# Patient Record
Sex: MALE | Race: WHITE | NOT HISPANIC OR LATINO | Employment: OTHER | URBAN - NONMETROPOLITAN AREA
[De-identification: names, ages, dates, MRNs, and addresses within clinical notes are randomized per-mention and may not be internally consistent; named-entity substitution may affect disease eponyms.]

---

## 2018-10-06 ENCOUNTER — HOSPITAL ENCOUNTER (INPATIENT)
Facility: HOSPITAL | Age: 58
LOS: 1 days | Discharge: HOME | DRG: 637 | End: 2018-10-07
Attending: PHYSICIAN ASSISTANT | Admitting: INTERNAL MEDICINE
Payer: MEDICARE

## 2018-10-06 ENCOUNTER — APPOINTMENT (OUTPATIENT)
Dept: GENERAL RADIOLOGY | Facility: HOSPITAL | Age: 58
DRG: 637 | End: 2018-10-06
Attending: PHYSICIAN ASSISTANT
Payer: MEDICARE

## 2018-10-06 DIAGNOSIS — E10.65 TYPE 1 DIABETES MELLITUS WITH HYPERGLYCEMIA (H): ICD-10-CM

## 2018-10-06 DIAGNOSIS — E86.0 DEHYDRATION: ICD-10-CM

## 2018-10-06 DIAGNOSIS — N17.9 ACUTE RENAL FAILURE, UNSPECIFIED ACUTE RENAL FAILURE TYPE (H): ICD-10-CM

## 2018-10-06 PROBLEM — R55 SYNCOPE, UNSPECIFIED SYNCOPE TYPE: Status: ACTIVE | Noted: 2018-10-06

## 2018-10-06 PROBLEM — M54.9 BACK PAIN: Status: ACTIVE | Noted: 2018-10-06

## 2018-10-06 PROBLEM — E11.00 UNCONTROLLED TYPE 2 DIABETES MELLITUS WITH HYPEROSMOLAR NONKETOTIC HYPERGLYCEMIA (H): Status: ACTIVE | Noted: 2018-10-06

## 2018-10-06 LAB
ALBUMIN SERPL-MCNC: 3.6 G/DL (ref 3.4–5)
ALBUMIN SERPL-MCNC: 3.6 G/DL (ref 3.4–5)
ALBUMIN SERPL-MCNC: 4 G/DL (ref 3.4–5)
ALBUMIN UR-MCNC: NEGATIVE MG/DL
ALP SERPL-CCNC: 102 U/L (ref 40–150)
ALP SERPL-CCNC: 103 U/L (ref 40–150)
ALP SERPL-CCNC: 118 U/L (ref 40–150)
ALT SERPL W P-5'-P-CCNC: 43 U/L (ref 0–70)
ALT SERPL W P-5'-P-CCNC: 45 U/L (ref 0–70)
ALT SERPL W P-5'-P-CCNC: 50 U/L (ref 0–70)
ANION GAP SERPL CALCULATED.3IONS-SCNC: 13 MMOL/L (ref 3–14)
ANION GAP SERPL CALCULATED.3IONS-SCNC: 6 MMOL/L (ref 3–14)
ANION GAP SERPL CALCULATED.3IONS-SCNC: 7 MMOL/L (ref 3–14)
APPEARANCE UR: CLEAR
AST SERPL W P-5'-P-CCNC: 17 U/L (ref 0–45)
AST SERPL W P-5'-P-CCNC: 17 U/L (ref 0–45)
AST SERPL W P-5'-P-CCNC: 23 U/L (ref 0–45)
BASE DEFICIT BLDV-SCNC: 1 MMOL/L
BASOPHILS # BLD AUTO: 0 10E9/L (ref 0–0.2)
BASOPHILS NFR BLD AUTO: 0.5 %
BILIRUB SERPL-MCNC: 0.5 MG/DL (ref 0.2–1.3)
BILIRUB SERPL-MCNC: 0.7 MG/DL (ref 0.2–1.3)
BILIRUB SERPL-MCNC: 0.9 MG/DL (ref 0.2–1.3)
BILIRUB UR QL STRIP: NEGATIVE
BUN SERPL-MCNC: 40 MG/DL (ref 7–30)
BUN SERPL-MCNC: 42 MG/DL (ref 7–30)
BUN SERPL-MCNC: 43 MG/DL (ref 7–30)
CALCIUM SERPL-MCNC: 8 MG/DL (ref 8.5–10.1)
CALCIUM SERPL-MCNC: 8.2 MG/DL (ref 8.5–10.1)
CALCIUM SERPL-MCNC: 8.9 MG/DL (ref 8.5–10.1)
CHLORIDE SERPL-SCNC: 105 MMOL/L (ref 94–109)
CHLORIDE SERPL-SCNC: 91 MMOL/L (ref 94–109)
CHLORIDE SERPL-SCNC: 98 MMOL/L (ref 94–109)
CK SERPL-CCNC: 172 U/L (ref 30–300)
CO2 SERPL-SCNC: 23 MMOL/L (ref 20–32)
CO2 SERPL-SCNC: 25 MMOL/L (ref 20–32)
CO2 SERPL-SCNC: 26 MMOL/L (ref 20–32)
COLOR UR AUTO: ABNORMAL
CREAT SERPL-MCNC: 1.65 MG/DL (ref 0.66–1.25)
CREAT SERPL-MCNC: 1.84 MG/DL (ref 0.66–1.25)
CREAT SERPL-MCNC: 1.97 MG/DL (ref 0.66–1.25)
CRP SERPL-MCNC: <2.9 MG/L (ref 0–8)
D DIMER PPP DDU-MCNC: 206 NG/ML D-DU (ref 0–300)
DIFFERENTIAL METHOD BLD: NORMAL
EOSINOPHIL # BLD AUTO: 0.1 10E9/L (ref 0–0.7)
EOSINOPHIL NFR BLD AUTO: 1.5 %
ERYTHROCYTE [DISTWIDTH] IN BLOOD BY AUTOMATED COUNT: 11.4 % (ref 10–15)
ERYTHROCYTE [SEDIMENTATION RATE] IN BLOOD BY WESTERGREN METHOD: 7 MM/H (ref 0–20)
EST. AVERAGE GLUCOSE BLD GHB EST-MCNC: 280 MG/DL
GFR SERPL CREATININE-BSD FRML MDRD: 35 ML/MIN/1.7M2
GFR SERPL CREATININE-BSD FRML MDRD: 38 ML/MIN/1.7M2
GFR SERPL CREATININE-BSD FRML MDRD: 43 ML/MIN/1.7M2
GLUCOSE SERPL-MCNC: 294 MG/DL (ref 70–99)
GLUCOSE SERPL-MCNC: 607 MG/DL (ref 70–99)
GLUCOSE SERPL-MCNC: 774 MG/DL (ref 70–99)
GLUCOSE UR STRIP-MCNC: >1000 MG/DL
HBA1C MFR BLD: 11.4 % (ref 0–5.6)
HCO3 BLDV-SCNC: 26 MMOL/L (ref 21–28)
HCT VFR BLD AUTO: 43.2 % (ref 40–53)
HGB BLD-MCNC: 15.1 G/DL (ref 13.3–17.7)
HGB UR QL STRIP: NEGATIVE
IMM GRANULOCYTES # BLD: 0 10E9/L (ref 0–0.4)
IMM GRANULOCYTES NFR BLD: 0.1 %
KETONES BLD-SCNC: 2.9 MMOL/L (ref 0–0.6)
KETONES UR STRIP-MCNC: 10 MG/DL
LACTATE SERPL-SCNC: 2.3 MMOL/L (ref 0.4–2)
LEUKOCYTE ESTERASE UR QL STRIP: NEGATIVE
LYMPHOCYTES # BLD AUTO: 0.8 10E9/L (ref 0.8–5.3)
LYMPHOCYTES NFR BLD AUTO: 11 %
MCH RBC QN AUTO: 32.3 PG (ref 26.5–33)
MCHC RBC AUTO-ENTMCNC: 35 G/DL (ref 31.5–36.5)
MCV RBC AUTO: 93 FL (ref 78–100)
MONOCYTES # BLD AUTO: 0.6 10E9/L (ref 0–1.3)
MONOCYTES NFR BLD AUTO: 7.9 %
NEUTROPHILS # BLD AUTO: 5.8 10E9/L (ref 1.6–8.3)
NEUTROPHILS NFR BLD AUTO: 79 %
NITRATE UR QL: NEGATIVE
NRBC # BLD AUTO: 0 10*3/UL
NRBC BLD AUTO-RTO: 0 /100
O2/TOTAL GAS SETTING VFR VENT: ABNORMAL %
OSMOLALITY SERPL: 319 MMOL/KG (ref 280–295)
OXYHGB MFR BLDV: 45 %
PCO2 BLDV: 53 MM HG (ref 40–50)
PH BLDV: 7.31 PH (ref 7.32–7.43)
PH UR STRIP: 5 PH (ref 4.7–8)
PLATELET # BLD AUTO: 199 10E9/L (ref 150–450)
PO2 BLDV: 29 MM HG (ref 25–47)
POTASSIUM SERPL-SCNC: 4.7 MMOL/L (ref 3.4–5.3)
POTASSIUM SERPL-SCNC: 5.7 MMOL/L (ref 3.4–5.3)
POTASSIUM SERPL-SCNC: 5.9 MMOL/L (ref 3.4–5.3)
PROCALCITONIN SERPL-MCNC: <0.05 NG/ML
PROT SERPL-MCNC: 6.4 G/DL (ref 6.8–8.8)
PROT SERPL-MCNC: 6.5 G/DL (ref 6.8–8.8)
PROT SERPL-MCNC: 7.2 G/DL (ref 6.8–8.8)
RBC # BLD AUTO: 4.67 10E12/L (ref 4.4–5.9)
SODIUM SERPL-SCNC: 127 MMOL/L (ref 133–144)
SODIUM SERPL-SCNC: 131 MMOL/L (ref 133–144)
SODIUM SERPL-SCNC: 136 MMOL/L (ref 133–144)
SOURCE: ABNORMAL
SP GR UR STRIP: 1.01 (ref 1–1.03)
TROPONIN I SERPL-MCNC: <0.015 UG/L (ref 0–0.04)
UROBILINOGEN UR STRIP-MCNC: NORMAL MG/DL (ref 0–2)
WBC # BLD AUTO: 7.4 10E9/L (ref 4–11)

## 2018-10-06 PROCEDURE — 86140 C-REACTIVE PROTEIN: CPT | Performed by: PHYSICIAN ASSISTANT

## 2018-10-06 PROCEDURE — 36415 COLL VENOUS BLD VENIPUNCTURE: CPT | Performed by: PHYSICIAN ASSISTANT

## 2018-10-06 PROCEDURE — 84484 ASSAY OF TROPONIN QUANT: CPT | Performed by: PHYSICIAN ASSISTANT

## 2018-10-06 PROCEDURE — 99285 EMERGENCY DEPT VISIT HI MDM: CPT | Mod: 25

## 2018-10-06 PROCEDURE — 84145 PROCALCITONIN (PCT): CPT | Performed by: PHYSICIAN ASSISTANT

## 2018-10-06 PROCEDURE — 82010 KETONE BODYS QUAN: CPT | Performed by: PHYSICIAN ASSISTANT

## 2018-10-06 PROCEDURE — 81003 URINALYSIS AUTO W/O SCOPE: CPT | Performed by: PHYSICIAN ASSISTANT

## 2018-10-06 PROCEDURE — 85652 RBC SED RATE AUTOMATED: CPT | Performed by: INTERNAL MEDICINE

## 2018-10-06 PROCEDURE — 25000128 H RX IP 250 OP 636: Performed by: INTERNAL MEDICINE

## 2018-10-06 PROCEDURE — 83930 ASSAY OF BLOOD OSMOLALITY: CPT | Performed by: PHYSICIAN ASSISTANT

## 2018-10-06 PROCEDURE — 25000131 ZZH RX MED GY IP 250 OP 636 PS 637: Mod: GY | Performed by: PHYSICIAN ASSISTANT

## 2018-10-06 PROCEDURE — 85025 COMPLETE CBC W/AUTO DIFF WBC: CPT | Performed by: PHYSICIAN ASSISTANT

## 2018-10-06 PROCEDURE — 83036 HEMOGLOBIN GLYCOSYLATED A1C: CPT | Performed by: PHYSICIAN ASSISTANT

## 2018-10-06 PROCEDURE — 12000000 ZZH R&B MED SURG/OB

## 2018-10-06 PROCEDURE — 40000788 ZZHCL STATISTIC ESTIMATED AVERAGE GLUCOSE: Performed by: PHYSICIAN ASSISTANT

## 2018-10-06 PROCEDURE — 71045 X-RAY EXAM CHEST 1 VIEW: CPT | Mod: TC

## 2018-10-06 PROCEDURE — 85379 FIBRIN DEGRADATION QUANT: CPT | Performed by: PHYSICIAN ASSISTANT

## 2018-10-06 PROCEDURE — 96376 TX/PRO/DX INJ SAME DRUG ADON: CPT

## 2018-10-06 PROCEDURE — 96374 THER/PROPH/DIAG INJ IV PUSH: CPT

## 2018-10-06 PROCEDURE — 82805 BLOOD GASES W/O2 SATURATION: CPT | Performed by: PHYSICIAN ASSISTANT

## 2018-10-06 PROCEDURE — 25000128 H RX IP 250 OP 636: Performed by: PHYSICIAN ASSISTANT

## 2018-10-06 PROCEDURE — 99223 1ST HOSP IP/OBS HIGH 75: CPT | Mod: AI | Performed by: INTERNAL MEDICINE

## 2018-10-06 PROCEDURE — 00000146 ZZHCL STATISTIC GLUCOSE BY METER IP

## 2018-10-06 PROCEDURE — 87040 BLOOD CULTURE FOR BACTERIA: CPT | Performed by: PHYSICIAN ASSISTANT

## 2018-10-06 PROCEDURE — 80053 COMPREHEN METABOLIC PANEL: CPT | Performed by: PHYSICIAN ASSISTANT

## 2018-10-06 PROCEDURE — 83605 ASSAY OF LACTIC ACID: CPT | Performed by: PHYSICIAN ASSISTANT

## 2018-10-06 PROCEDURE — 96361 HYDRATE IV INFUSION ADD-ON: CPT

## 2018-10-06 PROCEDURE — 25000132 ZZH RX MED GY IP 250 OP 250 PS 637: Mod: GY | Performed by: PHYSICIAN ASSISTANT

## 2018-10-06 PROCEDURE — 93005 ELECTROCARDIOGRAM TRACING: CPT

## 2018-10-06 PROCEDURE — 99285 EMERGENCY DEPT VISIT HI MDM: CPT | Mod: Z6 | Performed by: PHYSICIAN ASSISTANT

## 2018-10-06 PROCEDURE — 93010 ELECTROCARDIOGRAM REPORT: CPT | Performed by: INTERNAL MEDICINE

## 2018-10-06 PROCEDURE — 82550 ASSAY OF CK (CPK): CPT | Performed by: INTERNAL MEDICINE

## 2018-10-06 RX ORDER — ONDANSETRON 4 MG/1
4 TABLET, ORALLY DISINTEGRATING ORAL EVERY 6 HOURS PRN
Status: DISCONTINUED | OUTPATIENT
Start: 2018-10-06 | End: 2018-10-07 | Stop reason: HOSPADM

## 2018-10-06 RX ORDER — MAGNESIUM SULFATE HEPTAHYDRATE 40 MG/ML
4 INJECTION, SOLUTION INTRAVENOUS EVERY 4 HOURS PRN
Status: DISCONTINUED | OUTPATIENT
Start: 2018-10-06 | End: 2018-10-07 | Stop reason: HOSPADM

## 2018-10-06 RX ORDER — NALOXONE HYDROCHLORIDE 0.4 MG/ML
.1-.4 INJECTION, SOLUTION INTRAMUSCULAR; INTRAVENOUS; SUBCUTANEOUS
Status: DISCONTINUED | OUTPATIENT
Start: 2018-10-06 | End: 2018-10-07 | Stop reason: HOSPADM

## 2018-10-06 RX ORDER — CHOLECALCIFEROL (VITAMIN D3) 25 MCG
2 CAPSULE ORAL DAILY
Status: ON HOLD | COMMUNITY
End: 2018-11-23

## 2018-10-06 RX ORDER — AMPICILLIN TRIHYDRATE 250 MG
1 CAPSULE ORAL DAILY
Status: ON HOLD | COMMUNITY
End: 2018-11-23

## 2018-10-06 RX ORDER — POTASSIUM CHLORIDE 7.45 MG/ML
10 INJECTION INTRAVENOUS
Status: DISCONTINUED | OUTPATIENT
Start: 2018-10-06 | End: 2018-10-07 | Stop reason: HOSPADM

## 2018-10-06 RX ORDER — SODIUM CHLORIDE 9 MG/ML
INJECTION, SOLUTION INTRAVENOUS CONTINUOUS
Status: DISCONTINUED | OUTPATIENT
Start: 2018-10-06 | End: 2018-10-07

## 2018-10-06 RX ORDER — NICOTINE POLACRILEX 4 MG
15-30 LOZENGE BUCCAL
Status: DISCONTINUED | OUTPATIENT
Start: 2018-10-06 | End: 2018-10-07 | Stop reason: HOSPADM

## 2018-10-06 RX ORDER — POTASSIUM CL/LIDO/0.9 % NACL 10MEQ/0.1L
10 INTRAVENOUS SOLUTION, PIGGYBACK (ML) INTRAVENOUS
Status: DISCONTINUED | OUTPATIENT
Start: 2018-10-06 | End: 2018-10-07 | Stop reason: HOSPADM

## 2018-10-06 RX ORDER — MULTIPLE VITAMINS W/ MINERALS TAB 9MG-400MCG
1 TAB ORAL DAILY
Status: ON HOLD | COMMUNITY
End: 2021-09-16

## 2018-10-06 RX ORDER — METOPROLOL TARTRATE 50 MG
50 TABLET ORAL 2 TIMES DAILY
Status: DISCONTINUED | OUTPATIENT
Start: 2018-10-06 | End: 2018-10-07 | Stop reason: HOSPADM

## 2018-10-06 RX ORDER — POTASSIUM CHLORIDE 1.5 G/1.58G
20-40 POWDER, FOR SOLUTION ORAL
Status: DISCONTINUED | OUTPATIENT
Start: 2018-10-06 | End: 2018-10-07 | Stop reason: HOSPADM

## 2018-10-06 RX ORDER — METOPROLOL TARTRATE 50 MG
50 TABLET ORAL ONCE
Status: COMPLETED | OUTPATIENT
Start: 2018-10-06 | End: 2018-10-06

## 2018-10-06 RX ORDER — POTASSIUM CHLORIDE 1500 MG/1
20-40 TABLET, EXTENDED RELEASE ORAL
Status: DISCONTINUED | OUTPATIENT
Start: 2018-10-06 | End: 2018-10-07 | Stop reason: HOSPADM

## 2018-10-06 RX ORDER — ASPIRIN 81 MG/1
81 TABLET, CHEWABLE ORAL DAILY
Status: DISCONTINUED | OUTPATIENT
Start: 2018-10-07 | End: 2018-10-07 | Stop reason: HOSPADM

## 2018-10-06 RX ORDER — ACETAMINOPHEN 325 MG/1
650 TABLET ORAL EVERY 4 HOURS PRN
Status: DISCONTINUED | OUTPATIENT
Start: 2018-10-06 | End: 2018-10-07 | Stop reason: HOSPADM

## 2018-10-06 RX ORDER — DEXTROSE MONOHYDRATE 25 G/50ML
25-50 INJECTION, SOLUTION INTRAVENOUS
Status: DISCONTINUED | OUTPATIENT
Start: 2018-10-06 | End: 2018-10-07 | Stop reason: HOSPADM

## 2018-10-06 RX ORDER — ONDANSETRON 2 MG/ML
4 INJECTION INTRAMUSCULAR; INTRAVENOUS EVERY 6 HOURS PRN
Status: DISCONTINUED | OUTPATIENT
Start: 2018-10-06 | End: 2018-10-07 | Stop reason: HOSPADM

## 2018-10-06 RX ORDER — LABETALOL HYDROCHLORIDE 5 MG/ML
20 INJECTION, SOLUTION INTRAVENOUS EVERY 6 HOURS PRN
Status: DISCONTINUED | OUTPATIENT
Start: 2018-10-06 | End: 2018-10-07 | Stop reason: HOSPADM

## 2018-10-06 RX ORDER — LEVOTHYROXINE SODIUM 50 UG/1
50 TABLET ORAL DAILY
Status: DISCONTINUED | OUTPATIENT
Start: 2018-10-07 | End: 2018-10-07 | Stop reason: HOSPADM

## 2018-10-06 RX ADMIN — SODIUM CHLORIDE 1000 ML: 9 INJECTION, SOLUTION INTRAVENOUS at 17:13

## 2018-10-06 RX ADMIN — INSULIN HUMAN 5 UNITS: 100 INJECTION, SOLUTION PARENTERAL at 20:28

## 2018-10-06 RX ADMIN — SODIUM CHLORIDE 1000 ML: 9 INJECTION, SOLUTION INTRAVENOUS at 17:57

## 2018-10-06 RX ADMIN — SODIUM CHLORIDE 1000 ML: 9 INJECTION, SOLUTION INTRAVENOUS at 20:07

## 2018-10-06 RX ADMIN — SODIUM CHLORIDE: 9 INJECTION, SOLUTION INTRAVENOUS at 23:18

## 2018-10-06 RX ADMIN — SODIUM CHLORIDE 1000 ML: 9 INJECTION, SOLUTION INTRAVENOUS at 23:18

## 2018-10-06 RX ADMIN — INSULIN HUMAN 5 UNITS: 100 INJECTION, SOLUTION PARENTERAL at 18:02

## 2018-10-06 RX ADMIN — METOPROLOL TARTRATE 50 MG: 50 TABLET, FILM COATED ORAL at 21:00

## 2018-10-06 NOTE — ED PROVIDER NOTES
History     Chief Complaint   Patient presents with     Dizziness     HPI  Homer Triana is a 58 year old male with type I diabetes and insulin pump who presents with fatigue, generalized weakness, and dizziness x 1 month. He has not been checking his blood sugars for quite some time as he had been pretty well controlled. His pump does not read his blood sugar levels. He had a syncopal episode a few weeks ago as well which occurred when he stood up from sitting. He has also been having pain between his shoulder blades and posterior neck with this as well. No aggravating or alleviating factors. Has a chronic cough. Denies dyspnea or chest pain. Denies dysuria or abdominal pain. No fevers/chills. Has a daily diarrhea stool since having his gallbladder removed 3 years ago, no changes. Denies black/bloody stools.     Problem List:    Patient Active Problem List    Diagnosis Date Noted     Uncontrolled type 2 DM with hyperosmolar nonketotic hyperglycemia (H) 10/06/2018     Priority: Medium     Syncope, unspecified syncope type 10/06/2018     Priority: Medium     Diabetic neuropathy (H) 10/06/2018     Priority: Medium     Back pain 10/06/2018     Priority: Medium     Generalized muscle weakness 10/06/2018     Priority: Medium     Essential hypertension 10/06/2018     Priority: Medium     Acute kidney injury (H) 10/06/2018     Priority: Medium        Past Medical History:    Past Medical History:   Diagnosis Date     Diabetes (H)      High cholesterol      Hypertension      Thyroid disease        Past Surgical History:    History reviewed. No pertinent surgical history.    Family History:    No family history on file.    Social History:  Marital Status:    Social History   Substance Use Topics     Smoking status: Former Smoker     Smokeless tobacco: Never Used     Alcohol use Yes      Comment: moderately        Medications:      Ascorbic Acid (VITAMIN C PO)   ASPIRIN PO   Cholecalciferol (VITAMIN D-3) 1000 units CAPS    cinnamon 500 MG CAPS   Flax Oil-Fish Oil-Borage Oil (FISH OIL-FLAX OIL-BORAGE OIL PO)   HYDROCHLOROTHIAZIDE PO   Insulin Aspart (NOVOLOG SC)   LEVOTHYROXINE SODIUM PO   LISINOPRIL PO   METOPROLOL TARTRATE PO   multivitamin, therapeutic with minerals (MULTI-VITAMIN) TABS tablet   ROSUVASTATIN CALCIUM PO         Review of Systems   All other systems reviewed and are negative.      Physical Exam   BP: (!) 187/95  Pulse: 105  Heart Rate: 98  Temp: 96  F (35.6  C)  Resp: 16  SpO2: 100 %      Physical Exam   Constitutional: He is oriented to person, place, and time. He appears well-developed and well-nourished. He appears ill. No distress.   Shaky when standing.    HENT:   Head: Normocephalic and atraumatic.   Right Ear: External ear normal.   Left Ear: External ear normal.   Nose: Nose normal.   Mouth/Throat: Oropharynx is clear and moist. No oropharyngeal exudate.   Eyes: Conjunctivae and EOM are normal. Pupils are equal, round, and reactive to light. Right eye exhibits no discharge. Left eye exhibits no discharge. No scleral icterus.   Neck: Normal range of motion. Neck supple. No JVD present.   Cardiovascular: Normal rate, regular rhythm, normal heart sounds and intact distal pulses.  Exam reveals no gallop and no friction rub.    No murmur heard.  Pulmonary/Chest: Effort normal and breath sounds normal. No respiratory distress. He has no wheezes. He has no rales. He exhibits no tenderness.   Abdominal: There is no tenderness.   Musculoskeletal: Normal range of motion. He exhibits no edema.   Lymphadenopathy:     He has no cervical adenopathy.   Neurological: He is alert and oriented to person, place, and time. No cranial nerve deficit. Coordination normal.   Skin: Skin is warm and dry. No rash noted. He is not diaphoretic. No erythema. No pallor.   Psychiatric: He has a normal mood and affect. His behavior is normal. Judgment and thought content normal.   Nursing note and vitals reviewed.      ED Course     ED  Course     Procedures          EKG: NSR without acute ST-T changes.     Results for orders placed or performed during the hospital encounter of 10/06/18 (from the past 24 hour(s))   CBC with platelets differential   Result Value Ref Range    WBC 7.4 4.0 - 11.0 10e9/L    RBC Count 4.67 4.4 - 5.9 10e12/L    Hemoglobin 15.1 13.3 - 17.7 g/dL    Hematocrit 43.2 40.0 - 53.0 %    MCV 93 78 - 100 fl    MCH 32.3 26.5 - 33.0 pg    MCHC 35.0 31.5 - 36.5 g/dL    RDW 11.4 10.0 - 15.0 %    Platelet Count 199 150 - 450 10e9/L    Diff Method Automated Method     % Neutrophils 79.0 %    % Lymphocytes 11.0 %    % Monocytes 7.9 %    % Eosinophils 1.5 %    % Basophils 0.5 %    % Immature Granulocytes 0.1 %    Nucleated RBCs 0 0 /100    Absolute Neutrophil 5.8 1.6 - 8.3 10e9/L    Absolute Lymphocytes 0.8 0.8 - 5.3 10e9/L    Absolute Monocytes 0.6 0.0 - 1.3 10e9/L    Absolute Eosinophils 0.1 0.0 - 0.7 10e9/L    Absolute Basophils 0.0 0.0 - 0.2 10e9/L    Abs Immature Granulocytes 0.0 0 - 0.4 10e9/L    Absolute Nucleated RBC 0.0    Comprehensive metabolic panel   Result Value Ref Range    Sodium 127 (L) 133 - 144 mmol/L    Potassium 5.9 (H) 3.4 - 5.3 mmol/L    Chloride 91 (L) 94 - 109 mmol/L    Carbon Dioxide 23 20 - 32 mmol/L    Anion Gap 13 3 - 14 mmol/L    Glucose 774 (HH) 70 - 99 mg/dL    Urea Nitrogen 43 (H) 7 - 30 mg/dL    Creatinine 1.97 (H) 0.66 - 1.25 mg/dL    GFR Estimate 35 (L) >60 mL/min/1.7m2    GFR Estimate If Black 43 (L) >60 mL/min/1.7m2    Calcium 8.9 8.5 - 10.1 mg/dL    Bilirubin Total 0.9 0.2 - 1.3 mg/dL    Albumin 4.0 3.4 - 5.0 g/dL    Protein Total 7.2 6.8 - 8.8 g/dL    Alkaline Phosphatase 118 40 - 150 U/L    ALT 50 0 - 70 U/L    AST 23 0 - 45 U/L   D-Dimer ([facility])   Result Value Ref Range    D-Dimer ng/mL 206 0 - 300 ng/ml D-DU   Lactic acid   Result Value Ref Range    Lactic Acid 2.3 (H) 0.4 - 2.0 mmol/L   Procalcitonin   Result Value Ref Range    Procalcitonin <0.05 ng/ml   Troponin I   Result Value Ref Range     Troponin I ES <0.015 0.000 - 0.045 ug/L   Hemoglobin A1c   Result Value Ref Range    Hemoglobin A1C 11.4 (H) 0 - 5.6 %   Estimated Average Glucose   Result Value Ref Range    Estimated Average Glucose 280 mg/dL   CRP inflammation   Result Value Ref Range    CRP Inflammation <2.9 0.0 - 8.0 mg/L   Ketone Beta-Hydroxybutyrate Quantitative   Result Value Ref Range    Ketone Quantitative 2.9 (H) 0.0 - 0.6 mmol/L   UA reflex to Microscopic and Culture   Result Value Ref Range    Color Urine Light Yellow     Appearance Urine Clear     Glucose Urine >1000 (A) NEG^Negative mg/dL    Bilirubin Urine Negative NEG^Negative    Ketones Urine 10 (A) NEG^Negative mg/dL    Specific Gravity Urine 1.014 1.003 - 1.035    Blood Urine Negative NEG^Negative    pH Urine 5.0 4.7 - 8.0 pH    Protein Albumin Urine Negative NEG^Negative mg/dL    Urobilinogen mg/dL Normal 0.0 - 2.0 mg/dL    Nitrite Urine Negative NEG^Negative    Leukocyte Esterase Urine Negative NEG^Negative    Source Midstream Urine    Comprehensive metabolic panel   Result Value Ref Range    Sodium 131 (L) 133 - 144 mmol/L    Potassium 5.7 (H) 3.4 - 5.3 mmol/L    Chloride 98 94 - 109 mmol/L    Carbon Dioxide 26 20 - 32 mmol/L    Anion Gap 7 3 - 14 mmol/L    Glucose 607 (HH) 70 - 99 mg/dL    Urea Nitrogen 42 (H) 7 - 30 mg/dL    Creatinine 1.84 (H) 0.66 - 1.25 mg/dL    GFR Estimate 38 (L) >60 mL/min/1.7m2    GFR Estimate If Black 46 (L) >60 mL/min/1.7m2    Calcium 8.0 (L) 8.5 - 10.1 mg/dL    Bilirubin Total 0.7 0.2 - 1.3 mg/dL    Albumin 3.6 3.4 - 5.0 g/dL    Protein Total 6.5 (L) 6.8 - 8.8 g/dL    Alkaline Phosphatase 102 40 - 150 U/L    ALT 45 0 - 70 U/L    AST 17 0 - 45 U/L   Osmolality   Result Value Ref Range    Osmolality 319 (H) 280 - 295 mmol/kg   Blood gas venous and oxyhgb   Result Value Ref Range    Ph Venous 7.31 (L) 7.32 - 7.43 pH    PCO2 Venous 53 (H) 40 - 50 mm Hg    PO2 Venous 29 25 - 47 mm Hg    Bicarbonate Venous 26 21 - 28 mmol/L    FIO2 21%     Oxyhemoglobin  Venous 45 %    Base Deficit Venous 1.0 mmol/L   Comprehensive metabolic panel   Result Value Ref Range    Sodium 136 133 - 144 mmol/L    Potassium 4.7 3.4 - 5.3 mmol/L    Chloride 105 94 - 109 mmol/L    Carbon Dioxide 25 20 - 32 mmol/L    Anion Gap 6 3 - 14 mmol/L    Glucose 294 (H) 70 - 99 mg/dL    Urea Nitrogen 40 (H) 7 - 30 mg/dL    Creatinine 1.65 (H) 0.66 - 1.25 mg/dL    GFR Estimate 43 (L) >60 mL/min/1.7m2    GFR Estimate If Black 52 (L) >60 mL/min/1.7m2    Calcium 8.2 (L) 8.5 - 10.1 mg/dL    Bilirubin Total 0.5 0.2 - 1.3 mg/dL    Albumin 3.6 3.4 - 5.0 g/dL    Protein Total 6.4 (L) 6.8 - 8.8 g/dL    Alkaline Phosphatase 103 40 - 150 U/L    ALT 43 0 - 70 U/L    AST 17 0 - 45 U/L       Medications   0.9% sodium chloride BOLUS (0 mLs Intravenous Stopped 10/6/18 1819)   0.9% sodium chloride BOLUS (0 mLs Intravenous Stopped 10/6/18 2011)   insulin (regular) (HumuLIN R/NovoLIN R) injection 5 Units (5 Units Intravenous Given 10/6/18 1802)   0.9% sodium chloride BOLUS (0 mLs Intravenous Stopped 10/6/18 2126)   insulin (regular) (HumuLIN R/NovoLIN R) injection 5 Units (5 Units Intravenous Given 10/6/18 2028)   metoprolol tartrate (LOPRESSOR) tablet 50 mg (50 mg Oral Given 10/6/18 2100)       Assessments & Plan (with Medical Decision Making)   Homer is very shaky and generally weak upon arrival c/o upper back/neck pain. He was found to be hyperglycemic at 774. No signs of DKA. Sodium is low at 127, potassium high at 5.9, creatinine elevated at 1.97 and BUN elevated at 43. He was given a 2 liter bolus of NS, 10 units subcutaneous of regular insulin from his pump and 5 units IV regular insulin. 1 hour following, his blood sugar is down to 607. Creatine slightly improved to 1.84.   Discussed admission with Dr. Ken to our med/surg floor and he wants me to repeat another liter bolus and another 5 units if IV regular insulin and re-check metabolic panel following. He wants to be sure his labs are improving enough to not  require an insulin drip.   This was done and repeat metabolic panel shows blood sugar now 297. Creatinine improved further to 1.65. Potassium normalized to 4.7. Dr. Ken has now kindly accepted him to the med/surg floor for further care.     I have reviewed the nursing notes.    I have reviewed the findings, diagnosis, plan and need for follow up with the patient.    New Prescriptions    No medications on file       Final diagnoses:   Acute renal failure, unspecified acute renal failure type (H)   Type 1 diabetes mellitus with hyperglycemia (H)   Dehydration       10/6/2018   [facility]     Lety Ruvalcaba PA-C  10/06/18 8659

## 2018-10-06 NOTE — ED NOTES
Ambulated to room 2, gown placed, call light in reach.  Dizzy, SOB, and weak for the past 2 weeks.  Worse today. Denies pain, nausea, vomiting.  Neuropathy from knees down and in both hands.

## 2018-10-06 NOTE — ED NOTES
Critical result of  Glucose value of 774  Significant result Lactic Acid value of 2.3  Reported to  Anuradha Wiseman RN and Jovita BUCKNER  Time given to provider  1742  Aisha Jain RN 5:41 PM 10/6/2018

## 2018-10-06 NOTE — IP AVS SNAPSHOT
[facility]    [address] [place] [id number]    Phone:  [phone]    Fax:  [phone]                                       After Visit Summary   10/6/2018    Homer Triana    MRN: [id number]           After Visit Summary Signature Page     I have received my discharge instructions, and my questions have been answered. I have discussed any challenges I see with this plan with the nurse or doctor.    ..........................................................................................................................................  Patient/Patient Representative Signature      ..........................................................................................................................................  Patient Representative Print Name and Relationship to Patient    ..................................................               ................................................  Date                                   Time    ..........................................................................................................................................  Reviewed by Signature/Title    ...................................................              ..............................................  Date                                               Time          22EPIC Rev 08/18

## 2018-10-06 NOTE — IP AVS SNAPSHOT
MRN:[id number]                      After Visit Summary   10/6/2018    Homer Triana    MRN: [id number]           Thank you!     Thank you for choosing [facility] for your care. Our goal is always to provide you with excellent care. Hearing back from our patients is one way we can continue to improve our services. Please take a few minutes to complete the written survey that you may receive in the mail after you visit with us. Thank you!        Patient Information     Date Of Birth          1960        Designated Caregiver       Most Recent Value    Caregiver    Will someone help with your care after discharge? no      About your hospital stay     You were admitted on:  October 6, 2018 You last received care in the:  [facility]    You were discharged on:  October 7, 2018       Who to Call     For medical emergencies, please call 911.  For non-urgent questions about your medical care, please call your primary care provider or clinic, [phone]          Attending Provider     Provider Specialty    Lety Ruvalcaba PA-C Emergency Medicine    Kamila Ken MD Internal Medicine    Maycol Dior MD Internal Medicine       Primary Care Provider Office Phone # Fax #    Russ Stockton [phone]      After Care Instructions     Activity       Your activity upon discharge: activity as tolerated            Diet       Follow this diet upon discharge: Orders Placed This Encounter      Combination Diet Regular Diet Adult; 9617-0481 Calories: Moderate Consistent CHO (4-6 CHO units/meal)            Discharge Instructions                 Follow-up Appointments     Follow-up and recommended labs and tests        Follow up with primary care provider, Russ Stockton, within 7 days for hospital follow- up.  No follow up labs or test are needed.                  Pending Results     Date and Time Order Name Status Description    10/6/2018 1710 Estimated Average  "Glucose In process     10/6/2018 1657 Blood culture Preliminary     10/6/2018 1657 Blood culture Preliminary             Statement of Approval     Ordered          10/07/18 1136  I have reviewed and agree with all the recommendations and orders detailed in this document.  EFFECTIVE NOW     Approved and electronically signed by:  Maycol Dior MD           10/07/18 1114  I have reviewed and agree with all the recommendations and orders detailed in this document.  EFFECTIVE NOW     Approved and electronically signed by:  Maycol Dior MD             Admission Information     Date & Time Provider Department Dept. Phone    10/6/2018 Maycol Dior MD [facility] [phone]      Your Vitals Were     Blood Pressure Pulse Temperature Respirations Height Weight    150/84 80 98.4  F (36.9  C) (Tympanic) 18 1.778 m (5' 10\") 98.7 kg (217 lb 9.5 oz)    Pulse Oximetry BMI (Body Mass Index)                96% 31.22 kg/m2          MyChart Information     [employer] lets you send messages to your doctor, view your test results, renew your prescriptions, schedule appointments and more. To sign up, go to [email or web address] . Click on \"Log in\" on the left side of the screen, which will take you to the Welcome page. Then click on \"Sign up Now\" on the right side of the page.     You will be asked to enter the access code listed below, as well as some personal information. Please follow the directions to create your username and password.     Your access code is: [email or web address]  Expires: 2019 11:24 AM     Your access code will  in 90 days. If you need help or a new code, please call your [facility] clinic or [phone].        Care EveryWhere ID     This is your Care EveryWhere ID. This could be used by other organizations to access your [facility] medical records  [id number]        Equal Access to Services     EZEQUIEL CHAIDEZ AH: Michele Landaverde, blanca zelaya, qajosseta katelin renteria " mela tellovianeymarkell desmond burnham'aan ah. So [facility] [phone].    ATENCIÓN: Si juan daniella sommer, tiene a berry disposición servicios gratuitos de asistencia lingüística. Rian al [phone].    We comply with applicable federal civil rights laws and [place] laws. We do not discriminate on the basis of race, color, national origin, age, disability, sex, sexual orientation, or gender identity.               Review of your medicines      CONTINUE these medicines which have NOT CHANGED        Dose / Directions    ASPIRIN PO        Dose:  81 mg   Take 81 mg by mouth daily   Refills:  0       cinnamon 500 MG Caps        Dose:  1 capsule   Take 1 capsule by mouth daily   Refills:  0       FISH OIL-FLAX OIL-BORAGE OIL PO        Dose:  1 capsule   Take 1 capsule by mouth daily   Refills:  0       HYDROCHLOROTHIAZIDE PO        Dose:  25 mg   Take 25 mg by mouth daily   Refills:  0       LEVOTHYROXINE SODIUM PO        Dose:  50 mcg   Take 50 mcg by mouth daily   Refills:  0       LISINOPRIL PO        Dose:  40 mg   Take 40 mg by mouth daily   Refills:  0       METOPROLOL TARTRATE PO        Dose:  50 mg   Take 50 mg by mouth 2 times daily   Refills:  0       Multi-vitamin Tabs tablet        Dose:  1 tablet   Take 1 tablet by mouth daily   Refills:  0       NOVOLOG SC        Insulin pump   Refills:  0       ROSUVASTATIN CALCIUM PO        Dose:  10 mg   Take 10 mg by mouth daily   Refills:  0       VITAMIN C PO        Dose:  500 mg   Take 500 mg by mouth daily   Refills:  0       Vitamin D-3 1000 units Caps        Dose:  2 capsule   Take 2 capsules by mouth daily   Refills:  0                Protect others around you: Learn how to safely use, store and throw away your medicines at www.disposemymeds.org.             Medication List: This is a list of all your medications and when to take them. Check marks below indicate your daily home schedule. Keep this list as a reference.      Medications           Morning Afternoon Evening Bedtime  As Needed    ASPIRIN PO   Take 81 mg by mouth daily   Last time this was given:  81 mg on 10/7/2018  9:34 AM                                cinnamon 500 MG Caps   Take 1 capsule by mouth daily                                FISH OIL-FLAX OIL-BORAGE OIL PO   Take 1 capsule by mouth daily                                HYDROCHLOROTHIAZIDE PO   Take 25 mg by mouth daily                                LEVOTHYROXINE SODIUM PO   Take 50 mcg by mouth daily   Last time this was given:  50 mcg on 10/7/2018  6:00 AM                                LISINOPRIL PO   Take 40 mg by mouth daily                                METOPROLOL TARTRATE PO   Take 50 mg by mouth 2 times daily   Last time this was given:  50 mg on 10/7/2018  9:34 AM                                Multi-vitamin Tabs tablet   Take 1 tablet by mouth daily                                NOVOLOG SC   Insulin pump                                ROSUVASTATIN CALCIUM PO   Take 10 mg by mouth daily                                VITAMIN C PO   Take 500 mg by mouth daily                                Vitamin D-3 1000 units Caps   Take 2 capsules by mouth daily                                          More Information        High Blood Sugar (Hyperglycemia)     When you have hyperglycemia, drink plenty of water or other sugar-free, caffeine-free liquids.   Too much glucose (sugar) in your blood is called hyperglycemia or high blood sugar. High blood sugar can lead to a dangerous condition called ketoacidosis. In severe cases, it can lead to dehydration and coma.  Possible causes of hyperglycemia    Inadequate treatment plan for diabetes     Being sick    Being under stress    Taking certain medicines, such as steroids    Eating too much food, especially carbohydrates    Being less active than usual    Not taking enough diabetes medicine  Symptoms of hyperglycemia  Hyperglycemia may not cause symptoms. If you do have symptoms, they may include:    Thirst    Frequent need  to urinate    Feeling tired    Nausea and vomiting    Itchy, dry skin    Blurry vision    Fast breathing and breath that smells fruity     Weakness    Dizziness    Wounds or skin infections that don t heal    Unexplained weight loss if hyperglycemia lasts for more than a few days   What you should do  Make sure you do the following:    Check your blood sugar.    Drink plenty of sugar-free, caffeine-free liquids such as water. Don t drink fruit juice.    Check your blood sugar again every 4 hours. If you take insulin or diabetes medicines, follow your sick-day plan for taking medicine. Call your healthcare provider if you are not able to eat.    Check your blood or urine for ketones as directed.    Call your healthcare provider if your blood sugar and ketones do not return to your target range.  Preventing high blood sugar  To help keep your blood sugar from getting too high:    Control stress.    When you're ill, follow your sick-day plan.     Follow your meal plan. Eat only the amount of food on your meal plan    Follow your exercise plan.    Take your insulin or diabetes medicines as directed by your healthcare team. Also test your blood sugar as directed. If the plan is not working for you, discuss it with your healthcare provider.  Other things to do    Carry a medical ID card, a compact USB drive, or wear a medical alert bracelet or necklace. It should say that you have diabetes. It should also say what to do in case you pass out or go into a coma.    Make sure family, friends, and coworkers know the signs of high blood sugar. Tell them what to do if your blood sugar gets very high and you can t help yourself.    Talk to your healthcare team about other things you can do to prevent high blood sugar.   Special note: Drink plenty of sugar-free and caffeine-free liquids when you feel symptoms of hyperglycemia. Call your healthcare provider if you keep having episodes of hyperglycemia.   Date Last Reviewed:  5/1/2016 2000-2017 [employer]. [address] [place] [address], [place] [address]. All rights reserved. This information is not intended as a substitute for professional medical care. Always follow your healthcare professional's instructions.

## 2018-10-07 VITALS
DIASTOLIC BLOOD PRESSURE: 84 MMHG | BODY MASS INDEX: 31.15 KG/M2 | WEIGHT: 217.59 LBS | HEIGHT: 70 IN | SYSTOLIC BLOOD PRESSURE: 150 MMHG | OXYGEN SATURATION: 96 % | HEART RATE: 80 BPM | TEMPERATURE: 98.4 F | RESPIRATION RATE: 18 BRPM

## 2018-10-07 LAB
ALBUMIN SERPL-MCNC: 2.9 G/DL (ref 3.4–5)
ALP SERPL-CCNC: 79 U/L (ref 40–150)
ALT SERPL W P-5'-P-CCNC: 33 U/L (ref 0–70)
ANION GAP SERPL CALCULATED.3IONS-SCNC: 5 MMOL/L (ref 3–14)
ANION GAP SERPL CALCULATED.3IONS-SCNC: 7 MMOL/L (ref 3–14)
AST SERPL W P-5'-P-CCNC: 15 U/L (ref 0–45)
BILIRUB SERPL-MCNC: 0.4 MG/DL (ref 0.2–1.3)
BUN SERPL-MCNC: 36 MG/DL (ref 7–30)
BUN SERPL-MCNC: 37 MG/DL (ref 7–30)
CALCIUM SERPL-MCNC: 7.6 MG/DL (ref 8.5–10.1)
CALCIUM SERPL-MCNC: 7.8 MG/DL (ref 8.5–10.1)
CHLORIDE SERPL-SCNC: 107 MMOL/L (ref 94–109)
CHLORIDE SERPL-SCNC: 112 MMOL/L (ref 94–109)
CO2 SERPL-SCNC: 24 MMOL/L (ref 20–32)
CO2 SERPL-SCNC: 24 MMOL/L (ref 20–32)
CREAT SERPL-MCNC: 1.46 MG/DL (ref 0.66–1.25)
CREAT SERPL-MCNC: 1.57 MG/DL (ref 0.66–1.25)
GFR SERPL CREATININE-BSD FRML MDRD: 46 ML/MIN/1.7M2
GFR SERPL CREATININE-BSD FRML MDRD: 50 ML/MIN/1.7M2
GLUCOSE BLDC GLUCOMTR-MCNC: 103 MG/DL (ref 70–99)
GLUCOSE BLDC GLUCOMTR-MCNC: 117 MG/DL (ref 70–99)
GLUCOSE BLDC GLUCOMTR-MCNC: 134 MG/DL (ref 70–99)
GLUCOSE BLDC GLUCOMTR-MCNC: 194 MG/DL (ref 70–99)
GLUCOSE BLDC GLUCOMTR-MCNC: 202 MG/DL (ref 70–99)
GLUCOSE BLDC GLUCOMTR-MCNC: 220 MG/DL (ref 70–99)
GLUCOSE BLDC GLUCOMTR-MCNC: 264 MG/DL (ref 70–99)
GLUCOSE BLDC GLUCOMTR-MCNC: 71 MG/DL (ref 70–99)
GLUCOSE SERPL-MCNC: 114 MG/DL (ref 70–99)
GLUCOSE SERPL-MCNC: 246 MG/DL (ref 70–99)
KETONES BLD-SCNC: 0.2 MMOL/L (ref 0–0.6)
MAGNESIUM SERPL-MCNC: 2.4 MG/DL (ref 1.6–2.3)
OSMOLALITY SERPL: 300 MMOL/KG (ref 280–295)
OSMOLALITY SERPL: 307 MMOL/KG (ref 280–295)
PHOSPHATE SERPL-MCNC: 3.2 MG/DL (ref 2.5–4.5)
POTASSIUM SERPL-SCNC: 3.9 MMOL/L (ref 3.4–5.3)
POTASSIUM SERPL-SCNC: 4.5 MMOL/L (ref 3.4–5.3)
PROT SERPL-MCNC: 5.2 G/DL (ref 6.8–8.8)
SODIUM SERPL-SCNC: 138 MMOL/L (ref 133–144)
SODIUM SERPL-SCNC: 141 MMOL/L (ref 133–144)

## 2018-10-07 PROCEDURE — 25000132 ZZH RX MED GY IP 250 OP 250 PS 637: Mod: GY | Performed by: INTERNAL MEDICINE

## 2018-10-07 PROCEDURE — 00000146 ZZHCL STATISTIC GLUCOSE BY METER IP

## 2018-10-07 PROCEDURE — 82010 KETONE BODYS QUAN: CPT | Performed by: INTERNAL MEDICINE

## 2018-10-07 PROCEDURE — 80053 COMPREHEN METABOLIC PANEL: CPT | Performed by: INTERNAL MEDICINE

## 2018-10-07 PROCEDURE — 25000131 ZZH RX MED GY IP 250 OP 636 PS 637: Mod: GY | Performed by: INTERNAL MEDICINE

## 2018-10-07 PROCEDURE — 99238 HOSP IP/OBS DSCHRG MGMT 30/<: CPT | Performed by: INTERNAL MEDICINE

## 2018-10-07 PROCEDURE — 83735 ASSAY OF MAGNESIUM: CPT | Performed by: INTERNAL MEDICINE

## 2018-10-07 PROCEDURE — A9270 NON-COVERED ITEM OR SERVICE: HCPCS | Mod: GY | Performed by: INTERNAL MEDICINE

## 2018-10-07 PROCEDURE — 83930 ASSAY OF BLOOD OSMOLALITY: CPT | Performed by: INTERNAL MEDICINE

## 2018-10-07 PROCEDURE — 36415 COLL VENOUS BLD VENIPUNCTURE: CPT | Performed by: INTERNAL MEDICINE

## 2018-10-07 PROCEDURE — 80048 BASIC METABOLIC PNL TOTAL CA: CPT | Performed by: INTERNAL MEDICINE

## 2018-10-07 PROCEDURE — 84100 ASSAY OF PHOSPHORUS: CPT | Performed by: INTERNAL MEDICINE

## 2018-10-07 PROCEDURE — 25000128 H RX IP 250 OP 636: Performed by: INTERNAL MEDICINE

## 2018-10-07 RX ADMIN — INSULIN HUMAN 5 UNITS: 100 INJECTION, SOLUTION PARENTERAL at 01:58

## 2018-10-07 RX ADMIN — ASPIRIN 81 MG 81 MG: 81 TABLET ORAL at 09:34

## 2018-10-07 RX ADMIN — SODIUM CHLORIDE: 9 INJECTION, SOLUTION INTRAVENOUS at 05:58

## 2018-10-07 RX ADMIN — VITAMIN D, TAB 1000IU (100/BT) 2000 UNITS: 25 TAB at 09:34

## 2018-10-07 RX ADMIN — ENOXAPARIN SODIUM 40 MG: 40 INJECTION SUBCUTANEOUS at 00:20

## 2018-10-07 RX ADMIN — LEVOTHYROXINE SODIUM 50 MCG: 50 TABLET ORAL at 06:00

## 2018-10-07 RX ADMIN — METOPROLOL TARTRATE 50 MG: 50 TABLET, FILM COATED ORAL at 09:34

## 2018-10-07 ASSESSMENT — ACTIVITIES OF DAILY LIVING (ADL)
ADLS_ACUITY_SCORE: 9
ADLS_ACUITY_SCORE: 10

## 2018-10-07 NOTE — PLAN OF CARE
Problem: Patient Care Overview  Goal: Plan of Care/Patient Progress Review  Outcome: Completed Date Met: 10/07/18  VS-BG 71 this Am-large breakfast eaten -recheck 117. BG-134 before lunch-pt gives himself 5 units from implanted pump has lunch and discharges. Discharge instructions discussed in depth including: meds, f/u, when to seek medical attention.

## 2018-10-07 NOTE — PLAN OF CARE
Patient discharged at 1:30 PM via ambulation accompanied by other:family friend and staff. Prescriptions - None ordered for discharge. All belongings sent with patient.     Discharge instructions reviewed with pt. Listed belongings gathered and returned to patient. yes    Patient discharged to home.   Report called to n/a    Core Measures and Vaccines  Core Measures applicable during stay: No. If yes, state diagnosis: n/a  Pneumonia and Influenza given prior to discharge, if indicated: N/A and declines    Surgical Patient   Surgical Procedures during stay: no  Did patient receive discharge instruction on wound care and recognition of infection symptoms? N/A    MISC  Follow up appointment made:  No  Home and hospital aquired medications returned to patient: N/A  Patient reports pain was well managed at discharge: Yes

## 2018-10-07 NOTE — ED NOTES
Face to face report given with opportunity to observe patient.    Report given to EZEQUIEL Mcduffie   10/6/2018  7:00 PM

## 2018-10-07 NOTE — DISCHARGE SUMMARY
Admit Date:     10/06/2018   Discharge Date:           DISCHARGE DIAGNOSES:   1.  Uncontrolled diabetes mellitus type 2 with hyperosmolar nonketotic hyperglycemia.   2.  Acute kidney injury due to depletion.   3.  Diabetic neuropathy.   4.  Hypertension.      PROCEDURES:  None.      COMPLICATIONS:  None.      HOSPITAL COURSE:  Homer is a 58-year-old gentleman who states he has had diabetes mellitus for the last 25 years.  It started out requiring medications then eventually was insulin requiring.  He states that he is now a type 1 diabetic.  At any rate, he has been on an insulin pump for quite a few years.  Most recently, he has not been monitoring his sugars very well and has just sort of had a more laissez-faire attitude towards things.  Over the last month he has just been a little more dizzy.  Sugars have been kind of running on the higher side, not checking things as well and just getting generally weaker.  Finally, today on the day of his admission, he was extremely weak, had an episode where he stood up and basically passed out and fell to the ground, and came to the ER for evaluation.  He denied any fevers, chills, sweats.  No real infectious problems at all.  No nausea or vomiting.  Also has some loose stools after he had his gallbladder removed.  When he arrived here, his vital signs were a little hypertensive 160s-180s, pulse was in the 100 range.  He was afebrile.  SATS were normal on room air 100%.  His labs were markedly abnormal with sodium 127, potassium 5.9, chloride 91, CO2 is 23, BUN 43, creatinine is 1.97.  Anion gap was 13.  Albumin 4.  LFT is normal.  A1c was 11.4%.  CRP was negative.  Lactic acid was 2.3, negative procalcitonin, negative troponin-I, glucose was 774.  White count 7000, hemoglobin is 15.1, platelet count is 199,000.  He had blood cultures done.  Chest x-ray was negative.  Urine was unremarkable.  There was no real evidence of any infection.  He was given IV fluids, felt to be  on the dry side, was also given a total of 10 units of IV regular insulin.  He was admitted to our facility under the direction of Dr. Dr. Ken.  Dr. Ken then decided to have the patient use his own insulin pump to monitor things.  Gradually overnight his sugars came under control into the 600 range, 200s, and then the low 200s, to the 100 range.  Markedly improved.  His ketones were normal by morning at 0.2.  His labs this morning finally had a sodium of 141, potassium 3.9, chloride 112, CO2 of 24, BUN 37, creatinine 1.46.  Liver profile normal.  Lactic acid was normal.  The patient was eating without difficulty.  He did receive over 3 liters of fluid, was voiding without difficulty also.      In speaking with the patient, he thinks what happened is his pump ran out and he really has not been keeping track of things very well and kind of let things get out of control.  He readily admits that.  Currently, he feels actually the best he has felt in quite a while.  He has no shortness of breath, no chest pain, nausea, vomiting, abdominal pain.  He does have significant peripheral neuropathy.  Has had no real chest pains.  These episodes that he called fainting were more related to when his sugars were definitely out of whack.  Generally when he was up standing, he felt that he was probably a little on the dry side of things which caused him to fall.      So at this point, he looks very good.  He has not followed up with Dr. Stockton and missed his last appointment.  I did recommend the patient consider getting hooked up with an endocrinologist, which he has not done since he has moved up here to [place].  He does not have anybody really running his pump for him at all.  Just was doing it on his own.  Does not have a sensor.  He understands the severity of his current presentation and is going to take a little more active role in checking sugars and adjusting his boluses, appropriately get his A1c back down to a  reasonable level.  In the past it been down to 8 range.  His blood pressure is currently in the 150 range, heart rates in the 80s.  He is afebrile.  sats remained 96% on room air.  So at this point, he feels well enough that he will be able to be discharged home.  Tolerating a diet well without troubles.      DISCHARGE MEDICATIONS:  Will be basically the same.     1.  He is on aspirin daily.   2.  Cinnamon 500 mg daily.   3.  Hydrochlorothiazide 25 mg daily.   4.  Levothyroxine 50 mcg daily.   5.  Lisinopril 40 mg daily.   6.  Metoprolol tartrate 50 b.i.d.   7.  Vitamin daily.   8.  Crestor 10 mg daily.   9.  Vitamin C.    10.  Vitamin D.   11.  Insulin pump NovoLog.  His current rate, he believes is 1.5 units per hour for 6 hours, then 1 unit per hour for 6 hours, then 1.5 units per hour for 6 hours, then back to 1 unit per hour.      The patient should see Dr. Stockton in the next 7 days for followup.  Consideration of Endocrinology consultation.  Diet should be consistent carb diet.  Activities as tolerated.  Consider outpatient stress test if the patient does develop any other symptoms, but most of his presenting complaints and issues were related to his poorly controlled diabetes.      CODE STATUS:  He is a full code at this time.         MAURICIO THOMSON MD             D: 10/07/2018   T: 10/07/2018   MT: CC      Name:     DUNCAN GUILLEN   MRN:      [id number]        Account:        [id number]   :      1960           Admit Date:     10/06/2018                                  Discharge Date:       Document: [id number]       cc: Russ Stockton DO

## 2018-10-07 NOTE — H&P
[facility]    History and Physical  Hospitalist       Date of Admission:  10/6/2018    Assessment & Plan   Homer Triana is a 58 year old man with past medical history significant for diabetes type 2, on insulin pump, diabetic neuropathy, diabetic retinopathy, hyperthyroidism, who presents to emergency room with fatigue and generalized weakness, dizziness, and 2 syncopal episodes during the last 2 weeks.  He was admitted for hyperglycemic hyperosmolar state, dehydration, acute kidney injury.  He meets inpatient criteria.    Principal Problem:    Uncontrolled type 2 DM with hyperosmolar nonketotic hyperglycemia (H)    Assessment: This is due to noncompliance.  Infection was ordered but not found.  Treated appropriately in the emergency room, repeated labs show some improvement.    Plan: We will admit to medical floor because there are no ICU beds, and the patient is stable, and will [facility].  We will not use insulin pump until his glycemia controlled.  She already received 3 L of normal saline in the emergency room, 15 units of insulin given total, and repeated labs shows glycemia improvement, creatinine improving his mental status is stable, and he is not on hypovolemic shock.  We will manage him on medical floor with telemetry.    Active Problems:    Syncope, unspecified syncope type    Assessment: At this moment I cannot definitely diagnose his 2 syncopal episodes.  He complains of back pain but acute coronary syndrome ruled out and EKG is normal, bedside ultrasound is normal.    Plan: We will keep on telemetry, will get echo Monday, monitor electrolytes.  Consider outpatient stress test.      Diabetic neuropathy (H)    Assessment: Chronic, present on admission.    Plan: Continue home medications      Back pain    Assessment: Impossible to make a definite diagnosis of his symptoms.  Acute coronary syndrome ruled out, aortic dissection  ruled out.  No tenderness on palpation.  Also ruled out pericardial effusion. Could be due to acid reflux (pain improves sitting and eating!).     Plan: Check sed rate, CRP. Will also try PPI and esophagogram.       Generalized muscle weakness    Assessment: Likely due to uncontrolled diabetes and dehydration    Plan: We will get physical therapy assessment in the morning, hold statins, check CPK      Essential hypertension    Assessment: Essential hypertension history    Plan: Continue beta-blockers but hold ACE inhibitors, Toradol as needed      Acute kidney injury (H)    Assessment: Creatinine started to improve in the emergency room and this is most likely due to prerenal, hypovolemia    Plan: We will hydrate and monitor    HyperNa-wil  Assessment: Hypovolemic hyponatremia and part of it is pseudohyponatremia due to hyperglycemia  We will monitor and with correction of glycemia it should get better.    HyperK-wil  Most likely due to under excretion due to acute kidney injury  Plan is to hydrate hold ACE inhibitors and monitor.      # Pain Assessment:   - Homer is experiencing pain due to back pain which is not diagnostically clear. Pain management was discussed and the plan was created in a collaborative fashion.  Homer's response to the current recommendations: engaged  - Tylenol for now    DVT Prophylaxis: Enoxaparin (Lovenox) SQ  Code Status: Full Code    Disposition: Expected discharge in 1-2  days once HHS treated, renal function back to normal, glycemia controlled and verified that insulin pump is appropriately working.     Kamila Ken MD,     Primary Care Physician   Russ Stockton    Chief Complaint   Weakness, fatigue, dizziness and 2x syncope, and back pain x 1 mo    History is obtained from the patient, electronic health record and emergency department physician    History of Present Illness   HPI reliable.   Homer Triana is a 58 year old man with PMH of DM, hypothyroidism,  hypertension, neuropathy, retinopathy on disability x 3 years, comes in to eD c/o fatigue, weakness, dizziness x 1 mo. He has not been checking his blood glucose for some time. He is using insulin pump x 6 years. His pump does not read glycemic levels.   He was complaining of low neck pain-upper back pain, which gets better with sitting position or eating!! Pain is not exertional. He has no h/o CAD, but many risk factors for this dz. He admits that his bm are loose since his GB was removed.   He also stated that he has had 2 syncopal episodes, which were not exertional and no chest discomfort was associated with this.   ED: he was found with HHS consistent sx constellation (glucose 774, pH 7.31 carbon CO2 23 ), dehydration, pseudohyponatremia, hyperkalemia and acute kidney injury.  He was appropriately treated and started showing improvement.  I did bedside ultrasound which showed good ejection fraction of the left ventricle no pericardial effusion.  Made a decision to admit medical surgical floor with telemetry instead of ICU for insulin drip.  He is inpatient criteria, but may state less than 2 midnights.    Past Medical History    I have reviewed this patient's medical history and updated it with pertinent information if needed.   Past Medical History:   Diagnosis Date     Diabetes (H)     type 1     High cholesterol      Hypertension      Thyroid disease        Past Surgical History   I have reviewed this patient's surgical history and updated it with pertinent information if needed.  History reviewed. No pertinent surgical history.    Prior to Admission Medications   Prior to Admission Medications   Prescriptions Last Dose Informant Patient Reported? Taking?   ASPIRIN PO 10/6/2018 at Unknown time  Yes Yes   Sig: Take 81 mg by mouth daily   Ascorbic Acid (VITAMIN C PO) 10/5/2018 at Unknown time  Yes Yes   Sig: Take 500 mg by mouth daily   Cholecalciferol (VITAMIN D-3) 1000 units CAPS 10/5/2018 at Unknown time   Yes Yes   Sig: Take 2 capsules by mouth daily   Flax Oil-Fish Oil-Borage Oil (FISH OIL-FLAX OIL-BORAGE OIL PO) 10/5/2018 at Unknown time  Yes Yes   Sig: Take 1 capsule by mouth daily   HYDROCHLOROTHIAZIDE PO 10/5/2018 at Unknown time  Yes Yes   Sig: Take 25 mg by mouth daily   Insulin Aspart (NOVOLOG SC)   Yes Yes   Sig: Insulin pump   LEVOTHYROXINE SODIUM PO 10/5/2018 at Unknown time  Yes Yes   Sig: Take 50 mcg by mouth daily   LISINOPRIL PO 10/5/2018 at Unknown time  Yes Yes   Sig: Take 40 mg by mouth daily   METOPROLOL TARTRATE PO 10/5/2018 at Unknown time  Yes Yes   Sig: Take 50 mg by mouth 2 times daily   ROSUVASTATIN CALCIUM PO 10/5/2018 at Unknown time  Yes Yes   Sig: Take 10 mg by mouth daily   cinnamon 500 MG CAPS 10/5/2018 at Unknown time  Yes Yes   Sig: Take 1 capsule by mouth daily   multivitamin, therapeutic with minerals (MULTI-VITAMIN) TABS tablet 10/5/2018 at Unknown time  Yes Yes   Sig: Take 1 tablet by mouth daily      Facility-Administered Medications: None     Allergies   No Known Allergies    Social History   I have reviewed this patient's social history and updated it with pertinent information if needed. Homer Triana  reports that he has quit smoking. He has never used smokeless tobacco. He reports that he drinks alcohol. He reports that he does not use illicit drugs.    Family History   I have reviewed this patient's family history and updated it with pertinent information if needed.   No family history on file.    Review of Systems   As per history of present illness.  14 points obtained.  The rest is negative pertinent positives and negatives included in history of present illness.    Physical Exam   Temp: 96  F (35.6  C) Temp src: Tympanic BP: 156/88 Pulse: 96 Heart Rate: 94 Resp: 16 SpO2: 97 % O2 Device: None (Room air)    Vital Signs with Ranges  Temp:  [96  F (35.6  C)] 96  F (35.6  C)  Pulse:  [] 96  Heart Rate:  [] 94  Resp:  [16] 16  BP: (148-205)/()  156/88  SpO2:  [95 %-100 %] 97 %  0 lbs 0 oz    Physical exam  General: He is awake alert in no distress.  Oriented x4.  HEENT: Mucous membranes moist.  Neck: No JVD: No thyromegaly.  Cardiac: No murmurs gallops or rubs regular rhythm and rate.  Lungs: Clear to auscultation bilaterally anterior and posterior  GI: Abdomen nondistended, soft, nontender, no hepatosplenomegaly.  : Urinary bladder not palpable, CVA nontender.  Musculoskeletal: Dupuytren's contractures palmar aponeurosis, no major joint effusion, full range of motion major joints.  Skin: Increase hirsutism, spotty rash  Neurologic/psychiatric exam: His muscle strength, reflexes normal.  Decreased sensation consistent with diabetic neuropathy.  Is awake alert oriented x4.  Hematologic lymphatic: No petechia no ecchymosis, no lymphadenopathy    Data   Data reviewed today:  I personally reviewed the EKG tracing showing Normal sinus rhythm ischemic changes and the chest x-ray image(s) showing No acute pulmonary process.    Recent Labs  Lab 10/06/18  1901 10/06/18  1710   WBC  --  7.4   HGB  --  15.1   MCV  --  93   PLT  --  199   * 127*   POTASSIUM 5.7* 5.9*   CHLORIDE 98 91*   CO2 26 23   BUN 42* 43*   CR 1.84* 1.97*   ANIONGAP 7 13   DESMOND 8.0* 8.9   * 774*   ALBUMIN 3.6 4.0   PROTTOTAL 6.5* 7.2   BILITOTAL 0.7 0.9   ALKPHOS 102 118   ALT 45 50   AST 17 23   TROPI  --  <0.015       No results found for this or any previous visit (from the past 24 hour(s)).

## 2018-10-07 NOTE — PLAN OF CARE
Problem: Patient Care Overview  Goal: Plan of Care/Patient Progress Review  Outcome: Improving   10/07/18 0459   OTHER   Plan Of Care Reviewed With patient   Plan of Care Review   Progress improving     Blood sugars down from arrival in emergency room, patient states he feels much better than when he arrived.  Used his own insulin pump once during night per md order and it did drop sugars, proving that it works.  Unsure why he had problems at home.  Currently waiting on new am lab draw.

## 2018-10-07 NOTE — PLAN OF CARE
[facility] Inpatient Admission Note:    Patient admitted to 3106/3106-1 at approximately 2235 via cart accompanied by transport tech from emergency room . Report received from RN in SBAR format at 2230 via telephone. Patient ambulated to bed via self.. Patient is alert and oriented X 3, denies pain; rates at 0 on 0-10 scale.  Patient oriented to room, unit, hourly rounding, and plan of care. Explained admission packet and patient handbook with patient bill of rights brochure. Will continue to monitor and document as needed.     Inpatient Nursing criteria listed below was met:    Health care directives status obtained and documented: Yes    Care Everywhere authorization obtained No    MRSA swab completed for patient 65 years and older: N/A    Patient identifies a surrogate decision maker: Yes If yes, who:sister Contact Information:    Core Measure diagnosis present:No. If yes, state diagnosis: DM     If initial lactic acid >2.0, repeat lactic acid drawn within one hour of arrival to unit: NA. If no, state reason: md denied need    Vaccination assessment and education completed: Yes   Vaccinations received prior to admission: Pneumovax no  Influenza(seasonal)  NO   Vaccination(s) ordered: not given today because pt states he gets it free at [facility] visit ordered if patient requests: N/A    Skin issues/needs documented: Yes    Isolation Patient: no Education given, correct sign in place and documentation row added to PCS:  No    Fall Prevention No: Care plan updated, education given and documented, sticker and magnet in place: N/A    Care Plan initiated: Yes    Education Documented (including assessment): Yes    Patient has discharge needs : yes If yes, please explain:needs a local endocrinologist

## 2018-10-07 NOTE — ED NOTES
DATE:  10/6/2018   TIME OF RECEIPT FROM LAB:  1924  LAB TEST:  Glucose  LAB VALUE:  607  RESULTS GIVEN WITH READ-BACK TO (PROVIDER):  SITA Leyva  TIME LAB VALUE REPORTED TO PROVIDER:   1925

## 2018-10-07 NOTE — PROGRESS NOTES
Assessment completed see flowsheet.      LOC: alert    Others present: Patient    Dx: Hyperglycemia    Lives with: Lives With: alone    Living at: Living Arrangements: house    Equipment used:  Blood pressure machine and Insulin pump    Support System: Description of Support System: Supportive, Involved    Primary Care Provider: Russ Stockton    POA/Guardian: No     Health Care Directive: NO, provided information    Pharmacy: [facility]    :  no    Homecare/[facility] Services:   No, his sister cleans for him weekly    Adequate Resources for needs (housing, utilities, food/med): YES    Meds and appointments management: YES    Work: NO    Transportation: YES     ADLs: independent    Falls: No    Able to Return to Prior Living Arrangements: recently do to uncontrolled blood sugars    D[place]: NO    Goals: get out    Barriers: medication non compliance    Needs: Demonstrates Competency    FLORENTIN: low    Discharge Plan: home    Laurie discharged today.

## 2018-10-08 ENCOUNTER — TELEPHONE (OUTPATIENT)
Dept: CASE MANAGEMENT | Facility: HOSPITAL | Age: 58
End: 2018-10-08

## 2018-10-08 NOTE — TELEPHONE ENCOUNTER
Homer Triana, was discharged to home on Oct 7   from [facility]. I spoke today with him regarding his  discharge.   He indicates he has receive(d) sufficient information upon discharge. Medications were reviewed in full on discharge, including: medications to be continued from preadmission and any side effects. Prescriptions - None received at discharge.  Medications are being taken as prescribed.   He indicates he has contacted [facility] for his f/u appointment. The clinic will be calling him back with date/time.   He did not have any questions regarding discharge instructions or condition.  Per their request, the following employee (s) can be recognized for their outstanding services delivered:  His hospital stay was like staying at the [facility]. Everyone was wonderful.   Suggestions to improve service: Nothing indicated.   He was informed he  may receive a survey in the mail from the hospital. Asked if he  would kindly complete the survey in order for [facility] to know if services fully met patient needs.

## 2018-10-09 NOTE — ED NOTES
DATE:  10/9/2018   TIME OF RECEIPT FROM LAB:  0514  LAB TEST:  Blood Cultures (Anerobic bottle, first set)  LAB VALUE:  Gram + cocci in clusters  RESULTS GIVEN WITH READ-BACK TO (PROVIDER):  Dr. Ruiz  TIME LAB VALUE REPORTED TO PROVIDER:   0525    No new orders at this time. Per Dr. Ruiz will wait for full blood culture results.

## 2018-10-11 LAB
BACTERIA SPEC CULT: ABNORMAL
Lab: ABNORMAL
SPECIMEN SOURCE: ABNORMAL

## 2018-10-12 LAB
BACTERIA SPEC CULT: NORMAL
Lab: NORMAL
SPECIMEN SOURCE: NORMAL

## 2018-10-12 NOTE — UTILIZATION REVIEW
"  Admission Status; Secondary Review Determination       Admission Status; Secondary Review Determination       As part of the [facility] Utilization review plan, a self-audit is done on Medicare inpatient admission with less than 2 midnights stay. The 2014 IPPS Final Rule allows outpatient billing in the event that a hospital determines that an inpatient admission was not medically necessary under utilization review process.      (x) Outpatient status would be Appropriate- Short Stay- Post discharge review.     RATIONALE FOR DETERMINATION   58 year old man with past medical history significant for diabetes type 2, on insulin pump, diabetic neuropathy, diabetic retinopathy, hyperthyroidism, who presents to emergency room with fatigue and generalized weakness, dizziness, and 2 syncopal episodes during the last 2 weeks.  He was admitted for hyperglycemic hyperosmolar state, dehydration, acute kidney injury.  This is due to noncompliance.  Infection was ordered but not found.  Treated in the emergency room, repeated labs showed improvement.   By the time the patient was admitted, his blood sugar significantly improved: \"he already received 3 L of normal saline in the emergency room, 15 units of insulin given total, and repeated labs shows glycemia improvement, creatinine improving his mental status is stable, and he is not on hypovolemic shock.\"  The time of admission patient was expected to have a short stay recommended in the admission note.  Patient was admitted and discharge after one night stay. Record was sent by  for a PA review. Based on the  severity of illness, intensity of service provided, expected LOS and risk for adverse outcome make the care appropriate for further outpatient/observation; however, doesn't meet criteria for hospital inpatient admission.         The information on this document is developed by the utilization review team in order for the business office to ensure compliance.  This only " denotes the appropriateness of proper admission status and does not reflect the quality of care rendered.         The definitions of Inpatient Status and Observation Status used in making the determination above are those provided in the CMS Coverage Manual, Chapter 1 and Chapter 6, section 70.4.      Sincerely,       CELSA WEBER MD    System Medical Director  Utilization  [facility].

## 2018-10-25 ENCOUNTER — TRANSFERRED RECORDS (OUTPATIENT)
Dept: HEALTH INFORMATION MANAGEMENT | Facility: CLINIC | Age: 58
End: 2018-10-25

## 2018-10-25 LAB
CHOLEST SERPL-MCNC: 182 MG/DL
CHOLEST SERPL-MCNC: 182 MG/DL
HBA1C MFR BLD: 10.9 % (ref 4–6)
HDLC SERPL-MCNC: 64 MG/DL
HDLC SERPL-MCNC: 64 MG/DL
LDL CHOLESTEROL: 98 MG/DL
LDLC SERPL CALC-MCNC: 98 MG/DL
NONHDLC SERPL-MCNC: NORMAL MG/DL
TRIGL SERPL-MCNC: 99 MG/DL
TRIGLYCERIDES (EXTERNAL): 99

## 2018-11-22 ENCOUNTER — HOSPITAL ENCOUNTER (INPATIENT)
Facility: OTHER | Age: 58
LOS: 1 days | Discharge: HOME | DRG: 638 | End: 2018-11-23
Attending: FAMILY MEDICINE | Admitting: INTERNAL MEDICINE
Payer: MEDICARE

## 2018-11-22 DIAGNOSIS — E10.42 TYPE 1 DIABETES MELLITUS WITH POLYNEUROPATHY (H): ICD-10-CM

## 2018-11-22 DIAGNOSIS — I10 ESSENTIAL HYPERTENSION, MALIGNANT: ICD-10-CM

## 2018-11-22 DIAGNOSIS — Z96.41 INSULIN PUMP STATUS: ICD-10-CM

## 2018-11-22 DIAGNOSIS — E10.10 DKA, TYPE 1, NOT AT GOAL (H): ICD-10-CM

## 2018-11-22 DIAGNOSIS — Z87.891 PERSONAL HISTORY OF TOBACCO USE, PRESENTING HAZARDS TO HEALTH: ICD-10-CM

## 2018-11-22 PROBLEM — E11.10 DKA (DIABETIC KETOACIDOSIS) (H): Status: ACTIVE | Noted: 2018-11-22

## 2018-11-22 LAB
ALBUMIN SERPL-MCNC: 4.4 G/DL (ref 3.5–5.7)
ALBUMIN UR-MCNC: NEGATIVE MG/DL
ALP SERPL-CCNC: 100 U/L (ref 34–104)
ALT SERPL W P-5'-P-CCNC: 39 U/L (ref 7–52)
ANION GAP SERPL CALCULATED.3IONS-SCNC: 11 MMOL/L (ref 3–14)
ANION GAP SERPL CALCULATED.3IONS-SCNC: 8 MMOL/L (ref 3–14)
APPEARANCE UR: CLEAR
AST SERPL W P-5'-P-CCNC: 33 U/L (ref 13–39)
BASOPHILS # BLD AUTO: 0.1 10E9/L (ref 0–0.2)
BASOPHILS NFR BLD AUTO: 0.6 %
BILIRUB SERPL-MCNC: 1.1 MG/DL (ref 0.3–1)
BILIRUB UR QL STRIP: NEGATIVE
BUN SERPL-MCNC: 43 MG/DL (ref 7–25)
BUN SERPL-MCNC: 51 MG/DL (ref 7–25)
CALCIUM SERPL-MCNC: 9.1 MG/DL (ref 8.6–10.3)
CALCIUM SERPL-MCNC: 9.2 MG/DL (ref 8.6–10.3)
CHLORIDE SERPL-SCNC: 102 MMOL/L (ref 98–107)
CHLORIDE SERPL-SCNC: 90 MMOL/L (ref 98–107)
CO2 SERPL-SCNC: 22 MMOL/L (ref 21–31)
CO2 SERPL-SCNC: 24 MMOL/L (ref 21–31)
COLOR UR AUTO: YELLOW
CREAT SERPL-MCNC: 1.55 MG/DL (ref 0.7–1.3)
CREAT SERPL-MCNC: 1.87 MG/DL (ref 0.7–1.3)
DIFFERENTIAL METHOD BLD: NORMAL
EOSINOPHIL # BLD AUTO: 0.1 10E9/L (ref 0–0.7)
EOSINOPHIL NFR BLD AUTO: 0.9 %
ERYTHROCYTE [DISTWIDTH] IN BLOOD BY AUTOMATED COUNT: 11.2 % (ref 10–15)
GFR SERPL CREATININE-BSD FRML MDRD: 38 ML/MIN/1.7M2
GFR SERPL CREATININE-BSD FRML MDRD: 47 ML/MIN/1.7M2
GLUCOSE SERPL-MCNC: 192 MG/DL (ref 70–105)
GLUCOSE SERPL-MCNC: 565 MG/DL (ref 70–105)
GLUCOSE SERPL-MCNC: 79 MG/DL (ref 70–105)
GLUCOSE SERPL-MCNC: 796 MG/DL (ref 70–105)
GLUCOSE UR STRIP-MCNC: >1000 MG/DL
HCT VFR BLD AUTO: 41.5 % (ref 40–53)
HGB BLD-MCNC: 14.2 G/DL (ref 13.3–17.7)
HGB UR QL STRIP: ABNORMAL
IMM GRANULOCYTES # BLD: 0 10E9/L (ref 0–0.4)
IMM GRANULOCYTES NFR BLD: 0.4 %
INR PPP: 0.83 (ref 0–1.3)
KETONES BLD-SCNC: 1.8 MMOL/L (ref 0–0.6)
KETONES UR STRIP-MCNC: ABNORMAL MG/DL
LEUKOCYTE ESTERASE UR QL STRIP: NEGATIVE
LYMPHOCYTES # BLD AUTO: 0.8 10E9/L (ref 0.8–5.3)
LYMPHOCYTES NFR BLD AUTO: 9.6 %
MAGNESIUM SERPL-MCNC: 2.2 MG/DL (ref 1.9–2.7)
MCH RBC QN AUTO: 31.7 PG (ref 26.5–33)
MCHC RBC AUTO-ENTMCNC: 34.2 G/DL (ref 31.5–36.5)
MCV RBC AUTO: 93 FL (ref 78–100)
MONOCYTES # BLD AUTO: 0.7 10E9/L (ref 0–1.3)
MONOCYTES NFR BLD AUTO: 7.8 %
NEUTROPHILS # BLD AUTO: 6.8 10E9/L (ref 1.6–8.3)
NEUTROPHILS NFR BLD AUTO: 80.7 %
NITRATE UR QL: NEGATIVE
PH UR STRIP: 5 PH (ref 5–9)
PHOSPHATE SERPL-MCNC: 2.6 MG/DL (ref 2.5–5)
PLATELET # BLD AUTO: 208 10E9/L (ref 150–450)
POTASSIUM SERPL-SCNC: 4.9 MMOL/L (ref 3.5–5.1)
POTASSIUM SERPL-SCNC: 6.7 MMOL/L (ref 3.5–5.1)
PROT SERPL-MCNC: 6.9 G/DL (ref 6.4–8.9)
RBC # BLD AUTO: 4.48 10E12/L (ref 4.4–5.9)
RBC #/AREA URNS AUTO: ABNORMAL /HPF
SODIUM SERPL-SCNC: 123 MMOL/L (ref 134–144)
SODIUM SERPL-SCNC: 134 MMOL/L (ref 134–144)
SOURCE: ABNORMAL
SP GR UR STRIP: 1.01 (ref 1–1.03)
UROBILINOGEN UR STRIP-ACNC: 0.2 EU/DL (ref 0.2–1)
WBC # BLD AUTO: 8.5 10E9/L (ref 4–11)
WBC #/AREA URNS AUTO: ABNORMAL /HPF

## 2018-11-22 PROCEDURE — 99285 EMERGENCY DEPT VISIT HI MDM: CPT | Mod: 25 | Performed by: FAMILY MEDICINE

## 2018-11-22 PROCEDURE — 93005 ELECTROCARDIOGRAM TRACING: CPT | Performed by: FAMILY MEDICINE

## 2018-11-22 PROCEDURE — 81001 URINALYSIS AUTO W/SCOPE: CPT | Performed by: FAMILY MEDICINE

## 2018-11-22 PROCEDURE — 84100 ASSAY OF PHOSPHORUS: CPT | Performed by: INTERNAL MEDICINE

## 2018-11-22 PROCEDURE — 99223 1ST HOSP IP/OBS HIGH 75: CPT | Mod: AI | Performed by: INTERNAL MEDICINE

## 2018-11-22 PROCEDURE — 36415 COLL VENOUS BLD VENIPUNCTURE: CPT | Mod: 91 | Performed by: FAMILY MEDICINE

## 2018-11-22 PROCEDURE — 25000125 ZZHC RX 250: Performed by: FAMILY MEDICINE

## 2018-11-22 PROCEDURE — 83735 ASSAY OF MAGNESIUM: CPT | Performed by: INTERNAL MEDICINE

## 2018-11-22 PROCEDURE — 83930 ASSAY OF BLOOD OSMOLALITY: CPT | Performed by: INTERNAL MEDICINE

## 2018-11-22 PROCEDURE — 80053 COMPREHEN METABOLIC PANEL: CPT | Performed by: FAMILY MEDICINE

## 2018-11-22 PROCEDURE — 36415 COLL VENOUS BLD VENIPUNCTURE: CPT | Performed by: FAMILY MEDICINE

## 2018-11-22 PROCEDURE — 93010 ELECTROCARDIOGRAM REPORT: CPT | Performed by: INTERNAL MEDICINE

## 2018-11-22 PROCEDURE — 96361 HYDRATE IV INFUSION ADD-ON: CPT | Performed by: FAMILY MEDICINE

## 2018-11-22 PROCEDURE — 99285 EMERGENCY DEPT VISIT HI MDM: CPT | Mod: Z6 | Performed by: FAMILY MEDICINE

## 2018-11-22 PROCEDURE — 82947 ASSAY GLUCOSE BLOOD QUANT: CPT | Performed by: FAMILY MEDICINE

## 2018-11-22 PROCEDURE — 85610 PROTHROMBIN TIME: CPT | Performed by: FAMILY MEDICINE

## 2018-11-22 PROCEDURE — 96365 THER/PROPH/DIAG IV INF INIT: CPT | Performed by: FAMILY MEDICINE

## 2018-11-22 PROCEDURE — 85025 COMPLETE CBC W/AUTO DIFF WBC: CPT | Performed by: FAMILY MEDICINE

## 2018-11-22 PROCEDURE — 96368 THER/DIAG CONCURRENT INF: CPT | Performed by: FAMILY MEDICINE

## 2018-11-22 PROCEDURE — 80048 BASIC METABOLIC PNL TOTAL CA: CPT | Performed by: INTERNAL MEDICINE

## 2018-11-22 PROCEDURE — 82010 KETONE BODYS QUAN: CPT | Performed by: FAMILY MEDICINE

## 2018-11-22 PROCEDURE — 82947 ASSAY GLUCOSE BLOOD QUANT: CPT | Performed by: INTERNAL MEDICINE

## 2018-11-22 PROCEDURE — 25000128 H RX IP 250 OP 636: Performed by: FAMILY MEDICINE

## 2018-11-22 PROCEDURE — 20000006 ZZH R&B ICU - GICH

## 2018-11-22 PROCEDURE — 36415 COLL VENOUS BLD VENIPUNCTURE: CPT | Performed by: INTERNAL MEDICINE

## 2018-11-22 RX ORDER — POTASSIUM CHLORIDE 7.45 MG/ML
10 INJECTION INTRAVENOUS
Status: DISCONTINUED | OUTPATIENT
Start: 2018-11-22 | End: 2018-11-23 | Stop reason: HOSPADM

## 2018-11-22 RX ORDER — NICOTINE POLACRILEX 4 MG
15-30 LOZENGE BUCCAL
Status: DISCONTINUED | OUTPATIENT
Start: 2018-11-22 | End: 2018-11-23 | Stop reason: HOSPADM

## 2018-11-22 RX ORDER — ONDANSETRON 4 MG/1
4 TABLET, ORALLY DISINTEGRATING ORAL EVERY 6 HOURS PRN
Status: DISCONTINUED | OUTPATIENT
Start: 2018-11-22 | End: 2018-11-23 | Stop reason: HOSPADM

## 2018-11-22 RX ORDER — POTASSIUM CHLORIDE 1500 MG/1
20-40 TABLET, EXTENDED RELEASE ORAL
Status: DISCONTINUED | OUTPATIENT
Start: 2018-11-22 | End: 2018-11-23 | Stop reason: HOSPADM

## 2018-11-22 RX ORDER — SODIUM CHLORIDE 9 MG/ML
1000 INJECTION, SOLUTION INTRAVENOUS CONTINUOUS
Status: DISCONTINUED | OUTPATIENT
Start: 2018-11-22 | End: 2018-11-23 | Stop reason: HOSPADM

## 2018-11-22 RX ORDER — HYDRALAZINE HYDROCHLORIDE 20 MG/ML
10 INJECTION INTRAMUSCULAR; INTRAVENOUS EVERY 4 HOURS PRN
Status: DISCONTINUED | OUTPATIENT
Start: 2018-11-22 | End: 2018-11-23 | Stop reason: HOSPADM

## 2018-11-22 RX ORDER — NALOXONE HYDROCHLORIDE 0.4 MG/ML
.1-.4 INJECTION, SOLUTION INTRAMUSCULAR; INTRAVENOUS; SUBCUTANEOUS
Status: DISCONTINUED | OUTPATIENT
Start: 2018-11-22 | End: 2018-11-23 | Stop reason: HOSPADM

## 2018-11-22 RX ORDER — SODIUM CHLORIDE 9 MG/ML
INJECTION, SOLUTION INTRAVENOUS CONTINUOUS
Status: DISCONTINUED | OUTPATIENT
Start: 2018-11-22 | End: 2018-11-22

## 2018-11-22 RX ORDER — DEXTROSE MONOHYDRATE 25 G/50ML
25-50 INJECTION, SOLUTION INTRAVENOUS
Status: DISCONTINUED | OUTPATIENT
Start: 2018-11-22 | End: 2018-11-23 | Stop reason: HOSPADM

## 2018-11-22 RX ORDER — PROCHLORPERAZINE MALEATE 10 MG
10 TABLET ORAL EVERY 6 HOURS PRN
Status: DISCONTINUED | OUTPATIENT
Start: 2018-11-22 | End: 2018-11-23 | Stop reason: HOSPADM

## 2018-11-22 RX ORDER — MAGNESIUM SULFATE HEPTAHYDRATE 40 MG/ML
2 INJECTION, SOLUTION INTRAVENOUS DAILY PRN
Status: DISCONTINUED | OUTPATIENT
Start: 2018-11-22 | End: 2018-11-23 | Stop reason: HOSPADM

## 2018-11-22 RX ORDER — PROCHLORPERAZINE 25 MG
25 SUPPOSITORY, RECTAL RECTAL EVERY 12 HOURS PRN
Status: DISCONTINUED | OUTPATIENT
Start: 2018-11-22 | End: 2018-11-23 | Stop reason: HOSPADM

## 2018-11-22 RX ORDER — DEXTROSE MONOHYDRATE 25 G/50ML
25-50 INJECTION, SOLUTION INTRAVENOUS
Status: DISCONTINUED | OUTPATIENT
Start: 2018-11-22 | End: 2018-11-22

## 2018-11-22 RX ORDER — MAGNESIUM SULFATE HEPTAHYDRATE 40 MG/ML
4 INJECTION, SOLUTION INTRAVENOUS EVERY 4 HOURS PRN
Status: DISCONTINUED | OUTPATIENT
Start: 2018-11-22 | End: 2018-11-23 | Stop reason: HOSPADM

## 2018-11-22 RX ORDER — ONDANSETRON 2 MG/ML
4 INJECTION INTRAMUSCULAR; INTRAVENOUS EVERY 6 HOURS PRN
Status: DISCONTINUED | OUTPATIENT
Start: 2018-11-22 | End: 2018-11-23 | Stop reason: HOSPADM

## 2018-11-22 RX ORDER — BISACODYL 10 MG
10 SUPPOSITORY, RECTAL RECTAL DAILY PRN
Status: DISCONTINUED | OUTPATIENT
Start: 2018-11-22 | End: 2018-11-23 | Stop reason: HOSPADM

## 2018-11-22 RX ORDER — NICOTINE POLACRILEX 4 MG
15-30 LOZENGE BUCCAL
Status: DISCONTINUED | OUTPATIENT
Start: 2018-11-22 | End: 2018-11-22

## 2018-11-22 RX ADMIN — SODIUM CHLORIDE 1000 ML: 900 INJECTION, SOLUTION INTRAVENOUS at 15:49

## 2018-11-22 RX ADMIN — SODIUM CHLORIDE 1000 ML: 900 INJECTION, SOLUTION INTRAVENOUS at 20:31

## 2018-11-22 RX ADMIN — SODIUM CHLORIDE 1000 ML: 900 INJECTION, SOLUTION INTRAVENOUS at 17:14

## 2018-11-22 RX ADMIN — INSULIN HUMAN 5.5 UNITS/HR: 100 INJECTION, SOLUTION PARENTERAL at 16:44

## 2018-11-22 RX ADMIN — CALCIUM GLUCONATE 1 G: 94 INJECTION, SOLUTION INTRAVENOUS at 16:45

## 2018-11-22 ASSESSMENT — ACTIVITIES OF DAILY LIVING (ADL)
TRANSFERRING: 0-->INDEPENDENT
COGNITION: 0 - NO COGNITION ISSUES REPORTED
ADLS_ACUITY_SCORE: 12
SWALLOWING: 0-->SWALLOWS FOODS/LIQUIDS WITHOUT DIFFICULTY
TOILETING: 1 - ASSISTIVE EQUIPMENT
SWALLOWING: 0 - SWALLOWS FOODS/LIQUIDS WITHOUT DIFFICULTY
DRESS: 0-->INDEPENDENT
TRANSFERRING: 1 - ASSISTIVE EQUIPMENT
BATHING: 1 - ASSISTIVE EQUIPMENT
FALL_HISTORY_WITHIN_LAST_SIX_MONTHS: NO
EATING: 0 - INDEPENDENT
AMBULATION: 0-->INDEPENDENT
RETIRED_COMMUNICATION: 0-->UNDERSTANDS/COMMUNICATES WITHOUT DIFFICULTY
CHANGE_IN_FUNCTIONAL_STATUS_SINCE_ONSET_OF_CURRENT_ILLNESS/INJURY: YES
TOILETING: 0-->INDEPENDENT
BATHING: 0-->INDEPENDENT
RETIRED_EATING: 0-->INDEPENDENT
DRESS: 1 - ASSISTIVE EQUIPMENT
COMMUNICATION: 0 - UNDERSTANDS/COMMUNICATES WITHOUT DIFFICULTY
AMBULATION: 1 - ASSISTIVE EQUIPMENT

## 2018-11-22 ASSESSMENT — ENCOUNTER SYMPTOMS
FLANK PAIN: 0
LIGHT-HEADEDNESS: 0
COUGH: 0
CHOKING: 0
EYE PAIN: 0
ADENOPATHY: 0
CHILLS: 0
POLYDIPSIA: 0
FEVER: 0
DYSURIA: 0
PHOTOPHOBIA: 0
POLYPHAGIA: 0
DIFFICULTY URINATING: 0

## 2018-11-22 NOTE — ED PROVIDER NOTES
"  History     Chief Complaint   Patient presents with     Extremity Weakness     Hyperglycemia     HPI  Homer Triana is a 58 year old male who is the emergency department with numbness and weakness of both of his legs.  He states to feel like rubber bands starting about 2 hours ago.  History of severe peripheral neuropathy and recurrent falls.  He has been doing physical therapy for this.  History of type 1 diabetes and recurrent DKA.  He has had type 1 diabetes for approximately 25 years.  He is on a pump.  No chest pain, abdominal pain or febrile chills and shakes.  Patient did not do his usual bolus this morning and has been having increased p.o. intake today due to the holiday.  Reviewed nurse's notes below, similar history is related to me.  Pt admit to [facility] via W/C, assisted from vehicle by staff.  Pt reports sudden onset of peripheral weakness that started about 2 hours ago, reports his legs \"feel like rubber bands\" and he feels shaky, denies having pain.  Pt has DM1, has peripheral neuropathy and a hx of falling due to neuropathy.  Pt is A&O x 4.       Problem List:    Patient Active Problem List    Diagnosis Date Noted     Uncontrolled type 2 DM with hyperosmolar nonketotic hyperglycemia (H) 10/06/2018     Priority: Medium     Syncope, unspecified syncope type 10/06/2018     Priority: Medium     Diabetic neuropathy (H) 10/06/2018     Priority: Medium     Back pain 10/06/2018     Priority: Medium     Generalized muscle weakness 10/06/2018     Priority: Medium     Essential hypertension 10/06/2018     Priority: Medium     Acute kidney injury (H) 10/06/2018     Priority: Medium     Uncontrolled type 2 diabetes mellitus with hyperosmolar nonketotic hyperglycemia (H) 10/06/2018     Priority: Medium        Past Medical History:    Past Medical History:   Diagnosis Date     Diabetes (H)      Diabetic peripheral neuropathy (H)      Hyperlipidemia      Hypertension      Hypothyroid        Past Surgical " "History:    Past Surgical History:   Procedure Laterality Date     CHOLECYSTECTOMY         Family History:    No family history on file.    Social History:  Marital Status:  Single [1]  Social History   Substance Use Topics     Smoking status: Former Smoker     Smokeless tobacco: Never Used     Alcohol use Yes      Comment: moderately        Medications:      Ascorbic Acid (VITAMIN C PO)   ASPIRIN PO   Cholecalciferol (VITAMIN D-3) 1000 units CAPS   cinnamon 500 MG CAPS   Flax Oil-Fish Oil-Borage Oil (FISH OIL-FLAX OIL-BORAGE OIL PO)   HYDROCHLOROTHIAZIDE PO   Insulin Aspart (NOVOLOG SC)   LEVOTHYROXINE SODIUM PO   LISINOPRIL PO   METOPROLOL TARTRATE PO   multivitamin, therapeutic with minerals (MULTI-VITAMIN) TABS tablet   ROSUVASTATIN CALCIUM PO         Review of Systems   Constitutional: Negative for chills and fever.   HENT: Negative for congestion.    Eyes: Negative for photophobia and pain.   Respiratory: Negative for cough and choking.    Endocrine: Negative for polydipsia, polyphagia and polyuria.   Genitourinary: Negative for difficulty urinating, dysuria and flank pain.   Neurological: Negative for light-headedness.   Hematological: Negative for adenopathy.       Physical Exam   BP: (!) 206/104  Heart Rate: 103  Temp: 97.8  F (36.6  C)  Resp: 18  Height: 177.8 cm (5' 10\")  Weight: 97.5 kg (215 lb)  SpO2: 96 %      Physical Exam   Constitutional: He is oriented to person, place, and time. He appears distressed.   HENT:   Mouth/Throat: No oropharyngeal exudate.   Neck: Normal range of motion. Neck supple.   Cardiovascular: Normal rate.    No murmur heard.  Pulmonary/Chest: Breath sounds normal. No respiratory distress. He has no wheezes. He has no rales.   Abdominal: Soft. Bowel sounds are normal. He exhibits no distension. There is no tenderness.   Musculoskeletal: He exhibits no edema or tenderness.   Neurological: He is alert and oriented to person, place, and time. He has normal reflexes. No cranial " nerve deficit. Coordination normal.   Skin: He is not diaphoretic.   Nursing note and vitals reviewed.      ED Course     ED Course     Procedures     EKG, sinus tachycardia and prominent T waves otherwise normal EKG  Results for orders placed or performed during the hospital encounter of 11/22/18 (from the past 24 hour(s))   CBC with platelets differential   Result Value Ref Range    WBC 8.5 4.0 - 11.0 10e9/L    RBC Count 4.48 4.4 - 5.9 10e12/L    Hemoglobin 14.2 13.3 - 17.7 g/dL    Hematocrit 41.5 40.0 - 53.0 %    MCV 93 78 - 100 fl    MCH 31.7 26.5 - 33.0 pg    MCHC 34.2 31.5 - 36.5 g/dL    RDW 11.2 10.0 - 15.0 %    Platelet Count 208 150 - 450 10e9/L    Diff Method Automated Method     % Neutrophils 80.7 %    % Lymphocytes 9.6 %    % Monocytes 7.8 %    % Eosinophils 0.9 %    % Basophils 0.6 %    % Immature Granulocytes 0.4 %    Absolute Neutrophil 6.8 1.6 - 8.3 10e9/L    Absolute Lymphocytes 0.8 0.8 - 5.3 10e9/L    Absolute Monocytes 0.7 0.0 - 1.3 10e9/L    Absolute Eosinophils 0.1 0.0 - 0.7 10e9/L    Absolute Basophils 0.1 0.0 - 0.2 10e9/L    Abs Immature Granulocytes 0.0 0 - 0.4 10e9/L   Comprehensive metabolic panel   Result Value Ref Range    Sodium 123 (L) 134 - 144 mmol/L    Potassium 6.7 (H) 3.5 - 5.1 mmol/L    Chloride 90 (L) 98 - 107 mmol/L    Carbon Dioxide 22 21 - 31 mmol/L    Anion Gap 11 3 - 14 mmol/L    Glucose 796 (HH) 70 - 105 mg/dL    Urea Nitrogen 51 (H) 7 - 25 mg/dL    Creatinine 1.87 (H) 0.70 - 1.30 mg/dL    GFR Estimate 38 (L) >60 mL/min/1.7m2    GFR Estimate If Black 45 (L) >60 mL/min/1.7m2    Calcium 9.1 8.6 - 10.3 mg/dL    Bilirubin Total 1.1 (H) 0.3 - 1.0 mg/dL    Albumin 4.4 3.5 - 5.7 g/dL    Protein Total 6.9 6.4 - 8.9 g/dL    Alkaline Phosphatase 100 34 - 104 U/L    ALT 39 7 - 52 U/L    AST 33 13 - 39 U/L   INR   Result Value Ref Range    INR 0.83 0 - 1.3   *UA reflex to Microscopic   Result Value Ref Range    Color Urine Yellow     Appearance Urine Clear     Glucose Urine >1000 (A)  NEG^Negative mg/dL    Bilirubin Urine Negative NEG^Negative    Ketones Urine Trace (A) NEG^Negative mg/dL    Specific Gravity Urine 1.010 1.000 - 1.030    Blood Urine Trace (A) NEG^Negative    pH Urine 5.0 5.0 - 9.0 pH    Protein Albumin Urine Negative NEG^Negative mg/dL    Urobilinogen Urine 0.2 0.2 - 1.0 EU/dL    Nitrite Urine Negative NEG^Negative    Leukocyte Esterase Urine Negative NEG^Negative    Source Midstream Urine    Urine Microscopic   Result Value Ref Range    WBC Urine 0 - 5 OTO5^0 - 5 /HPF    RBC Urine 2-5 (A) OTO2^O - 2 /HPF   Ketone Beta-Hydroxybutyrate Quantitative   Result Value Ref Range    Ketone Quantitative 1.8 (HH) 0.0 - 0.6 mmol/L       Medications   0.9% sodium chloride BOLUS (0 mLs Intravenous Stopped 11/22/18 1714)     Followed by   sodium chloride 0.9% infusion (1,000 mLs Intravenous New Bag 11/22/18 1714)   insulin 1 unit/1 mL in NS (NovoLIN, HumuLIN Regular) drip -ED DKA algorithm (5.5 Units/hr Intravenous New Bag 11/22/18 1644)   calcium gluconate 1 g in D5W 100 mL intermittent infusion (1 g Intravenous New Bag 11/22/18 1645)   glucose gel 15-30 g (not administered)     Or   dextrose 50 % injection 25-50 mL (not administered)     Or   glucagon injection 1 mg (not administered)       Assessments & Plan (with Medical Decision Making)     Patient feeling better over the course of his ER stay just with IV fluids and insulin.  Does appear that he is in DKA.  I discussed with Dr. Gasca.  Will to admit to the ICU on DKA protocol.  Patient family aware.  With the patient's permission I did discuss it with his niece's  who is a transplant surgeon at the [facility].  We did discuss his medical care.    New Prescriptions    No medications on file       Final diagnoses:   DKA, type 1, not at goal (H)       11/22/2018   [facility] AND Jacob Guardado MD  11/22/18 0907

## 2018-11-22 NOTE — ED TRIAGE NOTES
"Pt admit to [facility] via W/C, assisted from vehicle by staff.  Pt reports sudden onset of peripheral weakness that started about 2 hours ago, reports his legs \"feel like rubber bands\" and he feels shaky, denies having pain.  Pt has DM1, has peripheral neuropathy and a hx of falling due to neuropathy.  Pt is A&O x 4.      [place]-SUICIDE SEVERITY RATING SCALE   Screen with Triage Points for Emergency Department      Ask questions that are bolded and underlined.   Past  month   Ask Questions 1 and 2 YES NO   1)  Have you wished you were dead or wished you could go to sleep and not wake up?   x   2)  Have you actually had any thoughts of killing yourself?   x   If YES to 2, ask questions 3, 4, 5, and 6.  If NO to 2, go directly to question 6.   3)  Have you been thinking about how you might do this?   E.g.  I thought about taking an overdose but I never made a specific plan as to when where or how I would actually do it .and I would never go through with it.       4)  Have you had these thoughts and had some intention of acting on them?   As opposed to  I have the thoughts but I definitely will not do anything about them.       5)  Have you started to work out or worked out the details of how to kill yourself? Do you intend to carry out this plan?      6)  Have you ever done anything, started to do anything, or prepared to do anything to end your life?  Examples: Collected pills, obtained a gun, gave away valuables, wrote a will or suicide note, took out pills but didn t swallow any, held a gun but changed your mind or it was grabbed from your hand, went to the roof but didn t jump; or actually took pills, tried to shoot yourself, cut yourself, tried to hang yourself, etc.    If YES, ask: Was this within the past three months?  Lifetime     x    Past 3 Months        Item 1:  Behavioral Health Referral at Discharge  Item 2:  Behavioral Health Referral at Discharge   Item 3:  Behavioral Health Consult (Psychiatric " Nurse/) and consider Patient Safety Precautions  Item 4:  Immediate Notification of Physician and/or Behavioral Health and Patient Safety Precautions   Item 5:  Immediate Notification of Physician and/or Behavioral Health and Patient Safety Precautions  Item 6:  Over 3 months ago: Behavioral Health Consult (Psychiatric Nurse/) and consider Patient Safety Precautions  OR  Item 6:  3 months ago or less: Immediate Notification of Physician and/or Behavioral Health and Patient Safety Precautions

## 2018-11-22 NOTE — H&P
[facility]    History and Physical  Hospitalist       Date of Admission:  11/22/2018  Date of Service (when I saw the patient): 11/22/18    Assessment & Plan   Homer Triana is a 58 year old male who presents with DKA on 11/22/2018.    DKA- Stop insulin pump (which is working). Due to dietary non-compliance and missed BS check so no post-prandial bolus. DKA protocol. Fluids, lytes, NPO with ice chips for tonight. ICU admit. 3rd episode in the last few months. Counseled on diabetic diet. He says his endocrinologist wanted to change his pump settings.     DM- Poorly controlled. A1C>11 in October 2018.    Hyperkalemia- Despite overall K reduction. Follow BMPs. EKG with peaked T waves. Given Ca gluconate. Follow on Tele.     Diabetic neuropathy.    CKD 3 due to diabetic neuropathy    FRANCES- Due to prerenal azotemia. IVFs and follow BMPs.     HTN- Elevated BP PRN hydralazine.     Active Problems:    * No active hospital problems. *    DVT Prophylaxis: Pneumatic Compression Devices and Anti-embolisim stockings (TEDs)  Code Status: Full Code    Disposition: Expected discharge in 1-2 days once out of DKA, metabolic anomalies corrected and well hydrated.     Debra Rodriguezhar    Primary Care Physician   Russ Stockton    Chief Complaint   B LE weakness with elevated B    History is obtained from the patient, electronic health record and emergency department physician    History of Present Illness   Homer Triana is a 58 year old male who presents with h/o IDDM x >25 years. On an insulin pump that's managed by his endocrinologist (next visit 2 months). The patient had a large meal at a restaurant for dinner and had 2 big alcoholic drinks, but forgot to check his BS. In the AM, felt weakness of LEs, checked his BS which was >500 and he didn't bolus himself and came to the ED where he was found to be in DKA. Given IV insulin and fluid bolus. His K was 6.7 and given Ca gluconate. He feels better now.  LE weakness much better. Never had nausea or abdominal pain.     Past Medical History    I have reviewed this patient's medical history and updated it with pertinent information if needed.   Past Medical History:   Diagnosis Date     Diabetes (H)     type 1   per patient report.  Diagnosed at age 32 something. currently on insulin pump     Diabetic peripheral neuropathy (H)      Hyperlipidemia      Hypertension      Hypothyroid        Past Surgical History   I have reviewed this patient's surgical history and updated it with pertinent information if needed.  Past Surgical History:   Procedure Laterality Date     CHOLECYSTECTOMY         Prior to Admission Medications   Prior to Admission Medications   Prescriptions Last Dose Informant Patient Reported? Taking?   ASPIRIN PO 11/21/2018 at pm  Yes Yes   Sig: Take 81 mg by mouth daily   Ascorbic Acid (VITAMIN C PO) 11/21/2018 at pm  Yes Yes   Sig: Take 500 mg by mouth daily   Cholecalciferol (VITAMIN D-3) 1000 units CAPS 11/21/2018 at pm  Yes Yes   Sig: Take 2 capsules by mouth daily   Flax Oil-Fish Oil-Borage Oil (FISH OIL-FLAX OIL-BORAGE OIL PO) 11/21/2018 at pm  Yes Yes   Sig: Take 1 capsule by mouth daily   HYDROCHLOROTHIAZIDE PO 11/21/2018 at pm  Yes Yes   Sig: Take 25 mg by mouth daily   Insulin Aspart (NOVOLOG SC) 11/22/2018 at Unknown time  Yes Yes   Sig: Insulin pump   LEVOTHYROXINE SODIUM PO 11/21/2018 at pm  Yes Yes   Sig: Take 50 mcg by mouth daily   LISINOPRIL PO 11/21/2018 at pm  Yes Yes   Sig: Take 40 mg by mouth daily   METOPROLOL TARTRATE PO 11/21/2018 at pm  Yes Yes   Sig: Take 50 mg by mouth 2 times daily   ROSUVASTATIN CALCIUM PO 11/21/2018 at pm  Yes Yes   Sig: Take 10 mg by mouth daily   cinnamon 500 MG CAPS 11/21/2018 at pm  Yes Yes   Sig: Take 1 capsule by mouth daily   multivitamin, therapeutic with minerals (MULTI-VITAMIN) TABS tablet 11/21/2018 at pm  Yes Yes   Sig: Take 1 tablet by mouth daily      Facility-Administered Medications: None      Allergies   No Known Allergies    Social History   I have reviewed this patient's social history and updated it with pertinent information if needed. Homer Triana  reports that he has quit smoking. He has never used smokeless tobacco. He reports that he drinks alcohol. He reports that he does not use illicit drugs.    Family History   I have reviewed this patient's family history and updated it with pertinent information if needed.   No family history on file.    Review of Systems   The 10 point Review of Systems is negative other than noted in the HPI or here.     Physical Exam   Temp: 97.8  F (36.6  C) Temp src: Tympanic BP: (!) 185/114   Heart Rate: 95 Resp: 14 SpO2: 98 % O2 Device: None (Room air)    Vital Signs with Ranges  Temp:  [97.8  F (36.6  C)] 97.8  F (36.6  C)  Heart Rate:  [] 95  Resp:  [7-24] 14  BP: (176-206)/() 185/114  SpO2:  [96 %-99 %] 98 %  215 lbs 0 oz    Constitutional: In NAD  Eyes: Unremarkable  HEENT: Dry MM  Respiratory: CTA B  Cardiovascular: S1 + S2  GI: Soft, NT, ND  Skin: No acute rashes or lesions  Musculoskeletal: No pedal edema  Neurologic: Grossly non-focal. Symmetric.   Psychiatric: Alert and oriented x 3. Normal affect.     Data   Data reviewed today:  I personally reviewed the EKG tracing showing NSR with mild peaking of T waves. intervals normal.    Recent Labs  Lab 11/22/18  1537   WBC 8.5   HGB 14.2   MCV 93      INR 0.83   *   POTASSIUM 6.7*   CHLORIDE 90*   CO2 22   BUN 51*   CR 1.87*   ANIONGAP 11   DESMOND 9.1   *   ALBUMIN 4.4   PROTTOTAL 6.9   BILITOTAL 1.1*   ALKPHOS 100   ALT 39   AST 33       Lactic Acid   Date Value Ref Range Status   10/06/2018 2.3 (H) 0.4 - 2.0 mmol/L Final     Comment:     Significant value called to and read back by  Aisha Jain at 1740 on 10/6/18 by NMJ         No results found for this or any previous visit (from the past 24 hour(s)).

## 2018-11-22 NOTE — IP AVS SNAPSHOT
MRN:[id number]                      After Visit Summary   11/22/2018    Homer Triana    MRN: [id number]           Thank you!     Thank you for choosing [facility] for your care. Our goal is always to provide you with excellent care. Hearing back from our patients is one way we can continue to improve our services. Please take a few minutes to complete the written survey that you may receive in the mail after you visit with us. Thank you!        Patient Information     Date Of Birth          1960        Designated Caregiver       Most Recent Value    Caregiver    Will someone help with your care after discharge? no      About your hospital stay     You were admitted on:  November 22, 2018 You last received care in the:  [facility]    You were discharged on:  November 23, 2018        Reason for your hospital stay       DKA                  Who to Call     For medical emergencies, please call 911.  For non-urgent questions about your medical care, please call your primary care provider or clinic, [phone]          Attending Provider     Provider Specialty    Jacob Garcia MD [facility] Internal [facility]       Primary Care Provider Office Phone # Fax #    Russ Stockton [phone]      After Care Instructions     Activity       Your activity upon discharge: activity as tolerated            Diet       Follow this diet upon discharge: Orders Placed This Encounter      Consistent Carbohydrate Diet 7408-4220 Calories: Moderate Consistent CHO (4-6 CHO units/meal)                  Follow-up Appointments     Follow-up and recommended labs and tests        Follow up with primary care provider, Russ Stockton, within 7 days for hospital follow- up.  The following labs/tests are recommended: HbA1C and BS follow up.  F/U with your endocrinologist.   F/U with your podiatrist.                  Your next 10 appointments already  scheduled     Nov 28, 2018 12:20 PM CST   (Arrive by 12:05 PM)   [facility] ABBIE Sam CNP   [facility]    [address] [place] [address]   [phone]           Bring a current list of meds and any records pertaining to this visit. For Physicals, please bring immunization records and any forms needing to be filled out. Please arrive 10 minutes early to complete paperwork.              Further instructions from your care team       Ask your doctor to consider a sleep study.  You were observed to have pauses in your breathing and drop in your oxygen levels while you were sleeping.  This could help determine whether or not you have sleep apnea.    Ask you doctor if sequential compression devices are appropriate for your neuropathy.  You stated they helped with your neuropathy.    Ask your doctor to consider metoprolol succinate since you are only taking the metoprolol tartrate once a day.      You need to see the diabetic educator at [facility] in [place] regularly.  We have scheduled an appointment with Priti Swan on 11/28/18 at 12:00 noon.  Please establish care and see the diabetic educator regularly.  This is CRITICAL to prevent future hospitalizations or medical emergencies.    Pending Results     Date and Time Order Name Status Description    11/22/2018 1809 Osmolality In process     11/22/2018 1529 EKG 12-lead, tracing only In process             Statement of Approval     Ordered          11/23/18 1010  I have reviewed and agree with all the recommendations and orders detailed in this document.  EFFECTIVE NOW     Approved and electronically signed by:  Debra Cunha MD             Admission Information     Date & Time Provider Department Dept. Phone    11/22/2018 Debra Cunha MD [facility] [phone]      Your Vitals Were     Blood Pressure Temperature Respirations Height Weight Pulse  "Oximetry    117/63 97.5  F (36.4  C) (Temporal) 18 1.778 m (5' 10\") 100.1 kg (220 lb 10.9 oz) 96%    BMI (Body Mass Index)                   31.66 kg/m2           [facility] Information     [facility] lets you send messages to your doctor, view your test results, renew your prescriptions, schedule appointments and more. To sign up, go to [email or web address] . Click on \"Log in\" on the left side of the screen, which will take you to the Welcome page. Then click on \"Sign up Now\" on the right side of the page.     You will be asked to enter the access code listed below, as well as some personal information. Please follow the directions to create your username and password.     Your access code is: [email or web address]  Expires: 2019 10:24 AM     Your access code will  in 90 days. If you need help or a new code, please call your [facility] clinic or [phone].        Care EveryWhere ID     This is your Care EveryWhere ID. This could be used by other organizations to access your [facility] medical records  [id number]        Equal Access to Services     EZEQUIEL CHAIDEZ AH: Michele Landaverde, wajodi zelaya, qajosseta kaalmada usha, katelin dee. So [facility] [phone].    ATENCIÓN: Si habla español, tiene a berry disposición servicios gratuitos de asistencia lingüística. Llame al [phone].    We comply with applicable federal civil rights laws and [place] laws. We do not discriminate on the basis of race, color, national origin, age, disability, sex, sexual orientation, or gender identity.               Review of your medicines      UNREVIEWED medicines. Ask your doctor about these medicines        Dose / Directions    ascorbic acid 500 MG tablet   Commonly known as:  VITAMIN C        Dose:  500 mg   Take 500 mg by mouth daily   Refills:  0       aspirin 81 MG chewable tablet   Commonly known as:  ASA        Dose:  81 mg   Take 81 mg by mouth daily   Refills:  0       cinnamon 500 " MG Tabs   This may have changed:  Another medication with the same name was removed. Continue taking this medication, and follow the directions you see here.   Ask about: Which instructions should I use?        Dose:  1 tablet   Take 1 tablet by mouth daily   Refills:  0       FLAX OIL-FISH OIL-BORAGE OIL PO   This may have changed:  Another medication with the same name was removed. Continue taking this medication, and follow the directions you see here.   Ask about: Which instructions should I use?        Dose:  1 capsule   Take 1 capsule by mouth daily   Refills:  0       hydrochlorothiazide 25 MG tablet   Commonly known as:  HYDRODIURIL   This may have changed:  Another medication with the same name was removed. Continue taking this medication, and follow the directions you see here.   Ask about: Which instructions should I use?        Dose:  25 mg   Take 25 mg by mouth daily   Refills:  0       levothyroxine 50 MCG tablet   Commonly known as:  SYNTHROID/LEVOTHROID   This may have changed:  Another medication with the same name was removed. Continue taking this medication, and follow the directions you see here.   Ask about: Which instructions should I use?        Dose:  50 mcg   Take 50 mcg by mouth daily   Refills:  0       lisinopril 40 MG tablet   Commonly known as:  PRINIVIL/ZESTRIL   This may have changed:  Another medication with the same name was removed. Continue taking this medication, and follow the directions you see here.   Ask about: Which instructions should I use?        Dose:  40 mg   Take 40 mg by mouth daily   Refills:  0       metoprolol tartrate 50 MG tablet   Commonly known as:  LOPRESSOR   This may have changed:  Another medication with the same name was removed. Continue taking this medication, and follow the directions you see here.   Ask about: Which instructions should I use?        Dose:  50 mg   Take 50 mg by mouth daily (Ordered twice daily per MD, patient takes once daily, per  patient MD joe'juan this, Pharmacy recommends succinate if only daily)   Refills:  0       rosuvastatin 10 MG tablet   Commonly known as:  CRESTOR   This may have changed:  Another medication with the same name was removed. Continue taking this medication, and follow the directions you see here.   Ask about: Which instructions should I use?        Dose:  10 mg   Take 10 mg by mouth daily   Refills:  0       vitamin D3 2000 units tablet   Commonly known as:  CHOLECALCIFEROL   This may have changed:  Another medication with the same name was removed. Continue taking this medication, and follow the directions you see here.   Ask about: Which instructions should I use?        Dose:  1 tablet   Take 1 tablet by mouth daily   Refills:  0         CONTINUE these medicines which have NOT CHANGED        Dose / Directions    insulin pump infusion        Refills:  0       Multi-vitamin Tabs tablet        Dose:  1 tablet   Take 1 tablet by mouth daily   Refills:  0       NOVOLOG SC        Insulin pump   Refills:  0         STOP taking     ASPIRIN PO           VITAMIN C PO                    Protect others around you: Learn how to safely use, store and throw away your medicines at www.disposemymeds.org.             Medication List: This is a list of all your medications and when to take them. Check marks below indicate your daily home schedule. Keep this list as a reference.      Medications           Morning Afternoon Evening Bedtime As Needed    ascorbic acid 500 MG tablet   Commonly known as:  VITAMIN C   Take 500 mg by mouth daily                                aspirin 81 MG chewable tablet   Commonly known as:  ASA   Take 81 mg by mouth daily                                insulin pump infusion                                Multi-vitamin Tabs tablet   Take 1 tablet by mouth daily                                NOVOLOG SC   Insulin pump                                  ASK your doctor about these medications           Morning  Afternoon Evening Bedtime As Needed    cinnamon 500 MG Tabs   Take 1 tablet by mouth daily   Ask about: Which instructions should I use?                                FLAX OIL-FISH OIL-BORAGE OIL PO   Take 1 capsule by mouth daily   Ask about: Which instructions should I use?                                hydrochlorothiazide 25 MG tablet   Commonly known as:  HYDRODIURIL   Take 25 mg by mouth daily   Ask about: Which instructions should I use?                                levothyroxine 50 MCG tablet   Commonly known as:  SYNTHROID/LEVOTHROID   Take 50 mcg by mouth daily   Ask about: Which instructions should I use?                                lisinopril 40 MG tablet   Commonly known as:  PRINIVIL/ZESTRIL   Take 40 mg by mouth daily   Ask about: Which instructions should I use?                                metoprolol tartrate 50 MG tablet   Commonly known as:  LOPRESSOR   Take 50 mg by mouth daily (Ordered twice daily per MD, patient takes once daily, per patient MD negrete this, Pharmacy recommends succinate if only daily)   Ask about: Which instructions should I use?                                rosuvastatin 10 MG tablet   Commonly known as:  CRESTOR   Take 10 mg by mouth daily   Ask about: Which instructions should I use?                                vitamin D3 2000 units tablet   Commonly known as:  CHOLECALCIFEROL   Take 1 tablet by mouth daily   Ask about: Which instructions should I use?

## 2018-11-22 NOTE — IP AVS SNAPSHOT
[facility]    [address]    [place] [address]    Phone:  [phone]    Fax:  [phone]                                       After Visit Summary   11/22/2018    Homer Triana    MRN: [id number]           After Visit Summary Signature Page     I have received my discharge instructions, and my questions have been answered. I have discussed any challenges I see with this plan with the nurse or doctor.    ..........................................................................................................................................  Patient/Patient Representative Signature      ..........................................................................................................................................  Patient Representative Print Name and Relationship to Patient    ..................................................               ................................................  Date                                   Time    ..........................................................................................................................................  Reviewed by Signature/Title    ...................................................              ..............................................  Date                                               Time          22EPIC Rev 08/18

## 2018-11-23 ENCOUNTER — TELEPHONE (OUTPATIENT)
Dept: EDUCATION SERVICES | Facility: OTHER | Age: 58
End: 2018-11-23

## 2018-11-23 VITALS
BODY MASS INDEX: 31.59 KG/M2 | WEIGHT: 220.68 LBS | TEMPERATURE: 97.5 F | SYSTOLIC BLOOD PRESSURE: 124 MMHG | RESPIRATION RATE: 18 BRPM | HEIGHT: 70 IN | DIASTOLIC BLOOD PRESSURE: 65 MMHG | OXYGEN SATURATION: 96 %

## 2018-11-23 LAB
ANION GAP SERPL CALCULATED.3IONS-SCNC: 5 MMOL/L (ref 3–14)
ANION GAP SERPL CALCULATED.3IONS-SCNC: 7 MMOL/L (ref 3–14)
BASOPHILS # BLD AUTO: 0 10E9/L (ref 0–0.2)
BASOPHILS NFR BLD AUTO: 0.5 %
BUN SERPL-MCNC: 38 MG/DL (ref 7–25)
BUN SERPL-MCNC: 40 MG/DL (ref 7–25)
CALCIUM SERPL-MCNC: 8.6 MG/DL (ref 8.6–10.3)
CALCIUM SERPL-MCNC: 8.9 MG/DL (ref 8.6–10.3)
CHLORIDE SERPL-SCNC: 105 MMOL/L (ref 98–107)
CHLORIDE SERPL-SCNC: 107 MMOL/L (ref 98–107)
CO2 SERPL-SCNC: 26 MMOL/L (ref 21–31)
CO2 SERPL-SCNC: 27 MMOL/L (ref 21–31)
CREAT SERPL-MCNC: 1.38 MG/DL (ref 0.7–1.3)
CREAT SERPL-MCNC: 1.49 MG/DL (ref 0.7–1.3)
DIFFERENTIAL METHOD BLD: ABNORMAL
EOSINOPHIL # BLD AUTO: 0.2 10E9/L (ref 0–0.7)
EOSINOPHIL NFR BLD AUTO: 3.3 %
ERYTHROCYTE [DISTWIDTH] IN BLOOD BY AUTOMATED COUNT: 11.7 % (ref 10–15)
GFR SERPL CREATININE-BSD FRML MDRD: 49 ML/MIN/1.7M2
GFR SERPL CREATININE-BSD FRML MDRD: 53 ML/MIN/1.7M2
GLUCOSE SERPL-MCNC: 123 MG/DL (ref 70–105)
GLUCOSE SERPL-MCNC: 123 MG/DL (ref 70–105)
HCT VFR BLD AUTO: 35.3 % (ref 40–53)
HGB BLD-MCNC: 12.1 G/DL (ref 13.3–17.7)
IMM GRANULOCYTES # BLD: 0 10E9/L (ref 0–0.4)
IMM GRANULOCYTES NFR BLD: 0.2 %
KETONES BLD-SCNC: 1.3 MMOL/L (ref 0–0.6)
LYMPHOCYTES # BLD AUTO: 2 10E9/L (ref 0.8–5.3)
LYMPHOCYTES NFR BLD AUTO: 33.3 %
MCH RBC QN AUTO: 31.3 PG (ref 26.5–33)
MCHC RBC AUTO-ENTMCNC: 34.3 G/DL (ref 31.5–36.5)
MCV RBC AUTO: 92 FL (ref 78–100)
MONOCYTES # BLD AUTO: 0.6 10E9/L (ref 0–1.3)
MONOCYTES NFR BLD AUTO: 10.4 %
NEUTROPHILS # BLD AUTO: 3.2 10E9/L (ref 1.6–8.3)
NEUTROPHILS NFR BLD AUTO: 52.3 %
OSMOLALITY SERPL: 326 MMOL/KG (ref 275–295)
PHOSPHATE SERPL-MCNC: 3.7 MG/DL (ref 2.5–5)
PLATELET # BLD AUTO: 207 10E9/L (ref 150–450)
POTASSIUM SERPL-SCNC: 4.8 MMOL/L (ref 3.5–5.1)
POTASSIUM SERPL-SCNC: 5 MMOL/L (ref 3.5–5.1)
RBC # BLD AUTO: 3.86 10E12/L (ref 4.4–5.9)
SODIUM SERPL-SCNC: 137 MMOL/L (ref 134–144)
SODIUM SERPL-SCNC: 140 MMOL/L (ref 134–144)
WBC # BLD AUTO: 6.1 10E9/L (ref 4–11)

## 2018-11-23 PROCEDURE — 82010 KETONE BODYS QUAN: CPT | Performed by: INTERNAL MEDICINE

## 2018-11-23 PROCEDURE — 36415 COLL VENOUS BLD VENIPUNCTURE: CPT | Performed by: INTERNAL MEDICINE

## 2018-11-23 PROCEDURE — 85025 COMPLETE CBC W/AUTO DIFF WBC: CPT | Performed by: INTERNAL MEDICINE

## 2018-11-23 PROCEDURE — 84100 ASSAY OF PHOSPHORUS: CPT | Performed by: INTERNAL MEDICINE

## 2018-11-23 PROCEDURE — 25000128 H RX IP 250 OP 636: Performed by: INTERNAL MEDICINE

## 2018-11-23 PROCEDURE — 25000128 H RX IP 250 OP 636: Performed by: FAMILY MEDICINE

## 2018-11-23 PROCEDURE — 99239 HOSP IP/OBS DSCHRG MGMT >30: CPT | Performed by: INTERNAL MEDICINE

## 2018-11-23 PROCEDURE — 80048 BASIC METABOLIC PNL TOTAL CA: CPT | Performed by: INTERNAL MEDICINE

## 2018-11-23 RX ORDER — LISINOPRIL 40 MG/1
40 TABLET ORAL EVERY EVENING
COMMUNITY

## 2018-11-23 RX ORDER — ASPIRIN 81 MG/1
81 TABLET, CHEWABLE ORAL DAILY
COMMUNITY
End: 2021-09-16

## 2018-11-23 RX ORDER — CHOLECALCIFEROL (VITAMIN D3) 50 MCG
1 TABLET ORAL DAILY
Status: ON HOLD | COMMUNITY
End: 2021-09-16

## 2018-11-23 RX ORDER — ASCORBIC ACID 500 MG
500 TABLET ORAL DAILY
Status: ON HOLD | COMMUNITY
End: 2021-09-16

## 2018-11-23 RX ORDER — METOPROLOL TARTRATE 50 MG
50 TABLET ORAL 2 TIMES DAILY
COMMUNITY

## 2018-11-23 RX ORDER — DEXTROSE MONOHYDRATE 25 G/50ML
25-50 INJECTION, SOLUTION INTRAVENOUS
Status: DISCONTINUED | OUTPATIENT
Start: 2018-11-23 | End: 2018-11-23

## 2018-11-23 RX ORDER — NICOTINE POLACRILEX 4 MG
15-30 LOZENGE BUCCAL
Status: DISCONTINUED | OUTPATIENT
Start: 2018-11-23 | End: 2018-11-23

## 2018-11-23 RX ORDER — LEVOTHYROXINE SODIUM 137 UG/1
137 TABLET ORAL EVERY EVENING
Status: ON HOLD | COMMUNITY
End: 2024-07-05

## 2018-11-23 RX ORDER — HYDROCHLOROTHIAZIDE 25 MG/1
25 TABLET ORAL EVERY EVENING
COMMUNITY

## 2018-11-23 RX ORDER — ROSUVASTATIN CALCIUM 10 MG/1
10 TABLET, COATED ORAL EVERY EVENING
COMMUNITY

## 2018-11-23 RX ADMIN — HYDRALAZINE HYDROCHLORIDE 10 MG: 20 INJECTION INTRAMUSCULAR; INTRAVENOUS at 08:38

## 2018-11-23 RX ADMIN — SODIUM CHLORIDE 1000 ML: 900 INJECTION, SOLUTION INTRAVENOUS at 02:41

## 2018-11-23 ASSESSMENT — ACTIVITIES OF DAILY LIVING (ADL)
ADLS_ACUITY_SCORE: 11

## 2018-11-23 NOTE — PHARMACY-ADMISSION MEDICATION HISTORY
Pharmacy -- Admission Medication Reconciliation IN PROGRESS    Prior to admission (PTA) medications were reviewed and the patient's PTA medication list was updated.    Sources Consulted: Dr. Stockton's office (forwarded me to Dr. Jackson);  Dr. Jackson's office (no records, no show), Left message for diabetes educator at [facility] [phone]. Unsure if they are in for the weekend    [facility] pharmacy is closed today and for the weekend    In progress    Phuong Sanderson [facility], 11/23/2018,  10:20 AM

## 2018-11-23 NOTE — PHARMACY-ADMISSION MEDICATION HISTORY
Pharmacy -- Admission Medication Reconciliation    Prior to admission (PTA) medications were reviewed and the patient's PTA medication list was updated.    Sources Consulted: Dr. Stockton's office [phone] (forwarded me to Dr. Jackson);  Dr. Jackson's office at [facility] (no records, no show), Diabetes educator office at [facility] [phone]--stated he has never been seen there either.   Met with patient     [facility] pharmacy is closed today and for the weekend    The reliability of this Medication Reconciliation is: Reliability: Unreliable    The following significant changes were made:  Added times of last doses  Changed metoprolol tartrate to daily. Patient states he only takes medications once a day and his doctor is aware. Pharmacy suggested if it is only daily to ask Dr. Stockton to change to succinate.    [place] notes list insulin at 1 unit per hour to 1.5 unit per hour; per patient is between 1.5 and 1.75 units per hour plus boluses. Patient states his boluses are between 8 and 15 units as needed. Patient describe multiple incidents where he did not check his sugars and/or use boluses.    In addition, the patient's allergies were reviewed with the patient and left as follows:   Allergies: Review of patient's allergies indicates no known allergies.       Medication barriers identified: no diabetes monitoring found. Dr. Stockton's office said Dr. Jackson's office was doing it. Dr. Jackson's office said Dr. Stockton's office is doing it. Then, Dr. Stockton's office said diabetes education at [facility] is doing it. Diabetes educator has never seen patient. Diabetes Education office shared he has been in and out of ED multiple times since October.   Medication adherence concerns: yes   Understanding of emergency medications: he does not have glucagon, he does not have glucose tablets, he states he uses orange juice    Phuong Sanderson [facility], 11/23/2018,  10:47 AM     Reentered all medications due to PO  selection  Phuong Sanderson [facility] on 11/23/2018 at 11:24 AM

## 2018-11-23 NOTE — DISCHARGE INSTRUCTIONS
Ask your doctor to consider a sleep study.  You were observed to have pauses in your breathing and drop in your oxygen levels while you were sleeping.  This could help determine whether or not you have sleep apnea.    Ask you doctor if sequential compression devices are appropriate for your neuropathy.  You stated they helped with your neuropathy.    Ask your doctor to consider metoprolol succinate since you are only taking the metoprolol tartrate once a day.      You need to see the diabetic educator at [facility] in [place] regularly.  We have scheduled an appointment with Priti Swan on 11/28/18 at 12:00 noon.  Please establish care and see the diabetic educator regularly.  This is CRITICAL to prevent future hospitalizations or medical emergencies.

## 2018-11-23 NOTE — PHARMACY - DISCHARGE MEDICATION RECONCILIATION AND EDUCATION
Pharmacy:  Discharge Counseling and Medication Reconciliation    Homer Triana  [address] [place] [address]  [phone] (home)   58 year old male  PCP: Russ Stockton    Allergies: Review of patient's allergies indicates no known allergies.    Discharge Medication Reconciliation:    Phuong Sanderson [facility] has reviewed the patient's discharge medication orders and has compared them to the inpatient medication administration record and to what the patient was taking prior to admission - any discrepancies have been resolved.    It has been determined that the patient has an adequate supply of medications available or which can be obtained from the patient's preferred pharmacy, which he confirmed as: [facility] from Dr. Stockton. Notified nursing and patient about concern with metoprolol tartrate once daily, pharmacy suggests succinate.    Pharmacy suggests a diabetes education appointment prior to discharge due to never being seen per Dr. Mahin [place]'s office and Dr. Jackson's office.    Thank you for the consult.    Phuong Sanderson [facility]........November 23, 2018 12:13 PM

## 2018-11-23 NOTE — PLAN OF CARE
Problem: Hyperglycemia, Persistent (Adult)  Intervention: Optimize Glycemic Control  Q 1 hour glucose have been between 150-220.  He ate his breakfast without nausea/vomiting.  Alert and oriented.  He is capable of using his insulin pump independently, bolusing per carb choice at meal times.          
Fall with Harm Risk

## 2018-11-23 NOTE — PROGRESS NOTES
Patient has observed sleep apnea, SpO2 to 79% at times while asleep.  No history of this in his medical record.

## 2018-11-23 NOTE — PROGRESS NOTES
Received report from [facility] and patient transferred from ER to ICu per stretcher. Alert and orientated. Starting to feel a bit better. Anxious about situation.

## 2018-11-23 NOTE — DISCHARGE SUMMARY
[facility]    Discharge Summary  Hospitalist    Date of Admission:  11/22/2018  Date of Discharge:  11/23/2018  Discharging Provider: Debra Cunha  Date of Service (when I saw the patient): 11/23/18    Discharge Diagnoses   DKA    History of Present Illness   Homer Triana is an 58 year old male who presented with LE weakness    Pls see H&P for details.       Hospital Course   Homer Triana was admitted on 11/22/2018.  The following problems were addressed during his hospitalization:    DKA- Resolved. Resumed insulin pump and boluses. Follow with PCP and endocrinologist. Last A1C >11    L diabetic foot ulcer to left lateral malleolus-Not infected. PCP and podiatrist to follow          Diabetic NP    Hyperkalemia- Resolved    CKD 3 - Cr back at basline    FRANCES- Resolved    HTN- Outpt regimen    Stable and back to baseline at discharge.     Active Problems:    DKA (diabetic ketoacidosis) (H)      Debra Cunha    Code Status   Full Code       Primary Care Physician   Russ Stockton        Discharge Disposition   Discharged to home  Condition at discharge: Stable    Consultations This Hospital Stay   None    Time Spent on this Encounter   I, Debra Cunha, personally saw the patient today and spent less than or equal to 30 minutes discharging this patient.    Discharge Orders     Reason for your hospital stay   DKA     Follow-up and recommended labs and tests    Follow up with primary care provider, Russ Stockton, within 7 days for hospital follow- up.  The following labs/tests are recommended: HbA1C and BS follow up.  F/U with your endocrinologist.   F/U with your podiatrist.     Activity   Your activity upon discharge: activity as tolerated     Diet   Follow this diet upon discharge: Orders Placed This Encounter     Consistent Carbohydrate Diet 3118-1164 Calories: Moderate Consistent CHO (4-6 CHO units/meal)       Discharge Medications   Current Discharge Medication List       CONTINUE these medications which have NOT CHANGED    Details   Ascorbic Acid (VITAMIN C PO) Take 500 mg by mouth daily      ASPIRIN PO Take 81 mg by mouth daily      Cholecalciferol (VITAMIN D-3) 1000 units CAPS Take 2 capsules by mouth daily      cinnamon 500 MG CAPS Take 1 capsule by mouth daily      Flax Oil-Fish Oil-Borage Oil (FISH OIL-FLAX OIL-BORAGE OIL PO) Take 1 capsule by mouth daily      HYDROCHLOROTHIAZIDE PO Take 25 mg by mouth daily      Insulin Aspart (NOVOLOG SC) Insulin pump      INSULIN PUMP - OUTPATIENT       LEVOTHYROXINE SODIUM PO Take 50 mcg by mouth daily      LISINOPRIL PO Take 40 mg by mouth daily      METOPROLOL TARTRATE PO Take 50 mg by mouth 2 times daily      multivitamin, therapeutic with minerals (MULTI-VITAMIN) TABS tablet Take 1 tablet by mouth daily      ROSUVASTATIN CALCIUM PO Take 10 mg by mouth daily           Allergies   No Known Allergies  Data   Most Recent 3 CBC's:  Recent Labs   Lab Test  11/23/18   0404  11/22/18   1537  10/06/18   1710   WBC  6.1  8.5  7.4   HGB  12.1*  14.2  15.1   MCV  92  93  93   PLT  207  208  199      Most Recent 3 BMP's:  Recent Labs   Lab Test  11/23/18   0404  11/23/18   0101  11/22/18   2020   NA  140  137  134   POTASSIUM  4.8  5.0  4.9   CHLORIDE  107  105  102   CO2  26  27  24   BUN  38*  40*  43*   CR  1.38*  1.49*  1.55*   ANIONGAP  7  5  8   DESMOND  8.9  8.6  9.2   GLC  123*  123*  79     Most Recent 2 LFT's:  Recent Labs   Lab Test  11/22/18   1537  10/07/18   0522   AST  33  15   ALT  39  33   ALKPHOS  100  79   BILITOTAL  1.1*  0.4     Most Recent INR's and Anticoagulation Dosing History:  Anticoagulation Dose History     Recent Dosing and Labs Latest Ref Rng & Units 11/22/2018    INR 0 - 1.3 0.83        Most Recent 3 Troponin's:  Recent Labs   Lab Test  10/06/18   1710   TROPI  <0.015     Most Recent Cholesterol Panel:No lab results found.  Most Recent 6 Bacteria Isolates From Any Culture (See EPIC Reports for Culture Details):  Recent  Labs   Lab Test  10/06/18   1709   CULT  No growth after 6 days  1 of 2 bottles  Coagulase negative Staphylococcus  Susceptibility testing not routinely done  *  Critical Value called to and read back by  Ana Kharion @ 9198 on 10/09/2018 by AM  Gram stain of bottle 1 called    Gram stain review consistent with reported results.     Most Recent TSH, T4 and A1c Labs:  Recent Labs   Lab Test  10/06/18   1710   A1C  11.4*     Results for orders placed or performed during the hospital encounter of 10/06/18   XR Chest Port 1 View    Narrative    PROCEDURE: XR CHEST PORT 1 VW 10/6/2018 5:33 PM    HISTORY: Fever;     COMPARISONS: None.    TECHNIQUE: Single view.    FINDINGS: Heart and pulmonary vasculature are normal. Lungs are clear  and no pleural effusion is seen.         Impression    IMPRESSION: No acute disease.    MJ COBURN MD

## 2018-11-23 NOTE — PROGRESS NOTES
Reviewed discharge instructions with patient and family member.  He verbalized understanding of these instructions and did not have additional questions.  Patient discharged to home via private vehicle with family member.

## 2018-11-23 NOTE — PROGRESS NOTES
Patient has own insulin pump on, using left lower abdominal site.  Disclaimer of Liability and Responsibility: Use of Personal Patient Device to Administer Medication Form has been completed by patient and witnessed by this writer.  Jose Manuel, Pharm D notified of this as well.  Palma Schumacher RN on 11/23/2018 at 8:53 AM

## 2018-11-27 NOTE — PROGRESS NOTES
SUBJECTIVE:   Homer Triana is a 58 year old male who presents to clinic today for the following health issues:        Diabetes [facility] FU    Patient is checking blood sugars: four times daily.    Blood sugar testing frequency justification: Uncontrolled diabetes  Results are as follows:         am - 182  Lunch 114  Dinner-215  Today 97    Diabetic concerns: blood sugar frequently over 200 and other - Hospitalized Thanksgiving Day in [place]-forgot to put pump on and BS over 800 kept over night once stabilized BS and 4 weeks previous to that in [place] not feeling right.     Symptoms of hypoglycemia (low blood sugar): shaky, dizzy, weak, lethargy, blurred vision     Paresthesias (numbness or burning in feet) or sores: Yes bad     Date of last diabetic eye exam: current  Does have appt with his primary tomorrow.  He is feeling better but still shaky.  Lives alone.  Diabetic over 25 years.      Diabetes Medications:  1. Novolog per insulin pump  ASA use: yes, 81 mg daily  Statin use: yes, yes, crestor 10 mg daily    Diabetes Management Resources      BP Readings from Last 2 Encounters:   11/23/18 124/65   10/07/18 150/84     Hemoglobin A1C (%)   Date Value   10/06/2018 11.4 (H)         Amount of exercise or physical activity: trying to start walking    Problems taking medications regularly: No, does not use his pump to his full capacity     Medication side effects: none    Diet: regular (no restrictions)            Problem list and histories reviewed & adjusted, as indicated.  Additional history: as documented    Patient Active Problem List   Diagnosis     Uncontrolled type 2 DM with hyperosmolar nonketotic hyperglycemia (H)     Syncope, unspecified syncope type     Diabetic neuropathy (H)     Back pain     Generalized muscle weakness     Essential hypertension     Acute kidney injury (H)     Uncontrolled type 2 diabetes mellitus with hyperosmolar nonketotic hyperglycemia (H)     DKA (diabetic  ketoacidosis) (H)     Past Surgical History:   Procedure Laterality Date     CHOLECYSTECTOMY         Social History   Substance Use Topics     Smoking status: Former Smoker     Smokeless tobacco: Never Used     Alcohol use Yes      Comment: moderately     No family history on file.      Current Outpatient Prescriptions   Medication Sig Dispense Refill     ascorbic acid (VITAMIN C) 500 MG tablet Take 500 mg by mouth daily       aspirin (ASA) 81 MG chewable tablet Take 81 mg by mouth daily       cinnamon 500 MG TABS Take 1 tablet by mouth daily       FLAX OIL-FISH OIL-BORAGE OIL PO Take 1 capsule by mouth daily       hydrochlorothiazide (HYDRODIURIL) 25 MG tablet Take 25 mg by mouth daily       Insulin Aspart (NOVOLOG SC) Insulin pump       INSULIN PUMP - OUTPATIENT        levothyroxine (SYNTHROID/LEVOTHROID) 50 MCG tablet Take 50 mcg by mouth daily       lisinopril (PRINIVIL/ZESTRIL) 40 MG tablet Take 40 mg by mouth daily       metoprolol tartrate (LOPRESSOR) 50 MG tablet Take 50 mg by mouth daily (Ordered twice daily per MD, patient takes once daily, per patient MD negrete this, Pharmacy recommends succinate if only daily)       multivitamin, therapeutic with minerals (MULTI-VITAMIN) TABS tablet Take 1 tablet by mouth daily       rosuvastatin (CRESTOR) 10 MG tablet Take 10 mg by mouth daily       vitamin D3 (CHOLECALCIFEROL) 2000 units tablet Take 1 tablet by mouth daily       No Known Allergies    Reviewed and updated as needed this visit by clinical staff       Reviewed and updated as needed this visit by Provider         ROS:  CONSTITUTIONAL:NEGATIVE for fever, chills, change in weight  INTEGUMENTARY/SKIN: bruising at arms from recent hospitalization, ulcer on left foot followed by DR Stockton  EYES: poor vision, does go yearly and as needed   ENT/MOUTH: NEGATIVE for ear, mouth and throat problems  RESP:NEGATIVE for significant cough or SOB  CV: NEGATIVE for chest pain, palpitations or peripheral edema  GI: always  "has diarrhea since having gallbladder removed  : denies dysuria  MUSCULOSKELETAL: starting to get arthritis in shoulders  NEURO: continues to feel weak since hospitalization   ENDOCRINE: Hx diabetes and Hx thyroid disease  PSYCHIATRIC: anxiety related to trying to get diabetes under control     OBJECTIVE:     /76  Pulse 97  Temp 98.9  F (37.2  C) (Tympanic)  Resp 18  Ht 5' 10\" (1.778 m)  Wt 220 lb (99.8 kg)  SpO2 99%  BMI 31.57 kg/m2  Body mass index is 31.57 kg/(m^2).   GENERAL: healthy, alert and no distress  NECK: no adenopathy, no asymmetry, masses, or scars and thyroid normal to palpation  RESP: lungs clear to auscultation - no rales, rhonchi or wheezes  CV: regular rate and rhythm, normal S1 S2, no S3 or S4, no murmur, click or rub, no peripheral edema and peripheral pulses strong  ABDOMEN: soft, nontender, no hepatosplenomegaly, no masses and bowel sounds normal  PSYCH: mentation appears normal, affect normal/bright    Diagnostic Test Results:  Labs recently done at [facility]    ASSESSMENT:    insulin pump download, which is as followed:    Insulin pump information:   Average: no infor  Basal/bolus ratio: 26.4 (70%)/11.5 (30%)        Basal dose:  00:00 1.50  03:00 1.10  07:00 1.00  22:00 1.50    Adjusted the basal rate:  No adjustment today     Started checking BGs on 11/24/2018.  Homer states he follows a sliding scale for covering his BGs and he usually covers after he eats.    Period Average (mg/dl): 174  Highest Value (mg/dl): 482  Lowest Value (mg/dl):94  Values Above Goal (180 mg/dl): 7  Values Within Goal ( mg/dl): 11  Values Below Goal (70 mg/dl): 0        PLAN:   1. Type 1 diabetes mellitus with hyperglycemia (H)  Homer is not using his pump to the full capacity, but he is starting to utilize his pump and checking his BGs more since his hospitalization.  Homer is using a sliding scale. He should give insulin 5-10 minutes before eating according to his BGs and " carbs. With limited data no changes made to pump today.     Discussed with Homer if he would like to continue with [facility] between endocrinology appointment he is welcome to return we would just need a referral. Will request referral from PCP.     Homer is very interested in controlling his diabetes.  Homer should call [employer] to see if he would qualify for the new pump. He would benefit from the new pump with a sensor.           Patient Instructions   Continue working on healthy eating and moving (start low and slow, work up to 30 min, 5x/week)    BG goals:  Fasting and before meals <130, >80  2 hour after eating <180    We only need 1/2 of these numbers to be within target then your A1c will be within target    Medication changes   -no pump changes    Follow up   -with endocrine.  You are always welcome to come back to diabetic resource center between visit with endocrinology or if you would like a pump download     Call me sooner if any problems/concerns and/or questions develop including consistent low BGs <70 or consistent high BGs >200  [phone] (Unit Coordinator)    [phone] (Nurse)      ABBIE Berg CNP  [facility] - [place]

## 2018-11-28 ENCOUNTER — OFFICE VISIT (OUTPATIENT)
Dept: FAMILY MEDICINE | Facility: OTHER | Age: 58
End: 2018-11-28
Attending: NURSE PRACTITIONER
Payer: MEDICARE

## 2018-11-28 VITALS
BODY MASS INDEX: 31.5 KG/M2 | WEIGHT: 220 LBS | DIASTOLIC BLOOD PRESSURE: 76 MMHG | SYSTOLIC BLOOD PRESSURE: 134 MMHG | RESPIRATION RATE: 18 BRPM | HEIGHT: 70 IN | HEART RATE: 97 BPM | OXYGEN SATURATION: 99 % | TEMPERATURE: 98.9 F

## 2018-11-28 DIAGNOSIS — E10.65 TYPE 1 DIABETES MELLITUS WITH HYPERGLYCEMIA (H): Primary | ICD-10-CM

## 2018-11-28 PROCEDURE — G0463 HOSPITAL OUTPT CLINIC VISIT: HCPCS

## 2018-11-28 PROCEDURE — 99213 OFFICE O/P EST LOW 20 MIN: CPT | Performed by: NURSE PRACTITIONER

## 2018-11-28 ASSESSMENT — PATIENT HEALTH QUESTIONNAIRE - PHQ9: SUM OF ALL RESPONSES TO PHQ QUESTIONS 1-9: 5

## 2018-11-28 ASSESSMENT — ANXIETY QUESTIONNAIRES
GAD7 TOTAL SCORE: 4
2. NOT BEING ABLE TO STOP OR CONTROL WORRYING: NOT AT ALL
3. WORRYING TOO MUCH ABOUT DIFFERENT THINGS: SEVERAL DAYS
4. TROUBLE RELAXING: SEVERAL DAYS
7. FEELING AFRAID AS IF SOMETHING AWFUL MIGHT HAPPEN: NOT AT ALL
6. BECOMING EASILY ANNOYED OR IRRITABLE: SEVERAL DAYS
5. BEING SO RESTLESS THAT IT IS HARD TO SIT STILL: NOT AT ALL
IF YOU CHECKED OFF ANY PROBLEMS ON THIS QUESTIONNAIRE, HOW DIFFICULT HAVE THESE PROBLEMS MADE IT FOR YOU TO DO YOUR WORK, TAKE CARE OF THINGS AT HOME, OR GET ALONG WITH OTHER PEOPLE: SOMEWHAT DIFFICULT
1. FEELING NERVOUS, ANXIOUS, OR ON EDGE: SEVERAL DAYS

## 2018-11-28 ASSESSMENT — PAIN SCALES - GENERAL: PAINLEVEL: NO PAIN (0)

## 2018-11-28 NOTE — NURSING NOTE
"Chief Complaint   Patient presents with     Diabetes       Initial /76  Pulse 97  Temp 98.9  F (37.2  C) (Tympanic)  Resp 18  Ht 5' 10\" (1.778 m)  Wt 220 lb (99.8 kg)  SpO2 99%  BMI 31.57 kg/m2 Estimated body mass index is 31.57 kg/(m^2) as calculated from the following:    Height as of this encounter: 5' 10\" (1.778 m).    Weight as of this encounter: 220 lb (99.8 kg).  Medication Reconciliation: complete    Celina Becerril LPN    "

## 2018-11-28 NOTE — MR AVS SNAPSHOT
After Visit Summary   11/28/2018    Homer Triana    MRN: [id number]           Patient Information     Date Of Birth          1960        Visit Information        Provider Department      11/28/2018 12:20 PM Priti Salinas APRN CNP [facility]        Care Instructions    Continue working on healthy eating and moving (start low and slow, work up to 30 min, 5x/week)    BG goals:  Fasting and before meals <130, >80  2 hour after eating <180    We only need 1/2 of these numbers to be within target then your A1c will be within target    Medication changes   -no pump changes    Follow up   -with endocrine.  You are always welcome to come back to diabetic resource center between visit with endocrinology or if you would like a pump download     Call me sooner if any problems/concerns and/or questions develop including consistent low BGs <70 or consistent high BGs >200  [phone] (Unit Coordinator)    [phone] (Nurse)          Follow-ups after your visit        Who to contact     If you have questions or need follow up information about today's clinic visit or your schedule please contact [facility] directly at [phone].  Normal or non-critical lab and imaging results will be communicated to you by MyChart, letter or phone within 4 business days after the clinic has received the results. If you do not hear from us within 7 days, please contact the clinic through MyChart or phone. If you have a critical or abnormal lab result, we will notify you by phone as soon as possible.  Submit refill requests through [employer] or call your pharmacy and they will forward the refill request to us. Please allow 3 business days for your refill to be completed.          Additional Information About Your Visit        Care EveryWhere ID     This is your Care EveryWhere ID. This could be used by other organizations to access your [facility]  "records  [id number]        Your Vitals Were     Pulse Temperature Respirations Height Pulse Oximetry BMI (Body Mass Index)    97 98.9  F (37.2  C) (Tympanic) 18 5' 10\" (1.778 m) 99% 31.57 kg/m2       Blood Pressure from Last 3 Encounters:   11/28/18 134/76   11/23/18 124/65   10/07/18 150/84    Weight from Last 3 Encounters:   11/28/18 220 lb (99.8 kg)   11/22/18 220 lb 10.9 oz (100.1 kg)   10/06/18 217 lb 9.5 oz (98.7 kg)              Today, you had the following     No orders found for display       Primary Care Provider Office Phone # Fax #    Russ LESTER DO [place] [phone]       [facility] [address] [facility] [address]        Equal Access to Services     [facility] : Hadii aad ku hadasho Soomaali, waaxda luqadaha, qaybta kaalmada adeegyada, waxay aprilin hayaan desmond cabello . So [facility] [phone].    ATENCIÓN: Si habla español, tiene a berry disposición servicios gratuitos de asistencia lingüística. Llame al [phone].    We comply with applicable federal civil rights laws and [place] laws. We do not discriminate on the basis of race, color, national origin, age, disability, sex, sexual orientation, or gender identity.            Thank you!     Thank you for choosing [facility]  for your care. Our goal is always to provide you with excellent care. Hearing back from our patients is one way we can continue to improve our services. Please take a few minutes to complete the written survey that you may receive in the mail after your visit with us. Thank you!             Your Updated Medication List - Protect others around you: Learn how to safely use, store and throw away your medicines at www.disposemymeds.org.          This list is accurate as of 11/28/18  1:18 PM.  Always use your most recent med list.                   Brand Name Dispense Instructions for use Diagnosis    aspirin 81 MG chewable tablet    ASA     Take 81 mg by mouth daily "        cinnamon 500 MG Tabs      Take 1 tablet by mouth daily        FLAX OIL-FISH OIL-BORAGE OIL PO      Take 1 capsule by mouth daily        hydrochlorothiazide 25 MG tablet    HYDRODIURIL     Take 25 mg by mouth daily        insulin pump infusion           levothyroxine 50 MCG tablet    SYNTHROID/LEVOTHROID     Take 50 mcg by mouth daily        lisinopril 40 MG tablet    PRINIVIL/ZESTRIL     Take 40 mg by mouth daily        metoprolol tartrate 50 MG tablet    LOPRESSOR     Take 50 mg by mouth daily (Ordered twice daily per MD, patient takes once daily, per patient MD negrete this, Pharmacy recommends succinate if only daily)        Multi-vitamin tablet      Take 1 tablet by mouth daily        NOVOLOG SC      Insulin pump        rosuvastatin 10 MG tablet    CRESTOR     Take 10 mg by mouth daily        vitamin C 500 MG tablet    ASCORBIC ACID     Take 500 mg by mouth daily        vitamin D3 2000 units tablet    CHOLECALCIFEROL     Take 1 tablet by mouth daily

## 2018-11-28 NOTE — PATIENT INSTRUCTIONS
Continue working on healthy eating and moving (start low and slow, work up to 30 min, 5x/week)    BG goals:  Fasting and before meals <130, >80  2 hour after eating <180    We only need 1/2 of these numbers to be within target then your A1c will be within target    Medication changes   -no pump changes    Follow up   -with endocrine.  You are always welcome to come back to diabetic resource center between visit with endocrinology or if you would like a pump download     Call me sooner if any problems/concerns and/or questions develop including consistent low BGs <70 or consistent high BGs >200  [phone] (Unit Coordinator)    [phone] (Nurse)

## 2018-11-29 ENCOUNTER — TRANSFERRED RECORDS (OUTPATIENT)
Dept: HEALTH INFORMATION MANAGEMENT | Facility: CLINIC | Age: 58
End: 2018-11-29

## 2018-11-29 ASSESSMENT — ANXIETY QUESTIONNAIRES: GAD7 TOTAL SCORE: 4

## 2018-12-20 ENCOUNTER — TRANSFERRED RECORDS (OUTPATIENT)
Dept: HEALTH INFORMATION MANAGEMENT | Facility: CLINIC | Age: 58
End: 2018-12-20

## 2019-01-08 ENCOUNTER — TRANSFERRED RECORDS (OUTPATIENT)
Dept: HEALTH INFORMATION MANAGEMENT | Facility: CLINIC | Age: 59
End: 2019-01-08

## 2019-01-08 LAB
CREAT SERPL-MCNC: 1.48 MG/DL (ref 0.81–1.5)
GFR SERPL CREATININE-BSD FRML MDRD: 49 ML/MIN/1.73M2
GLUCOSE SERPL-MCNC: 131 MG/DL (ref 60–99)
HBA1C MFR BLD: 9.2 % (ref 4–5.6)
POTASSIUM SERPL-SCNC: 4.7 MEQ/L (ref 3.5–5.1)
TSH SERPL-ACNC: 10.32 UIU/ML (ref 0.35–4.8)

## 2019-01-09 ENCOUNTER — TELEPHONE (OUTPATIENT)
Dept: EDUCATION SERVICES | Facility: HOSPITAL | Age: 59
End: 2019-01-09

## 2019-01-09 NOTE — TELEPHONE ENCOUNTER
Pt calls he saw his Endocrinologist recently and said he results were supposed to be sent to our office and thinks he is supposed to make an appt.

## 2019-01-11 NOTE — TELEPHONE ENCOUNTER
I called the pt spoke to Dr Stockton today. He saw Dr Jackson recently who changed his pump to a carb counting pump. Dr Stockton said Dr Jackson's notes stated he would be sending notes here. Pt would like to schedule an appt. I advised he needs to get a referral and have his records sent for his appt. Appt scheduled.

## 2019-01-18 ENCOUNTER — ALLIED HEALTH/NURSE VISIT (OUTPATIENT)
Dept: EDUCATION SERVICES | Facility: OTHER | Age: 59
End: 2019-01-18
Attending: NURSE PRACTITIONER
Payer: MEDICARE

## 2019-01-18 VITALS
WEIGHT: 223 LBS | RESPIRATION RATE: 16 BRPM | DIASTOLIC BLOOD PRESSURE: 94 MMHG | BODY MASS INDEX: 32 KG/M2 | OXYGEN SATURATION: 100 % | HEART RATE: 88 BPM | SYSTOLIC BLOOD PRESSURE: 134 MMHG

## 2019-01-18 DIAGNOSIS — E10.65 UNCONTROLLED TYPE 1 DIABETES MELLITUS WITH HYPERGLYCEMIA (H): ICD-10-CM

## 2019-01-18 DIAGNOSIS — E11.00 UNCONTROLLED TYPE 2 DM WITH HYPEROSMOLAR NONKETOTIC HYPERGLYCEMIA (H): Primary | ICD-10-CM

## 2019-01-18 PROCEDURE — 99214 OFFICE O/P EST MOD 30 MIN: CPT | Performed by: NURSE PRACTITIONER

## 2019-01-18 PROCEDURE — G0463 HOSPITAL OUTPT CLINIC VISIT: HCPCS

## 2019-01-18 PROCEDURE — 95250 CONT GLUC MNTR PHYS/QHP EQP: CPT | Performed by: NURSE PRACTITIONER

## 2019-01-18 RX ORDER — LEVOTHYROXINE SODIUM 75 UG/1
75 TABLET ORAL DAILY
COMMUNITY
End: 2019-01-18 | Stop reason: DRUGHIGH

## 2019-01-18 ASSESSMENT — PAIN SCALES - GENERAL: PAINLEVEL: NO PAIN (0)

## 2019-01-18 NOTE — PROGRESS NOTES
SUBJECTIVE:   Homer Triana is a 58 year old male who presents to clinic today for the following health issues:      Diabetes Follow-up    Patient is checking blood sugars: 1.6 times daily.    Blood sugar testing frequency justification: Uncontrolled diabetes, Adjustment of medication(s), Risk of hypoglycemia with medication(s) and Patient modifying lifestyle changes (diet, exercise) with blood sugars  Results are as follows:  Period Average (mg/dl): 180  Highest Value (mg/dl): 362  Lowest Value (mg/dl):69  Values Above Goal (180 mg/dl): 11  Values Within Goal ( mg/dl): 11    Values Below Goal (70 mg/dl): 1        Diabetic concerns: blood sugar frequently over 200 and other - starting to have lower BGs     Symptoms of hypoglycemia (low blood sugar): shaky, weak, sweaty, Symptoms occur if sugars < 100.     Paresthesias (numbness or burning in feet) or sores: Yes numbness in hands and feet      Date of last diabetic eye exam: 3 weeks ago    BP Readings from Last 2 Encounters:   01/18/19 (!) 134/94   11/28/18 134/76     Hemoglobin A1C (%)   Date Value   10/06/2018 11.4 (H)     Diabetes Medications:  1. novolog with setting on insulin pump.   ASA use: yes, 81 mg daily  Statin use: yes, Crestor 10 mg daily        Diabetes Management Resources    Amount of exercise or physical activity: limited due to poor eye sight worried about slipping    Problems taking medications regularly: No    Medication side effects: none    Diet: plan to have dietician follow up with patient          Problem list and histories reviewed & adjusted, as indicated.  Additional history: as documented    Patient Active Problem List   Diagnosis     Uncontrolled type 2 DM with hyperosmolar nonketotic hyperglycemia (H)     Syncope, unspecified syncope type     Diabetic neuropathy (H)     Back pain     Generalized muscle weakness     Essential hypertension     Acute kidney injury (H)     Uncontrolled type 2 diabetes mellitus with hyperosmolar  nonketotic hyperglycemia (H)     DKA (diabetic ketoacidosis) (H)     Past Surgical History:   Procedure Laterality Date     CHOLECYSTECTOMY         Social History     Tobacco Use     Smoking status: Former Smoker     Smokeless tobacco: Never Used   Substance Use Topics     Alcohol use: Yes     Comment: moderately     No family history on file.      Current Outpatient Medications   Medication Sig Dispense Refill     ascorbic acid (VITAMIN C) 500 MG tablet Take 500 mg by mouth daily       aspirin (ASA) 81 MG chewable tablet Take 81 mg by mouth daily       cinnamon 500 MG TABS Take 1 tablet by mouth daily       FLAX OIL-FISH OIL-BORAGE OIL PO Take 1 capsule by mouth daily       hydrochlorothiazide (HYDRODIURIL) 25 MG tablet Take 25 mg by mouth daily       Insulin Aspart (NOVOLOG SC) Insulin pump       INSULIN PUMP - OUTPATIENT        levothyroxine (SYNTHROID/LEVOTHROID) 50 MCG tablet Take 50 mcg by mouth daily       lisinopril (PRINIVIL/ZESTRIL) 40 MG tablet Take 40 mg by mouth daily       metoprolol tartrate (LOPRESSOR) 50 MG tablet Take 50 mg by mouth 2 times daily (Ordered twice daily per MD, patient takes once daily, per patient MD negrete this, Pharmacy recommends succinate if only daily)        multivitamin, therapeutic with minerals (MULTI-VITAMIN) TABS tablet Take 1 tablet by mouth daily       rosuvastatin (CRESTOR) 10 MG tablet Take 10 mg by mouth daily       vitamin D3 (CHOLECALCIFEROL) 2000 units tablet Take 1 tablet by mouth daily       No Known Allergies    Reviewed and updated as needed this visit by clinical staff  Tobacco  Allergies  Meds       Reviewed and updated as needed this visit by Provider  Allergies  Meds         ROS:  CONSTITUTIONAL:NEGATIVE for fever, chills, change in weight  INTEGUMENTARY/SKIN: dry skin, using gold bond diabetic lotion   EYES: did have a recent eye exam, poor vision   ENT/MOUTH: recent sinus infection   RESP:NEGATIVE for significant cough or SOB  CV: NEGATIVE for chest  pain, palpitations or peripheral edema  GI: NEGATIVE for nausea, abdominal pain, heartburn, or change in bowel habits  : negative for dysuria, hematuria, decreased urinary stream, erectile dysfunction  MUSCULOSKELETAL: shoulder and knee pain chronic   NEURO: numbness and weakness hands and feet  ENDOCRINE: Hx diabetes and Hx thyroid disease  PSYCHIATRIC: NEGATIVE for changes in mood or affect    OBJECTIVE:     BP (!) 134/94   Pulse 88   Resp 16   Wt 101.2 kg (223 lb)   SpO2 100%   BMI 32.00 kg/m    Body mass index is 32 kg/m .   GENERAL: healthy, alert and no distress  NECK: no adenopathy, no asymmetry, masses, or scars and thyroid normal to palpation  RESP: lungs clear to auscultation - no rales, rhonchi or wheezes  CV: regular rate and rhythm, normal S1 S2, no S3 or S4, no murmur, click or rub, no peripheral edema and peripheral pulses strong  ABDOMEN: soft, nontender, no hepatosplenomegaly, no masses and bowel sounds normal  PSYCH: mentation appears normal, affect normal/bright    Diagnostic Test Results:  A1c 9.2 on 1/8/2019  TSH 10.3 on 1/8/2019  Urine /albumin/creatinine 38 in October 2018  Cholesterol 182 in October 2018  LDL 98  HDL 64    ASSESSMENT:   Professional CGM insertion:  -Sensor type: Freestyle LibrePro  -Lot #: [id number]  -expiration date: 6/30/2019  -Indication (S) for CGM Study: to check a continuous glucose monitor due to limited information with BGs and possible order personal system because Homer states it hurts to check his BGs to often.     Received Homer insulin pump download, which is as followed:    Insulin pump information:     Basal/bolus ratio: 26.9 (55%)/22.1 (45%)        Basal dose:  00:00 1.50  03:00 1.10  07:00 1.00  22:00 1.50    Adjusted the basal rate:  Not adjusted today        PLAN:   1. Uncontrolled type 1 diabetes mellitus with hyperglycemia (H)  -Plan to try the Freestyle LibrePro for 2 weeks. Homer does not like poking his fingers due to pain. Plan to try the  LibrePro and see if he tolerates it and if so can consider ordering him a personal one if covered by his insurance.   -plan for Homer to see the dietician to learn how to count carbs once he understand counting carbs we can add this to his pump for better control of his diabetes   -No pump changes today. Plan for follow up in 2 weeks to review CGM and discuss personal device.  -did discuss entering his BGs into his pump. We did walk through the process.   -he is encouraged to check his BGs more frequently to know how much insulin he should bolus. At this time he is putting in a bolus amount  -discussed checking BG before a meal and 2 hours after a meal so he would know how certain food affected him and if his boluses was enough.     Homer has an appointment with Dr Jackson in February.       Patient Instructions   Continue working on healthy eating and moving (start low and slow, work up to 30 min, 5x/week)    BG goals:  Fasting and before meals <130, >80  2 hour after eating <180    We only need 1/2 of these numbers to be within target then your A1c will be within target    Medication changes   -no pump changes    Follow up   -2 weeks for CGM download  -follow up with dietician for carb counting    Call me sooner if any problems/concerns and/or questions develop including consistent low BGs <70 or consistent high BGs >200  [phone] (Unit Coordinator)    [phone] (Nurse)      ABBIE Berg [facility] - [place]    With the electronic record, we can now more quickly and easily track our patient diabetic goals. Our diabetes clinical review is in progress and these are the indicators we are monitoring for good diabetes health.     1.) HbA1C less than 7 (measurement of your average blood sugars)  2.) Blood Pressure less than 140/80  3.) LDL less than 100 (bad cholesterol)  4.) HbA1C is checked in the last 6 months and below 7% (more frequently if not at goal or adjusting  medications)  5.) LDL is checked in the last 12 months (more frequently if not at goal or adjusting medications)  6.) Taking one baby aspirin daily (unless otherwise instructed)  7.) No tobacco use  8) Statin use     You have achieved 6 out of 8 of these and I am encouraging you to come in and get tuned up to achieve 8 out of 8!  Here is what you have achieved so far in my goals for you:  1.) HbA1C  less than 7:                              NO  Your last  HbA1C :   Lab Results   Component Value Date    A1C 11.4 10/06/2018     2.) Blood Pressure less than 140/80:       NO   Your last    BP Readings from Last 1 Encounters:       BP (!) 134/94   Pulse 88   Resp 16   Wt 101.2 kg (223 lb)   SpO2 100%   BMI 32.00 kg/m    3.) LDL less than 100:                              YES      Your last   LDL       No results found for: LDL  4.) Checked HbA1C in the past 6 months: YES      5.) Checked LDL in the past 12 months:    YES      6.) Taking one aspirin daily:                       YES     7.) No tobacco use:                                        YES      8.) Statin use      YES

## 2019-01-18 NOTE — PATIENT INSTRUCTIONS
Continue working on healthy eating and moving (start low and slow, work up to 30 min, 5x/week)    BG goals:  Fasting and before meals <130, >80  2 hour after eating <180    We only need 1/2 of these numbers to be within target then your A1c will be within target    Medication changes   -no pump changes    Follow up   -2 weeks for CGM download  -follow up with dietician for carb counting    Call me sooner if any problems/concerns and/or questions develop including consistent low BGs <70 or consistent high BGs >200  [phone] (Unit Coordinator)    [phone] (Nurse)

## 2019-01-18 NOTE — PROGRESS NOTES
"Chief Complaint   Patient presents with     Diabetes       Initial BP (!) 134/94   Pulse 88   Resp 16   Wt 101.2 kg (223 lb)   SpO2 100%   BMI 32.00 kg/m   Estimated body mass index is 32 kg/m  as calculated from the following:    Height as of 11/28/18: 1.778 m (5' 10\").    Weight as of this encounter: 101.2 kg (223 lb).  Medication Reconciliation: complete    Sheila Parada LPN    "

## 2019-01-21 ENCOUNTER — HOSPITAL ENCOUNTER (OUTPATIENT)
Dept: EDUCATION SERVICES | Facility: HOSPITAL | Age: 59
Discharge: HOME | End: 2019-01-21
Attending: NURSE PRACTITIONER | Admitting: INTERNAL MEDICINE
Payer: MEDICARE

## 2019-01-21 VITALS
SYSTOLIC BLOOD PRESSURE: 161 MMHG | TEMPERATURE: 98.3 F | DIASTOLIC BLOOD PRESSURE: 90 MMHG | OXYGEN SATURATION: 96 % | WEIGHT: 223 LBS | BODY MASS INDEX: 31.92 KG/M2 | HEIGHT: 70 IN | HEART RATE: 80 BPM

## 2019-01-21 DIAGNOSIS — E10.65 TYPE 1 DIABETES MELLITUS WITH HYPERGLYCEMIA (H): Primary | ICD-10-CM

## 2019-01-21 PROCEDURE — 97802 MEDICAL NUTRITION INDIV IN: CPT | Performed by: DIETITIAN, REGISTERED

## 2019-01-21 ASSESSMENT — MIFFLIN-ST. JEOR: SCORE: 1847.77

## 2019-01-21 ASSESSMENT — PAIN SCALES - GENERAL: PAINLEVEL: NO PAIN (0)

## 2019-01-21 NOTE — LETTER
"    1/21/2019        RE: Homer Triana  [address]  [place] [address]        Pt referred via CNP for MNT - carbohydrate counting r/t Type 1 diabetes with insulin pump.  Pt has had diabetes x 25 years.  He has had education on carbohydrates in the past but has not engaged in counting them.  Pt has been using pump to run basal insulin and has been manually giving himself boluses of whatever he felt he needed.  He has never entered carbohydrate intake into his pump.     /90   Pulse 80   Temp 98.3  F (36.8  C) (Tympanic)   Ht 1.778 m (5' 10\")   Wt 101.2 kg (223 lb)   SpO2 96%   BMI 32.00 kg/m      Pt reports weight gain of 25# in the past year which he attributes to skilled nursing leading to less activity and his eating habits.      Verbal diet recall obtained:  Breakfast - most often just coffee  Lunch- soup/sandwich   Supper-soups, chili   Pt notes he orders take out or dines out often.  He admits he eats more processed food items than he should as he lives alone and cooks for one.  He does not consume drinks high in sugar and does not appear to have excessive intake of sweets.  Discussion notes that pt does have knowledge of which foods contain carbohydrates and which foods are low in carbohydrates.      Educated pt on rationale/importance of accurately counting carbohydrates so that eventually he can enter them into his insulin pump to allow of more effective use of his pump.  Reviewed using foods labels as first resource for counting carbohydrates if label is available.  Provided pt with two carbohydrate counting books as a resource.  He is currently wearing a glucose sensor and is logging food intake for study.  I encouraged him to begin measuring portions and courting carbohydrates and logging them on his sheet for practice.      PLAN:  Follow up for CGM evaluation with CNP.  RD available as needed.     Total time spent with pt was 45 minutes.        Sincerely,        Jael Page RD    "

## 2019-01-21 NOTE — PROGRESS NOTES
"Pt referred via CNP for MNT - carbohydrate counting r/t Type 1 diabetes with insulin pump.  Pt has had diabetes x 25 years.  He has had education on carbohydrates in the past but has not engaged in counting them.  Pt has been using pump to run basal insulin and has been manually giving himself boluses of whatever he felt he needed.  He has never entered carbohydrate intake into his pump.     /90   Pulse 80   Temp 98.3  F (36.8  C) (Tympanic)   Ht 1.778 m (5' 10\")   Wt 101.2 kg (223 lb)   SpO2 96%   BMI 32.00 kg/m     Pt reports weight gain of 25# in the past year which he attributes to [facility] leading to less activity and his eating habits.      Verbal diet recall obtained:  Breakfast - most often just coffee  Lunch- soup/sandwich   Supper-soups, chili   Pt notes he orders take out or dines out often.  He admits he eats more processed food items than he should as he lives alone and cooks for one.  He does not consume drinks high in sugar and does not appear to have excessive intake of sweets.  Discussion notes that pt does have knowledge of which foods contain carbohydrates and which foods are low in carbohydrates.      Educated pt on rationale/importance of accurately counting carbohydrates so that eventually he can enter them into his insulin pump to allow of more effective use of his pump.  Reviewed using foods labels as first resource for counting carbohydrates if label is available.  Provided pt with two carbohydrate counting books as a resource.  He is currently wearing a glucose sensor and is logging food intake for study.  I encouraged him to begin measuring portions and courting carbohydrates and logging them on his sheet for practice.      PLAN:  Follow up for CGM evaluation with CNP.  RD available as needed.     Total time spent with pt was 45 minutes.    "

## 2019-01-21 NOTE — PATIENT INSTRUCTIONS
"Chief Complaint   Patient presents with     Consult     Carb counting Consult         Initial /90   Pulse 80   Temp 98.3  F (36.8  C) (Tympanic)   Ht 1.778 m (5' 10\")   Wt 101.2 kg (223 lb)   SpO2 96%   BMI 32.00 kg/m   Estimated body mass index is 32 kg/m  as calculated from the following:    Height as of this encounter: 1.778 m (5' 10\").    Weight as of this encounter: 101.2 kg (223 lb).  Medication Reconciliation: complete    Ruba Pleitez LPN  "

## 2019-02-01 ENCOUNTER — ALLIED HEALTH/NURSE VISIT (OUTPATIENT)
Dept: EDUCATION SERVICES | Facility: OTHER | Age: 59
End: 2019-02-01
Attending: NURSE PRACTITIONER
Payer: MEDICARE

## 2019-02-01 VITALS
OXYGEN SATURATION: 98 % | DIASTOLIC BLOOD PRESSURE: 77 MMHG | BODY MASS INDEX: 31.94 KG/M2 | HEART RATE: 84 BPM | WEIGHT: 222.6 LBS | RESPIRATION RATE: 16 BRPM | SYSTOLIC BLOOD PRESSURE: 139 MMHG

## 2019-02-01 DIAGNOSIS — E10.65 UNCONTROLLED TYPE 1 DIABETES MELLITUS WITH HYPERGLYCEMIA (H): Primary | ICD-10-CM

## 2019-02-01 PROCEDURE — G0463 HOSPITAL OUTPT CLINIC VISIT: HCPCS

## 2019-02-01 PROCEDURE — 95251 CONT GLUC MNTR ANALYSIS I&R: CPT | Performed by: NURSE PRACTITIONER

## 2019-02-01 PROCEDURE — 95250 CONT GLUC MNTR PHYS/QHP EQP: CPT | Performed by: NURSE PRACTITIONER

## 2019-02-01 PROCEDURE — 99214 OFFICE O/P EST MOD 30 MIN: CPT | Performed by: NURSE PRACTITIONER

## 2019-02-01 ASSESSMENT — PAIN SCALES - GENERAL: PAINLEVEL: NO PAIN (0)

## 2019-02-01 NOTE — PATIENT INSTRUCTIONS
Continue working on healthy eating and moving (start low and slow, work up to 30 min, 5x/week)    BG goals:  Fasting and before meals <130, >80  2 hour after eating <180    We only need 1/2 of these numbers to be within target then your A1c will be within target    Medication changes   -changes to pump    Recommendations:  When you have a low eat 15 grams of carbs, recheck in 15 minutes if still low (below 70) eat another 15 grams of carbs. Once above above 70 add protein     Sliding Scale  150-200  1 unit  201-250  2 units  251-300 3 units  301-350  4 units  351-400 5 units  >400  6 units      -follow sliding scale for bolus without eating  -when eating you should add bolus plus carbs    -add 1 unit with coffee and creamer beside sliding scale     Please check fast BG daily, before lunch, before supper, and 2 hours after supper    Please eat protein with any all carb meal or snack    Follow up   - months 2 months or as needed call with any questions or concerns      Call me sooner if any problems/concerns and/or questions develop including consistent low BGs <70 or consistent high BGs >200  [phone] (Unit Coordinator)    [phone] (Nurse)

## 2019-02-01 NOTE — PROGRESS NOTES
SUBJECTIVE:   Homer Triana is a 58 year old male who presents to clinic today for the following health issues:      Diabetes Follow-up    Patient is checking blood sugars: 1.3 times daily.    Blood sugar testing frequency justification: Uncontrolled diabetes, Adjustment of medication(s), Risk of hypoglycemia with medication(s) and Patient modifying lifestyle changes (diet, exercise) with blood sugars  Results are as follows:  Period Average (mg/dl): 154  Highest Value (mg/dl): 296  Lowest Value (mg/dl):49  Values Above Goal (180 mg/dl): 6  Values Within Goal ( mg/dl): 7    Values Below Goal (70 mg/dl): 5      Diabetic concerns: other - getting diabetes undercontrol     Symptoms of hypoglycemia (low blood sugar): shaky, sweaty and foggy     Paresthesias (numbness or burning in feet) or sores: Yes neuropathy feet and hands     Date of last diabetic eye exam: 1 months ago at [facility]    Diabetes Medications:  1. Novolog per insulin pump  ASA use: yes, 81 mg daily  Statin use: yes, Rosuvastain (Crestor) 10 mg daily    BP Readings from Last 2 Encounters:   02/01/19 139/77   01/21/19 161/90     Hemoglobin A1C (%)   Date Value   01/08/2019 9.2 (H)   10/06/2018 11.4 (H)       Diabetes Management Resources    Amount of exercise or physical activity: limited    Problems taking medications regularly: No    Medication side effects: none    Diet: regular (no restrictions)            Problem list and histories reviewed & adjusted, as indicated.  Additional history: as documented    Patient Active Problem List   Diagnosis     Uncontrolled type 2 DM with hyperosmolar nonketotic hyperglycemia (H)     Syncope, unspecified syncope type     Diabetic neuropathy (H)     Back pain     Generalized muscle weakness     Essential hypertension     Acute kidney injury (H)     Uncontrolled type 2 diabetes mellitus with hyperosmolar nonketotic hyperglycemia (H)     DKA (diabetic ketoacidosis) (H)     Uncontrolled type 1  diabetes mellitus with hyperglycemia (H)     Past Surgical History:   Procedure Laterality Date     CHOLECYSTECTOMY       COLONOSCOPY - HIM SCAN  05/12/2015    Colonoscopy, Dr Huber 5/12/2015       Social History     Tobacco Use     Smoking status: Former Smoker     Smokeless tobacco: Never Used   Substance Use Topics     Alcohol use: Yes     Comment: moderately     No family history on file.      Current Outpatient Medications   Medication Sig Dispense Refill     ascorbic acid (VITAMIN C) 500 MG tablet Take 500 mg by mouth daily       aspirin (ASA) 81 MG chewable tablet Take 81 mg by mouth daily       cinnamon 500 MG TABS Take 1 tablet by mouth daily       FLAX OIL-FISH OIL-BORAGE OIL PO Take 1 capsule by mouth daily       hydrochlorothiazide (HYDRODIURIL) 25 MG tablet Take 25 mg by mouth daily       Insulin Aspart (NOVOLOG SC) Insulin pump       INSULIN PUMP - OUTPATIENT        levothyroxine (SYNTHROID/LEVOTHROID) 50 MCG tablet Take 75 mcg by mouth daily        lisinopril (PRINIVIL/ZESTRIL) 40 MG tablet Take 40 mg by mouth daily       metoprolol tartrate (LOPRESSOR) 50 MG tablet Take 50 mg by mouth 2 times daily (Ordered twice daily per MD, patient takes once daily, per patient MD negrete this, Pharmacy recommends succinate if only daily)        multivitamin, therapeutic with minerals (MULTI-VITAMIN) TABS tablet Take 1 tablet by mouth daily       rosuvastatin (CRESTOR) 10 MG tablet Take 10 mg by mouth daily       vitamin D3 (CHOLECALCIFEROL) 2000 units tablet Take 1 tablet by mouth daily       No Known Allergies    Reviewed and updated as needed this visit by clinical staff  Allergies  Meds       Reviewed and updated as needed this visit by Provider  Allergies  Meds         ROS:  CONSTITUTIONAL: NEGATIVE for fever, chills, change in weight  INTEGUMENTARY/SKIN: NEGATIVE for worrisome rashes, moles or lesions  ENT/MOUTH: NEGATIVE for ear, mouth and throat problems  RESP: NEGATIVE for significant cough or SOB  CV:  NEGATIVE for chest pain, palpitations or peripheral edema  GI: diarrhea with poor diet due to gallbladder removal   : negative for dysuria, hematuria, decreased urinary stream, erectile dysfunction  MUSCULOSKELETAL: arthritis in shoulders  NEURO: neuropathy in feet and hands  ENDOCRINE: Hx diabetes  PSYCHIATRIC: NEGATIVE for changes in mood or affect    OBJECTIVE:     /77   Pulse 84   Resp 16   Wt 101 kg (222 lb 9.6 oz)   SpO2 98%   BMI 31.94 kg/m    Body mass index is 31.94 kg/m .   GENERAL: healthy, alert and no distress  NECK: no adenopathy, no asymmetry, masses, or scars and thyroid normal to palpation  RESP: lungs clear to auscultation - no rales, rhonchi or wheezes  CV: regular rate and rhythm, normal S1 S2, no S3 or S4, no murmur, click or rub, no peripheral edema and peripheral pulses strong  ABDOMEN: soft, nontender, no hepatosplenomegaly, no masses and bowel sounds normal  SKIN: no suspicious lesions or rashes  PSYCH: mentation appears normal, affect normal/bright    Diagnostic Test Results:  Results for orders placed or performed in visit on 19   Cholesterol   Result Value Ref Range    Cholesterol 182 <200 mg/dL    HDL Cholesterol 64 >60 mg/dL    Triglyceride (External) 99 <150    LDL Cholesterol 98 <100 mg/dL   '    ASSESSMENT:       Findings:  Aneta. S      Date: 2019   Average Glucose: 237   Time in target: 28%  Time below:5%  Time above: 67%  Date: 2019   Average Glucose: 282   Time in target: 27%  Time below:0  Time above: 73%  Date: 2019   Average Glucose: 291   Time in target: 2%  Time below:0  Time above: 98%  Date: 2019   Average Glucose: 229   Time in target: 41%  Time below:0  Time above: 59%  Date: 2019   Average Glucose: 259   Time in target: 30%  Time below:4%  Time above: 66%  Date: 2019   Average Glucose: 213   Time in target: 46%  Time below:0  Time above: 54%  Date: 2019   Average Glucose: 238   Time in target: 19%  Time below:0   Time above: 81%  Date: 2019   Average Glucose: 314   Time in target: 16%  Time below:0  Time above: 84%  Date: 2019   Average Glucose: 381   Time in target: 5%  Time below:6%  Time above: 89%  Date: 2019   Average Glucose: 318   Time in target: 4%  Time below:0  Time above: 96%  Date: 2019   Average Glucose: 272   Time in target: 37%  Time below:0  Time above: 63%  Date: 2019   Average Glucose: 385   Time in target: 9%  Time below:2%  Time above: 89%  Date: 2019   Average Glucose: 312   Time in target: 0  Time below:0  Time above: 100%    Distribution Data:  Percentage above 180: 79%  Percentage within : 20%  Percentage below 70: 1%    Hypoglycemia incidence:  During the night or when giving a bolus and not eating.    Food choices/Carbohydrate counting:  Breakfast: Coffee with creamer  Lunch:  [place] or breakfast  Supper: Largest meal of the day  Before bed snack usually a carb    Exercise:  Limited due to weather    Recommendations:  When you have a low eat 15 grams of carbs, recheck in 15 minutes if still low (below 70) eat another 15 grams of carbs. Once above above 70 add protein     Sliding Scale  150-200  1 unit  201-250  2 units  251-300 3 units  301-350  4 units  351-400 5 units  >400  6 units      -follow sliding scale for bolus without eating  -when eating you should add bolus plus carbs    -add 1 unit with coffee and creamer beside sliding scale     Please check fast BG daily, before lunch, before supper, and 2 hours after supper    Please eat protein with any all carb meal or snack    Did make some adjustment to insulin pump     insulin pump download, which is as followed:    Insulin pump information:   Average: 44.4 +/- 5.0  Basal/bolus ratio: 26.9 (61%)/17.5 (39%)    Testin.3x/day    Hypoglycemia: 1%    Basal dose:  00:00 1.50  03:00 1.10  07:00 1.00  22:00 1.50    Adjusted the basal rate:  00:00 1.45  03:00 1.10  12:00 1.10  15:00 1.20  22:00 1.45    Decreased  overnight insulin and increased insulin from noon till 2200  Lab Results   Component Value Date    A1C 9.2 01/08/2019    A1C 11.4 10/06/2018         PLAN:   1. Uncontrolled type 1 diabetes mellitus with hyperglycemia (H)  Pump adjust done today. Reviewed CGM. Homer should keep appointment with Endocrinology Dr Jackson in a couple weeks. Also discussed decreasing morning bolusing, Homer was provided sliding scale for insulin again today. See AVS for additional suggestions.   - GLUCOSE MONITOR, 72 HOUR, PHYS INTERP  - GLUCOSE MONITORING CONTINUOUS, >=72 HR        Follow up in 2 months or as needed   Patient Instructions   Continue working on healthy eating and moving (start low and slow, work up to 30 min, 5x/week)    BG goals:  Fasting and before meals <130, >80  2 hour after eating <180    We only need 1/2 of these numbers to be within target then your A1c will be within target    Medication changes   -changes to pump    Recommendations:  When you have a low eat 15 grams of carbs, recheck in 15 minutes if still low (below 70) eat another 15 grams of carbs. Once above above 70 add protein     Sliding Scale  150-200  1 unit  201-250  2 units  251-300 3 units  301-350  4 units  351-400 5 units  >400  6 units      -follow sliding scale for bolus without eating  -when eating you should add bolus plus carbs    -add 1 unit with coffee and creamer beside sliding scale     Please check fast BG daily, before lunch, before supper, and 2 hours after supper    Please eat protein with any all carb meal or snack    Follow up   - months 2 months or as needed call with any questions or concerns      Call me sooner if any problems/concerns and/or questions develop including consistent low BGs <70 or consistent high BGs >200  [phone] (Unit Coordinator)    [phone] (Nurse)      ABBIE Berg [facility] [place]

## 2019-02-01 NOTE — PROGRESS NOTES
"Chief Complaint   Patient presents with     Diabetes       Initial /77   Pulse 84   Resp 16   Wt 101 kg (222 lb 9.6 oz)   SpO2 98%   BMI 31.94 kg/m   Estimated body mass index is 31.94 kg/m  as calculated from the following:    Height as of 1/21/19: 1.778 m (5' 10\").    Weight as of this encounter: 101 kg (222 lb 9.6 oz).  Medication Reconciliation: complete    Sheila Parada LPN    "

## 2019-02-22 ENCOUNTER — TRANSFERRED RECORDS (OUTPATIENT)
Dept: HEALTH INFORMATION MANAGEMENT | Facility: CLINIC | Age: 59
End: 2019-02-22

## 2019-03-20 ENCOUNTER — HOSPITAL ENCOUNTER (EMERGENCY)
Facility: HOSPITAL | Age: 59
Discharge: HOME | End: 2019-03-20
Attending: PHYSICIAN ASSISTANT | Admitting: PHYSICIAN ASSISTANT
Payer: MEDICARE

## 2019-03-20 VITALS
TEMPERATURE: 98.4 F | DIASTOLIC BLOOD PRESSURE: 103 MMHG | HEART RATE: 90 BPM | OXYGEN SATURATION: 96 % | RESPIRATION RATE: 16 BRPM | SYSTOLIC BLOOD PRESSURE: 190 MMHG

## 2019-03-20 DIAGNOSIS — E10.65 UNCONTROLLED TYPE 1 DIABETES MELLITUS WITH HYPERGLYCEMIA (H): ICD-10-CM

## 2019-03-20 DIAGNOSIS — I10 ESSENTIAL HYPERTENSION: ICD-10-CM

## 2019-03-20 DIAGNOSIS — R11.0 NAUSEA: ICD-10-CM

## 2019-03-20 DIAGNOSIS — R53.83 FATIGUE: ICD-10-CM

## 2019-03-20 LAB
ANION GAP SERPL CALCULATED.3IONS-SCNC: 7 MMOL/L (ref 3–14)
BUN SERPL-MCNC: 25 MG/DL (ref 7–30)
CALCIUM SERPL-MCNC: 9.3 MG/DL (ref 8.5–10.1)
CHLORIDE SERPL-SCNC: 103 MMOL/L (ref 94–109)
CO2 SERPL-SCNC: 28 MMOL/L (ref 20–32)
CREAT SERPL-MCNC: 1.4 MG/DL (ref 0.66–1.25)
ERYTHROCYTE [DISTWIDTH] IN BLOOD BY AUTOMATED COUNT: 12.3 % (ref 10–15)
EST. AVERAGE GLUCOSE BLD GHB EST-MCNC: 189 MG/DL
GFR SERPL CREATININE-BSD FRML MDRD: 56 ML/MIN/{1.73_M2}
GLUCOSE SERPL-MCNC: 183 MG/DL (ref 70–99)
HBA1C MFR BLD: 8.2 % (ref 0–5.6)
HCT VFR BLD AUTO: 44.2 % (ref 40–53)
HGB BLD-MCNC: 15.5 G/DL (ref 13.3–17.7)
MCH RBC QN AUTO: 30.8 PG (ref 26.5–33)
MCHC RBC AUTO-ENTMCNC: 35.1 G/DL (ref 31.5–36.5)
MCV RBC AUTO: 88 FL (ref 78–100)
PLATELET # BLD AUTO: 280 10E9/L (ref 150–450)
POTASSIUM SERPL-SCNC: 4.2 MMOL/L (ref 3.4–5.3)
RBC # BLD AUTO: 5.03 10E12/L (ref 4.4–5.9)
SODIUM SERPL-SCNC: 138 MMOL/L (ref 133–144)
TROPONIN I SERPL-MCNC: <0.015 UG/L (ref 0–0.04)
WBC # BLD AUTO: 7.8 10E9/L (ref 4–11)

## 2019-03-20 PROCEDURE — 40000788 ZZHCL STATISTIC ESTIMATED AVERAGE GLUCOSE: Performed by: PHYSICIAN ASSISTANT

## 2019-03-20 PROCEDURE — 93005 ELECTROCARDIOGRAM TRACING: CPT

## 2019-03-20 PROCEDURE — 99284 EMERGENCY DEPT VISIT MOD MDM: CPT | Mod: Z6 | Performed by: PHYSICIAN ASSISTANT

## 2019-03-20 PROCEDURE — 85027 COMPLETE CBC AUTOMATED: CPT | Performed by: PHYSICIAN ASSISTANT

## 2019-03-20 PROCEDURE — 36415 COLL VENOUS BLD VENIPUNCTURE: CPT | Performed by: PHYSICIAN ASSISTANT

## 2019-03-20 PROCEDURE — 80048 BASIC METABOLIC PNL TOTAL CA: CPT | Performed by: PHYSICIAN ASSISTANT

## 2019-03-20 PROCEDURE — 83036 HEMOGLOBIN GLYCOSYLATED A1C: CPT | Performed by: PHYSICIAN ASSISTANT

## 2019-03-20 PROCEDURE — 93010 ELECTROCARDIOGRAM REPORT: CPT | Performed by: INTERNAL MEDICINE

## 2019-03-20 PROCEDURE — 99284 EMERGENCY DEPT VISIT MOD MDM: CPT

## 2019-03-20 PROCEDURE — 84484 ASSAY OF TROPONIN QUANT: CPT | Performed by: PHYSICIAN ASSISTANT

## 2019-03-20 ASSESSMENT — ENCOUNTER SYMPTOMS
NUMBNESS: 0
FEVER: 0
VOMITING: 0
WEAKNESS: 0
FACIAL ASYMMETRY: 0
SPEECH DIFFICULTY: 0
EYES NEGATIVE: 1
SHORTNESS OF BREATH: 0
FATIGUE: 1
DIARRHEA: 1
NAUSEA: 1
PALPITATIONS: 0
ABDOMINAL DISTENTION: 0
DIZZINESS: 1
HEADACHES: 0
BLOOD IN STOOL: 0
ABDOMINAL PAIN: 0
LIGHT-HEADEDNESS: 1
CHEST TIGHTNESS: 0

## 2019-03-20 NOTE — ED NOTES
The patient presents via [facility] from his home with report of onset of generalized weakness since Sunday, nausea, and today he states he has been weak and had dry heaving. He wears an insulin pump and states he changed all his tubings and needle and placement in case it was not working correctly, even though he had no signs of it not working properly. The patient reported dizziness prior to arrival when in the bathroom and states he fell and then called 911. He denies injuring himself. He reports his daughter is aware of his ED visit.

## 2019-03-20 NOTE — ED AVS SNAPSHOT
[facility]  [address] [address]  Phone:  [phone]                                    Homer Triana   MRN: [id number]    Department:  [facility]   Date of Visit:  3/20/2019           After Visit Summary Signature Page    I have received my discharge instructions, and my questions have been answered. I have discussed any challenges I see with this plan with the nurse or doctor.    ..........................................................................................................................................  Patient/Patient Representative Signature      ..........................................................................................................................................  Patient Representative Print Name and Relationship to Patient    ..................................................               ................................................  Date                                   Time    ..........................................................................................................................................  Reviewed by Signature/Title    ...................................................              ..............................................  Date                                               Time          22EPIC Rev 08/18

## 2019-03-20 NOTE — ED PROVIDER NOTES
"  History     Chief Complaint   Patient presents with     Nausea     History of diabetes, not feeling well today with nausea.     Generalized Weakness     HPI  Homer Triana is a 58 year old male who has a history of poorly controlled diabetes and presents to the emergency department today with 4 days of experiencing a sense of nausea and what he describes as \"abdominal\" dizziness that starts in the gut and moves upward toward his head.  He has been generally fatigued but denies chest pain, shortness of breath, cough, fever, abdominal pain.  He has a history of hypertension which has been reasonably well controlled.  Patient states he was hospitalized in November for diabetic ketoacidosis.  Diabetes is currently controlled with an insulin pump.  Patient admits to significant deconditioning as he states he only walks from his couch to his bed on a daily basis outside of his normal work activity.      Allergies:  No Known Allergies    Problem List:    Patient Active Problem List    Diagnosis Date Noted     Uncontrolled type 1 diabetes mellitus with hyperglycemia (H) 01/18/2019     Priority: Medium     DKA (diabetic ketoacidosis) (H) 11/22/2018     Priority: Medium     Uncontrolled type 2 DM with hyperosmolar nonketotic hyperglycemia (H) 10/06/2018     Priority: Medium     Syncope, unspecified syncope type 10/06/2018     Priority: Medium     Diabetic neuropathy (H) 10/06/2018     Priority: Medium     Back pain 10/06/2018     Priority: Medium     Generalized muscle weakness 10/06/2018     Priority: Medium     Essential hypertension 10/06/2018     Priority: Medium     Acute kidney injury (H) 10/06/2018     Priority: Medium     Uncontrolled type 2 diabetes mellitus with hyperosmolar nonketotic hyperglycemia (H) 10/06/2018     Priority: Medium        Past Medical History:    Past Medical History:   Diagnosis Date     Diabetes (H)      Diabetic peripheral neuropathy (H)      Hyperlipidemia      Hypertension      " Hypothyroid        Past Surgical History:    Past Surgical History:   Procedure Laterality Date     CHOLECYSTECTOMY       COLONOSCOPY - HIM SCAN  05/12/2015    Colonoscopy, Dr Huber 5/12/2015       Family History:    No family history on file.    Social History:  Marital Status:  Single [1]  Social History     Tobacco Use     Smoking status: Former Smoker     Smokeless tobacco: Never Used   Substance Use Topics     Alcohol use: Yes     Comment: moderately     Drug use: No        Medications:      ascorbic acid (VITAMIN C) 500 MG tablet   aspirin (ASA) 81 MG chewable tablet   cinnamon 500 MG TABS   FLAX OIL-FISH OIL-BORAGE OIL PO   hydrochlorothiazide (HYDRODIURIL) 25 MG tablet   Insulin Aspart (NOVOLOG SC)   INSULIN PUMP - OUTPATIENT   levothyroxine (SYNTHROID/LEVOTHROID) 50 MCG tablet   lisinopril (PRINIVIL/ZESTRIL) 40 MG tablet   metoprolol tartrate (LOPRESSOR) 50 MG tablet   multivitamin, therapeutic with minerals (MULTI-VITAMIN) TABS tablet   rosuvastatin (CRESTOR) 10 MG tablet   vitamin D3 (CHOLECALCIFEROL) 2000 units tablet         Review of Systems   Constitutional: Positive for fatigue. Negative for fever.   HENT: Positive for congestion.    Eyes: Negative.    Respiratory: Negative for chest tightness and shortness of breath.    Cardiovascular: Negative for chest pain and palpitations.   Gastrointestinal: Positive for diarrhea (History of loose stools since cholecystectomy.) and nausea. Negative for abdominal distention, abdominal pain, blood in stool and vomiting.   Genitourinary: Negative.    Skin:        Positive for slow healing wound on left lateral ankle.   Neurological: Positive for dizziness and light-headedness. Negative for syncope, facial asymmetry, speech difficulty, weakness, numbness and headaches.       Physical Exam   BP: (!) 204/110  Pulse: 90  Heart Rate: 83  Temp: 98.8  F (37.1  C)  Resp: 18  SpO2: 95 %      Physical Exam   Constitutional: He is oriented to person, place, and time. He  appears well-developed and well-nourished. No distress.   HENT:   Head: Normocephalic and atraumatic.   Eyes: Conjunctivae and EOM are normal. Pupils are equal, round, and reactive to light.   Neck: Normal range of motion. Neck supple.   Cardiovascular: Normal rate, regular rhythm, normal heart sounds and intact distal pulses.   Pulmonary/Chest: Effort normal and breath sounds normal.   Abdominal: Soft. Bowel sounds are normal. There is no tenderness. There is no guarding.   Musculoskeletal: Normal range of motion.   Neurological: He is alert and oriented to person, place, and time.   Skin: Skin is warm and dry. He is not diaphoretic.   Psychiatric: He has a normal mood and affect. His behavior is normal. Judgment and thought content normal.       ED Course        Procedures                EKG Interpretation:      Interpreted by Kieran Staples  Time reviewed: 1710  Symptoms at time of EKG: nausea, dizziness   Rhythm: normal sinus   Rate: normal  Axis: normal  Ectopy: none  Conduction: normal  ST Segments/ T Waves: No ST-T wave changes  Q Waves: none    Clinical Impression: normal EKG      Results for orders placed or performed during the hospital encounter of 03/20/19 (from the past 24 hour(s))   Basic metabolic panel   Result Value Ref Range    Sodium 138 133 - 144 mmol/L    Potassium 4.2 3.4 - 5.3 mmol/L    Chloride 103 94 - 109 mmol/L    Carbon Dioxide 28 20 - 32 mmol/L    Anion Gap 7 3 - 14 mmol/L    Glucose 183 (H) 70 - 99 mg/dL    Urea Nitrogen 25 7 - 30 mg/dL    Creatinine 1.40 (H) 0.66 - 1.25 mg/dL    GFR Estimate 56 (L) >60 mL/min/[1.73_m2]    GFR Estimate If Black 64 >60 mL/min/[1.73_m2]    Calcium 9.3 8.5 - 10.1 mg/dL   Troponin I   Result Value Ref Range    Troponin I ES <0.015 0.000 - 0.045 ug/L   Hemoglobin A1c   Result Value Ref Range    Hemoglobin A1C 8.2 (H) 0 - 5.6 %   CBC with platelets   Result Value Ref Range    WBC 7.8 4.0 - 11.0 10e9/L    RBC Count 5.03 4.4 - 5.9 10e12/L    Hemoglobin 15.5  13.3 - 17.7 g/dL    Hematocrit 44.2 40.0 - 53.0 %    MCV 88 78 - 100 fl    MCH 30.8 26.5 - 33.0 pg    MCHC 35.1 31.5 - 36.5 g/dL    RDW 12.3 10.0 - 15.0 %    Platelet Count 280 150 - 450 10e9/L   Estimated Average Glucose   Result Value Ref Range    Estimated Average Glucose 189 mg/dL       Medications - No data to display    Assessments & Plan (with Medical Decision Making)     I have reviewed the nursing notes.    I have reviewed the findings, diagnosis, plan and need for follow up with the patient.  No evidence of acute MI with normal ECG, negative troponin with greater than 6 hours of symptoms.  No abdominal pain requiring further workup.  Recommended fluids and continue to monitor glucose closely as this may trigger symptoms.  Discussed symptoms that require immediate follow-up in the emergency department including new onset chest pain, shortness of breath, lightheadedness, significant abdominal pain.  Recommend patient follow-up with primary care provider regarding obtaining better control of blood pressure.       Medication List      There are no discharge medications for this visit.         Final diagnoses:   Fatigue   Nausea   Uncontrolled type 1 diabetes mellitus with hyperglycemia (H)   Essential hypertension     SITA Khanna on 3/20/2019 at 11:54 PM   3/20/2019   [facility]      Kieran Staples PA  03/20/19 0430

## 2019-03-21 NOTE — ED NOTES
The patient is ready for discharge and verbalized understanding for follow up. He was given a copy of his lab work for his follow up appointment. He called [facility] for a ride home. Discharged ambulatory with steady gait and balance.

## 2021-05-26 ENCOUNTER — TRANSFERRED RECORDS (OUTPATIENT)
Dept: HEALTH INFORMATION MANAGEMENT | Facility: CLINIC | Age: 61
End: 2021-05-26

## 2021-05-26 LAB — RETINOPATHY: POSITIVE

## 2021-08-25 ENCOUNTER — TRANSFERRED RECORDS (OUTPATIENT)
Dept: HEALTH INFORMATION MANAGEMENT | Facility: CLINIC | Age: 61
End: 2021-08-25

## 2021-09-02 ENCOUNTER — TRANSFERRED RECORDS (OUTPATIENT)
Dept: HEALTH INFORMATION MANAGEMENT | Facility: CLINIC | Age: 61
End: 2021-09-02

## 2021-09-09 ENCOUNTER — TRANSFERRED RECORDS (OUTPATIENT)
Dept: HEALTH INFORMATION MANAGEMENT | Facility: CLINIC | Age: 61
End: 2021-09-09

## 2021-09-09 LAB
ALBUMIN (EXTERNAL): 4 G/DL (ref 3.5–5)
ALKALINE PHOSPHATASE (EXTERNAL): 96 U/L (ref 45–122)
ALT SERPL-CCNC: 39 U/L (ref 18–65)
ALT SERPL-CCNC: 39 U/L (ref 18–65)
ANION GAP SERPL CALC-SCNC: 5 MMOL/L (ref 5–15)
AST SERPL-CCNC: 26 U/L (ref 10–40)
AST SERPL-CCNC: 26 U/L (ref 10–40)
BILIRUB SERPL-MCNC: 0.6 MG/DL (ref 0.2–1)
BUN SERPL-MCNC: 29 MG/DL (ref 8–23)
CALCIUM (EXTERNAL): 8.6 MG/DL (ref 8.5–10.5)
CHLORIDE (EXTERNAL): 107 MMOL/L (ref 98–107)
CO2 (EXTERNAL): 30 MMOL/L (ref 23–32)
CREATININE (EXTERNAL): 1.49 MG/DL (ref 0.8–1.5)
GFR ESTIMATED (EXTERNAL): 48 ML/MIN/1.73M2
GFR ESTIMATED (IF AFRICAN AMERICAN) (EXTERNAL): ABNORMAL ML/MIN/1.73M2
GLUCOSE (EXTERNAL): 270 MG/DL (ref 60–99)
HBA1C MFR BLD: 8.1 %
POTASSIUM (EXTERNAL): 4.7 MMOL/L (ref 3.5–5.1)
PROTEIN TOTAL (EXTERNAL): 6 G/DL (ref 6–8)
SODIUM (EXTERNAL): 142 MMOL/L (ref 136–145)
TSH SERPL-ACNC: 8.32 MIU/ML (ref 0.35–4.8)

## 2021-09-16 ENCOUNTER — APPOINTMENT (OUTPATIENT)
Dept: CT IMAGING | Facility: HOSPITAL | Age: 61
DRG: 917 | End: 2021-09-16
Attending: EMERGENCY MEDICINE
Payer: MEDICARE

## 2021-09-16 ENCOUNTER — APPOINTMENT (OUTPATIENT)
Dept: GENERAL RADIOLOGY | Facility: HOSPITAL | Age: 61
DRG: 917 | End: 2021-09-16
Attending: EMERGENCY MEDICINE
Payer: MEDICARE

## 2021-09-16 ENCOUNTER — HOSPITAL ENCOUNTER (INPATIENT)
Facility: HOSPITAL | Age: 61
LOS: 2 days | Discharge: HOME | DRG: 917 | End: 2021-09-18
Attending: EMERGENCY MEDICINE | Admitting: HOSPITALIST
Payer: MEDICARE

## 2021-09-16 DIAGNOSIS — E16.2 ACUTE METABOLIC ENCEPHALOPATHY DUE TO HYPOGLYCEMIA: ICD-10-CM

## 2021-09-16 DIAGNOSIS — T68.XXXA HYPOTHERMIA, INITIAL ENCOUNTER: ICD-10-CM

## 2021-09-16 DIAGNOSIS — G93.41 ACUTE METABOLIC ENCEPHALOPATHY DUE TO HYPOGLYCEMIA: ICD-10-CM

## 2021-09-16 DIAGNOSIS — E87.20 ACIDOSIS: ICD-10-CM

## 2021-09-16 PROBLEM — N17.9 ACUTE KIDNEY INJURY (H): Status: ACTIVE | Noted: 2018-10-06

## 2021-09-16 PROBLEM — E11.40 DIABETIC NEUROPATHY (H): Status: ACTIVE | Noted: 2018-10-06

## 2021-09-16 LAB
ALBUMIN SERPL-MCNC: 3.3 G/DL (ref 3.4–5)
ALBUMIN UR-MCNC: 20 MG/DL
ALP SERPL-CCNC: 96 U/L (ref 40–150)
ALT SERPL W P-5'-P-CCNC: 37 U/L (ref 0–70)
ANION GAP SERPL CALCULATED.3IONS-SCNC: 7 MMOL/L (ref 3–14)
APPEARANCE UR: CLEAR
AST SERPL W P-5'-P-CCNC: 23 U/L (ref 0–45)
BACTERIA #/AREA URNS HPF: ABNORMAL /HPF
BASE EXCESS BLDA CALC-SCNC: -6.4 MMOL/L (ref -9–1.8)
BASE EXCESS BLDV CALC-SCNC: -4.5 MMOL/L (ref -7.7–1.9)
BASE EXCESS BLDV CALC-SCNC: -5 MMOL/L (ref -7.7–1.9)
BASOPHILS # BLD AUTO: 0 10E3/UL (ref 0–0.2)
BASOPHILS NFR BLD AUTO: 0 %
BILIRUB SERPL-MCNC: 0.5 MG/DL (ref 0.2–1.3)
BILIRUB UR QL STRIP: NEGATIVE
BUN SERPL-MCNC: 44 MG/DL (ref 7–30)
CALCIUM SERPL-MCNC: 8.8 MG/DL (ref 8.5–10.1)
CHLORIDE BLD-SCNC: 105 MMOL/L (ref 94–109)
CO2 SERPL-SCNC: 23 MMOL/L (ref 20–32)
COLOR UR AUTO: ABNORMAL
CREAT SERPL-MCNC: 1.76 MG/DL (ref 0.66–1.25)
CREAT SERPL-MCNC: 1.9 MG/DL (ref 0.66–1.25)
EOSINOPHIL # BLD AUTO: 0 10E3/UL (ref 0–0.7)
EOSINOPHIL NFR BLD AUTO: 0 %
ERYTHROCYTE [DISTWIDTH] IN BLOOD BY AUTOMATED COUNT: 11.9 % (ref 10–15)
GFR SERPL CREATININE-BSD FRML MDRD: 38 ML/MIN/1.73M2
GFR SERPL CREATININE-BSD FRML MDRD: 41 ML/MIN/1.73M2
GLUCOSE BLD-MCNC: 185 MG/DL (ref 70–99)
GLUCOSE BLDC GLUCOMTR-MCNC: 152 MG/DL (ref 70–99)
GLUCOSE BLDC GLUCOMTR-MCNC: 196 MG/DL (ref 70–99)
GLUCOSE BLDC GLUCOMTR-MCNC: 273 MG/DL (ref 70–99)
GLUCOSE BLDC GLUCOMTR-MCNC: 314 MG/DL (ref 70–99)
GLUCOSE BLDC GLUCOMTR-MCNC: 350 MG/DL (ref 70–99)
GLUCOSE BLDC GLUCOMTR-MCNC: 357 MG/DL (ref 70–99)
GLUCOSE UR STRIP-MCNC: NEGATIVE MG/DL
HCO3 BLD-SCNC: 20 MMOL/L (ref 21–28)
HCO3 BLDV-SCNC: 23 MMOL/L (ref 21–28)
HCO3 BLDV-SCNC: 24 MMOL/L (ref 21–28)
HCT VFR BLD AUTO: 44.7 % (ref 40–53)
HGB BLD-MCNC: 15.5 G/DL (ref 13.3–17.7)
HGB UR QL STRIP: NEGATIVE
HYALINE CASTS: 21 /LPF
IMM GRANULOCYTES # BLD: 0 10E3/UL
IMM GRANULOCYTES NFR BLD: 0 %
INR PPP: 0.92 (ref 0.85–1.15)
KETONES UR STRIP-MCNC: NEGATIVE MG/DL
LACTATE SERPL-SCNC: 1.6 MMOL/L (ref 0.7–2)
LEUKOCYTE ESTERASE UR QL STRIP: NEGATIVE
LIPASE SERPL-CCNC: 42 U/L (ref 73–393)
LYMPHOCYTES # BLD AUTO: 0.7 10E3/UL (ref 0.8–5.3)
LYMPHOCYTES NFR BLD AUTO: 7 %
MAGNESIUM SERPL-MCNC: 2.2 MG/DL (ref 1.6–2.3)
MCH RBC QN AUTO: 31.4 PG (ref 26.5–33)
MCHC RBC AUTO-ENTMCNC: 34.7 G/DL (ref 31.5–36.5)
MCV RBC AUTO: 91 FL (ref 78–100)
MONOCYTES # BLD AUTO: 0.7 10E3/UL (ref 0–1.3)
MONOCYTES NFR BLD AUTO: 7 %
MUCOUS THREADS #/AREA URNS LPF: PRESENT /LPF
NEUTROPHILS # BLD AUTO: 7.8 10E3/UL (ref 1.6–8.3)
NEUTROPHILS NFR BLD AUTO: 86 %
NITRATE UR QL: NEGATIVE
NRBC # BLD AUTO: 0 10E3/UL
NRBC BLD AUTO-RTO: 0 /100
NT-PROBNP SERPL-MCNC: 296 PG/ML (ref 0–900)
O2/TOTAL GAS SETTING VFR VENT: 0 %
O2/TOTAL GAS SETTING VFR VENT: 0 %
O2/TOTAL GAS SETTING VFR VENT: 21 %
OXYHGB MFR BLD: 91 % (ref 92–100)
OXYHGB MFR BLDV: 51 % (ref 70–75)
OXYHGB MFR BLDV: 57 % (ref 70–75)
PCO2 BLD: 42 MM HG (ref 35–45)
PCO2 BLDV: 52 MM HG (ref 40–50)
PCO2 BLDV: 59 MM HG (ref 40–50)
PH BLD: 7.29 [PH] (ref 7.35–7.45)
PH BLDV: 7.22 [PH] (ref 7.32–7.43)
PH BLDV: 7.26 [PH] (ref 7.32–7.43)
PH UR STRIP: 5 [PH] (ref 4.7–8)
PLATELET # BLD AUTO: 229 10E3/UL (ref 150–450)
PO2 BLD: 62 MM HG (ref 80–105)
PO2 BLDV: 29 MM HG (ref 25–47)
PO2 BLDV: 30 MM HG (ref 25–47)
POTASSIUM BLD-SCNC: 4.4 MMOL/L (ref 3.4–5.3)
PROT SERPL-MCNC: 7.1 G/DL (ref 6.8–8.8)
RBC # BLD AUTO: 4.93 10E6/UL (ref 4.4–5.9)
RBC URINE: 3 /HPF
SARS-COV-2 RNA RESP QL NAA+PROBE: NEGATIVE
SODIUM SERPL-SCNC: 135 MMOL/L (ref 133–144)
SP GR UR STRIP: 1.01 (ref 1–1.03)
SPERM #/AREA URNS HPF: PRESENT /HPF
SQUAMOUS EPITHELIAL: 0 /HPF
TROPONIN I SERPL-MCNC: <0.015 UG/L (ref 0–0.04)
TSH SERPL DL<=0.005 MIU/L-ACNC: 3.52 MU/L (ref 0.4–4)
UROBILINOGEN UR STRIP-MCNC: NORMAL MG/DL
WBC # BLD AUTO: 9.3 10E3/UL (ref 4–11)
WBC URINE: 2 /HPF

## 2021-09-16 PROCEDURE — 83735 ASSAY OF MAGNESIUM: CPT | Performed by: EMERGENCY MEDICINE

## 2021-09-16 PROCEDURE — 258N000003 HC RX IP 258 OP 636: Performed by: EMERGENCY MEDICINE

## 2021-09-16 PROCEDURE — 36415 COLL VENOUS BLD VENIPUNCTURE: CPT | Performed by: EMERGENCY MEDICINE

## 2021-09-16 PROCEDURE — 258N000003 HC RX IP 258 OP 636: Performed by: HOSPITALIST

## 2021-09-16 PROCEDURE — 84484 ASSAY OF TROPONIN QUANT: CPT | Performed by: EMERGENCY MEDICINE

## 2021-09-16 PROCEDURE — 82565 ASSAY OF CREATININE: CPT | Performed by: HOSPITALIST

## 2021-09-16 PROCEDURE — 82805 BLOOD GASES W/O2 SATURATION: CPT | Performed by: EMERGENCY MEDICINE

## 2021-09-16 PROCEDURE — 85610 PROTHROMBIN TIME: CPT | Performed by: EMERGENCY MEDICINE

## 2021-09-16 PROCEDURE — 83605 ASSAY OF LACTIC ACID: CPT | Performed by: EMERGENCY MEDICINE

## 2021-09-16 PROCEDURE — 99285 EMERGENCY DEPT VISIT HI MDM: CPT | Mod: 25

## 2021-09-16 PROCEDURE — 96360 HYDRATION IV INFUSION INIT: CPT

## 2021-09-16 PROCEDURE — 36600 WITHDRAWAL OF ARTERIAL BLOOD: CPT

## 2021-09-16 PROCEDURE — 81001 URINALYSIS AUTO W/SCOPE: CPT | Performed by: EMERGENCY MEDICINE

## 2021-09-16 PROCEDURE — C9803 HOPD COVID-19 SPEC COLLECT: HCPCS

## 2021-09-16 PROCEDURE — 84443 ASSAY THYROID STIM HORMONE: CPT | Performed by: EMERGENCY MEDICINE

## 2021-09-16 PROCEDURE — 93005 ELECTROCARDIOGRAM TRACING: CPT

## 2021-09-16 PROCEDURE — 71045 X-RAY EXAM CHEST 1 VIEW: CPT

## 2021-09-16 PROCEDURE — 200N000001 HC R&B ICU

## 2021-09-16 PROCEDURE — 36415 COLL VENOUS BLD VENIPUNCTURE: CPT | Performed by: HOSPITALIST

## 2021-09-16 PROCEDURE — 96361 HYDRATE IV INFUSION ADD-ON: CPT

## 2021-09-16 PROCEDURE — 83690 ASSAY OF LIPASE: CPT | Performed by: EMERGENCY MEDICINE

## 2021-09-16 PROCEDURE — 99223 1ST HOSP IP/OBS HIGH 75: CPT | Mod: AI | Performed by: HOSPITALIST

## 2021-09-16 PROCEDURE — 250N000009 HC RX 250: Performed by: HOSPITALIST

## 2021-09-16 PROCEDURE — 250N000012 HC RX MED GY IP 250 OP 636 PS 637: Performed by: HOSPITALIST

## 2021-09-16 PROCEDURE — U0003 INFECTIOUS AGENT DETECTION BY NUCLEIC ACID (DNA OR RNA); SEVERE ACUTE RESPIRATORY SYNDROME CORONAVIRUS 2 (SARS-COV-2) (CORONAVIRUS DISEASE [COVID-19]), AMPLIFIED PROBE TECHNIQUE, MAKING USE OF HIGH THROUGHPUT TECHNOLOGIES AS DESCRIBED BY CMS-2020-01-R: HCPCS | Performed by: EMERGENCY MEDICINE

## 2021-09-16 PROCEDURE — 85025 COMPLETE CBC W/AUTO DIFF WBC: CPT | Performed by: EMERGENCY MEDICINE

## 2021-09-16 PROCEDURE — 80053 COMPREHEN METABOLIC PANEL: CPT | Performed by: EMERGENCY MEDICINE

## 2021-09-16 PROCEDURE — 93010 ELECTROCARDIOGRAM REPORT: CPT | Performed by: INTERNAL MEDICINE

## 2021-09-16 PROCEDURE — 99291 CRITICAL CARE FIRST HOUR: CPT | Performed by: EMERGENCY MEDICINE

## 2021-09-16 PROCEDURE — 250N000013 HC RX MED GY IP 250 OP 250 PS 637: Performed by: HOSPITALIST

## 2021-09-16 PROCEDURE — 250N000011 HC RX IP 250 OP 636: Performed by: HOSPITALIST

## 2021-09-16 PROCEDURE — 83880 ASSAY OF NATRIURETIC PEPTIDE: CPT | Performed by: EMERGENCY MEDICINE

## 2021-09-16 PROCEDURE — 70450 CT HEAD/BRAIN W/O DYE: CPT | Mod: ME

## 2021-09-16 RX ORDER — DEXTROSE MONOHYDRATE 25 G/50ML
25-50 INJECTION, SOLUTION INTRAVENOUS
Status: DISCONTINUED | OUTPATIENT
Start: 2021-09-16 | End: 2021-09-18 | Stop reason: HOSPADM

## 2021-09-16 RX ORDER — DORZOLAMIDE HCL 20 MG/ML
1 SOLUTION/ DROPS OPHTHALMIC 3 TIMES DAILY
Status: DISCONTINUED | OUTPATIENT
Start: 2021-09-16 | End: 2021-09-18 | Stop reason: HOSPADM

## 2021-09-16 RX ORDER — LISINOPRIL 40 MG/1
40 TABLET ORAL EVERY EVENING
Status: DISCONTINUED | OUTPATIENT
Start: 2021-09-16 | End: 2021-09-18 | Stop reason: HOSPADM

## 2021-09-16 RX ORDER — ONDANSETRON 4 MG/1
4 TABLET, ORALLY DISINTEGRATING ORAL EVERY 6 HOURS PRN
Status: DISCONTINUED | OUTPATIENT
Start: 2021-09-16 | End: 2021-09-18 | Stop reason: HOSPADM

## 2021-09-16 RX ORDER — BRIMONIDINE TARTRATE, TIMOLOL MALEATE 2; 5 MG/ML; MG/ML
1 SOLUTION/ DROPS OPHTHALMIC 2 TIMES DAILY
Status: ON HOLD | COMMUNITY
Start: 2021-04-01 | End: 2024-07-05

## 2021-09-16 RX ORDER — DORZOLAMIDE HCL 20 MG/ML
1 SOLUTION/ DROPS OPHTHALMIC 2 TIMES DAILY
COMMUNITY
Start: 2021-05-04

## 2021-09-16 RX ORDER — SODIUM CHLORIDE 9 MG/ML
INJECTION, SOLUTION INTRAVENOUS CONTINUOUS
Status: DISCONTINUED | OUTPATIENT
Start: 2021-09-16 | End: 2021-09-18 | Stop reason: HOSPADM

## 2021-09-16 RX ORDER — LIDOCAINE 40 MG/G
CREAM TOPICAL
Status: DISCONTINUED | OUTPATIENT
Start: 2021-09-16 | End: 2021-09-18 | Stop reason: HOSPADM

## 2021-09-16 RX ORDER — TIMOLOL MALEATE 5 MG/ML
1 SOLUTION/ DROPS OPHTHALMIC 2 TIMES DAILY
Status: ON HOLD | COMMUNITY
End: 2021-09-16

## 2021-09-16 RX ORDER — BLOOD SUGAR DIAGNOSTIC
1 STRIP MISCELLANEOUS 4 TIMES DAILY
COMMUNITY

## 2021-09-16 RX ORDER — GLUCAGON 3 MG/1
3 POWDER NASAL
Status: ON HOLD | COMMUNITY
Start: 2021-09-02 | End: 2024-07-05

## 2021-09-16 RX ORDER — METOPROLOL TARTRATE 50 MG
50 TABLET ORAL 2 TIMES DAILY
Status: DISCONTINUED | OUTPATIENT
Start: 2021-09-16 | End: 2021-09-18 | Stop reason: HOSPADM

## 2021-09-16 RX ORDER — BRIMONIDINE TARTRATE AND TIMOLOL MALEATE 2; 5 MG/ML; MG/ML
1 SOLUTION OPHTHALMIC 2 TIMES DAILY
Status: DISCONTINUED | OUTPATIENT
Start: 2021-09-16 | End: 2021-09-18 | Stop reason: HOSPADM

## 2021-09-16 RX ORDER — NICOTINE POLACRILEX 4 MG
15-30 LOZENGE BUCCAL
Status: DISCONTINUED | OUTPATIENT
Start: 2021-09-16 | End: 2021-09-18 | Stop reason: HOSPADM

## 2021-09-16 RX ORDER — ROSUVASTATIN CALCIUM 10 MG/1
10 TABLET, COATED ORAL EVERY EVENING
Status: DISCONTINUED | OUTPATIENT
Start: 2021-09-16 | End: 2021-09-18 | Stop reason: HOSPADM

## 2021-09-16 RX ORDER — HYDROCHLOROTHIAZIDE 25 MG/1
25 TABLET ORAL EVERY EVENING
Status: DISCONTINUED | OUTPATIENT
Start: 2021-09-16 | End: 2021-09-18 | Stop reason: HOSPADM

## 2021-09-16 RX ORDER — BLOOD-GLUCOSE METER
EACH MISCELLANEOUS 4 TIMES DAILY
COMMUNITY

## 2021-09-16 RX ORDER — ONDANSETRON 2 MG/ML
4 INJECTION INTRAMUSCULAR; INTRAVENOUS EVERY 6 HOURS PRN
Status: DISCONTINUED | OUTPATIENT
Start: 2021-09-16 | End: 2021-09-18 | Stop reason: HOSPADM

## 2021-09-16 RX ADMIN — DORZOLAMIDE HYDROCHLORIDE 1 DROP: 20 SOLUTION/ DROPS OPHTHALMIC at 20:41

## 2021-09-16 RX ADMIN — LEVOTHYROXINE SODIUM 137 MCG: 0.11 TABLET ORAL at 20:55

## 2021-09-16 RX ADMIN — ROSUVASTATIN 10 MG: 10 TABLET, FILM COATED ORAL at 20:55

## 2021-09-16 RX ADMIN — HYDROCHLOROTHIAZIDE 25 MG: 25 TABLET ORAL at 20:56

## 2021-09-16 RX ADMIN — LISINOPRIL 40 MG: 40 TABLET ORAL at 20:56

## 2021-09-16 RX ADMIN — DORZOLAMIDE HYDROCHLORIDE 1 DROP: 20 SOLUTION/ DROPS OPHTHALMIC at 17:01

## 2021-09-16 RX ADMIN — ENOXAPARIN SODIUM 40 MG: 40 INJECTION SUBCUTANEOUS at 16:59

## 2021-09-16 RX ADMIN — METOPROLOL TARTRATE 50 MG: 50 TABLET, FILM COATED ORAL at 20:56

## 2021-09-16 RX ADMIN — BRIMONIDINE TARTRATE, TIMOLOL MALEATE 1 DROP: 2; 5 SOLUTION/ DROPS OPHTHALMIC at 20:48

## 2021-09-16 RX ADMIN — SODIUM CHLORIDE: 9 INJECTION, SOLUTION INTRAVENOUS at 15:36

## 2021-09-16 RX ADMIN — DEXTROSE AND SODIUM CHLORIDE: 5; 900 INJECTION, SOLUTION INTRAVENOUS at 09:43

## 2021-09-16 RX ADMIN — INSULIN ASPART 2 UNITS: 100 INJECTION, SOLUTION INTRAVENOUS; SUBCUTANEOUS at 16:59

## 2021-09-16 ASSESSMENT — ACTIVITIES OF DAILY LIVING (ADL)
ADLS_ACUITY_SCORE: 11
ADLS_ACUITY_SCORE: 9

## 2021-09-16 ASSESSMENT — ENCOUNTER SYMPTOMS
MUSCULOSKELETAL NEGATIVE: 1
PALPITATIONS: 1
CONSTITUTIONAL NEGATIVE: 1
GASTROINTESTINAL NEGATIVE: 1
RESPIRATORY NEGATIVE: 1
WEAKNESS: 1

## 2021-09-16 ASSESSMENT — MIFFLIN-ST. JEOR: SCORE: 1990.25

## 2021-09-16 NOTE — H&P
[facility]    History and Physical - Hospitalist Service       Date of Admission:  9/16/2021    Assessment & Plan         Homer Triana is a 61 year old male admitted on 9/16/2021.        Acute metabolic encephalopathy due to hypoglycemia (9/16/2021)   Found to have glucose 50 at home, EMS gave dextrose., has insulin pump but has poor vision and has been having difficulty giving boluses but can use magnifying glass. Patient sugar was repated and normal. He was continued on insulin pump, no blouses.  -OK for insulin pump to continue basal rate  -Will use ISS only  NO BOLUSES.   -Patient will not give himself any insulin   -POCT glucose with meals  -Diabetic diet  -Hypoglycemia protocol.    Hypothermia, initial encounter (9/16/2021)     Acute kidney injury (H) (10/6/2018)    Acidosis (9/16/2021)    Acute metabolic encephalopathy due to hypoglycemia (9/16/2021)   Initial temp in ER 92F, patient has shivering and chills with previous hypoglycemia. Improved with bear huger  - Will hydrate with IV fluids  -Monitor Vitals in the ICU over the next 24 hours.      Diabetic neuropathy (H) (10/6/2018)  -Out patient follow up  -May benefit from gabapentin      Essential hypertension (10/6/2018)  -continue home medications       Diet: Consistent Carbohydrate Diet Consistent Carb 45 grams CHO per Meal Diet    DVT Prophylaxis: Enoxaparin (Lovenox) SQ  Kimball Catheter: Not present  Central Lines: None  Code Status: Full Code      Clinically Significant Risk Factors Present on Admission      Disposition Plan   Expected discharge: 1-2 days recommended to prior living arrangement once imporovement in sugars and normal functioning insulin pump and hypothermia resolution .     The patient's care was discussed with the Bedside Nurse and Patient.    Sunny Sanchez, [facility]  Securely message with the Vocera Web Console (learn more here)  Text page via [employer]  Paging/Directory      ______________________________________________________________________    Chief Complaint      HypoGlycemia    History is obtained from the patient    History of Present Illness   Homer Triana is a 61 year old male who  is a type I diabetic with insulin pump, patient has a history of glaucoma and decreased vision.  Patient does see ophthalmology and has a retinologist.  Patient does have issues with using his insulin pump at times as he has to use a magnifying glass to give himself boluses.  Patient last night was in the dark and he stated that he used his insulin pump and started feel chills and he started yelling for help.  In the morning today patient was found by the meter elio, ambulance was called and patient was found to have glucose of 50 subsequently given D50, he was brought to the emergency room he was found to have hypothermia temperature of 92 Fahrenheit .  Was placed on a Susi hugger.  His initial work-up in the emergency room was negative.  He continues his insulin pump.  He was evaluated by his endocrinologist through tele 7 on September 2 he is not had any adjustments to his insulin pump.  Patient denied any fevers at home no pain.  No falls.  Patient is a full code.    Review of Systems    The 10 point Review of Systems is negative other than noted in the HPI or here.      Past Medical History    I have reviewed this patient's medical history and updated it with pertinent information if needed.   Past Medical History:   Diagnosis Date     Diabetes (H)     type 1   per patient report.  Diagnosed at age 32 something. currently on insulin pump     Diabetic peripheral neuropathy (H)      Hyperlipidemia      Hypertension      Hypothyroid        Past Surgical History   I have reviewed this patient's surgical history and updated it with pertinent information if needed.  Past Surgical History:   Procedure Laterality Date     CHOLECYSTECTOMY       COLONOSCOPY - HIM SCAN   05/12/2015    Colonoscopy, Dr Huber 5/12/2015       Social History   I have reviewed this patient's social history and updated it with pertinent information if needed.  Social History     Tobacco Use     Smoking status: Former Smoker     Smokeless tobacco: Never Used   Substance Use Topics     Alcohol use: Yes     Comment: moderately     Drug use: No       Family History     No significant family history.    Prior to Admission Medications   Prior to Admission Medications   Prescriptions Last Dose Informant Patient Reported? Taking?   BAQSIMI TWO PACK 3 MG/DOSE POWD   Yes No   Sig: Apply 3 mg into one nostril as directed once as needed   COMBIGAN 0.2-0.5 % ophthalmic solution   Yes No   Sig: Place 1 drop into the right eye 2 times daily   INSULIN PUMP - OUTPATIENT   Yes Yes   Sig: Date Last Updated:   Insulin Pump Brand: Medtronic [id number]  Pump Basal Rates-Times:  Basal Pattern A: will use when .  CARB RATIO:  CF / Sensitivity Times:  TARGET BG:  Active Insulin Time:  hours  Basal to Bolus Ratio: % to  %  Sensor:      Glucose Sensor YES/NO:   MULTI MINERALS-AMINO ACIDS PO   Yes Yes   Sig: Take 1 tablet by mouth daily   NOVOLOG VIAL 100 UNIT/ML soln   Yes No   Sig: Inject 80 Units Subcutaneous continuous   Vitamin D3 (CHOLECALCIFEROL) 125 MCG (5000 UT) tablet   Yes Yes   Sig: Take 125 mcg by mouth daily   dorzolamide (TRUSOPT) 2 % ophthalmic solution   Yes No   Sig: Place 1 drop into the right eye 3 times daily   hydrochlorothiazide (HYDRODIURIL) 25 MG tablet   Yes Yes   Sig: Take 25 mg by mouth daily   insulin glargine (LANTUS PEN) 100 UNIT/ML pen   Yes Yes   Sig: Inject 25 Units Subcutaneous daily as needed for other . Take 25 units within 1-2 hours of pump failure and every 24 hours   levothyroxine (SYNTHROID/LEVOTHROID) 137 MCG tablet   Yes Yes   Sig: Take 137 mcg by mouth daily    lisinopril (PRINIVIL/ZESTRIL) 40 MG tablet   Yes Yes   Sig: Take 40 mg by mouth daily   metoprolol tartrate (LOPRESSOR) 50 MG tablet    Yes Yes   Sig: Take 50 mg by mouth 2 times daily    multivitamin, therapeutic with minerals (MULTI-VITAMIN) TABS tablet   Yes Yes   Sig: Take 1 tablet by mouth daily   rosuvastatin (CRESTOR) 10 MG tablet   Yes Yes   Sig: Take 10 mg by mouth daily      Facility-Administered Medications: None     Allergies   No Known Allergies    Physical Exam   Vital Signs: Temp: 99.4  F (37.4  C) Temp src: Tympanic BP: 141/74 Pulse: 96   Resp: 21 SpO2: 95 % O2 Device: None (Room air)    Weight: 250 lbs 0 oz      Physical Exam  Constitutional:       Appearance: He is obese.   HENT:      Head: Normocephalic.      Nose: Nose normal.      Mouth/Throat:      Pharynx: Oropharynx is clear.   Eyes:      Conjunctiva/sclera: Conjunctivae normal.   Cardiovascular:      Rate and Rhythm: Normal rate.   Pulmonary:      Effort: Pulmonary effort is normal.   Abdominal:      General: Abdomen is flat.   Musculoskeletal:         General: Normal range of motion.   Skin:     General: Skin is warm.      Comments: Mild bruising lower ext   Neurological:      Mental Status: He is alert. Mental status is at baseline.      Motor: Tremor present.       Data   Data reviewed today: I reviewed all medications, new labs and imaging results over the last 24 hours. I personally reviewed no images or EKG's today.    Recent Labs   Lab 09/16/21  1122 09/16/21  1020 09/16/21  0922   WBC  --  9.3  --    HGB  --  15.5  --    MCV  --  91  --    PLT  --  229  --    INR  --  0.92  --    NA  --  135  --    POTASSIUM  --  4.4  --    CHLORIDE  --  105  --    CO2  --  23  --    BUN  --  44*  --    CR  --  1.76*  --    ANIONGAP  --  7  --    DESMOND  --  8.8  --    * 185* 152*   ALBUMIN  --  3.3*  --    PROTTOTAL  --  7.1  --    BILITOTAL  --  0.5  --    ALKPHOS  --  96  --    ALT  --  37  --    AST  --  23  --    LIPASE  --  42*  --    TROPONIN  --  <0.015  --      Recent Results (from the past 24 hour(s))   CT Head w/o Contrast    Narrative    PROCEDURE: CT HEAD W/O  CONTRAST 9/16/2021 9:56 AM    HISTORY: Transient ischemic attack (TIA)    COMPARISONS: None.    Meds/Dose Given: None.    TECHNIQUE: Axial noncontrast enhanced images with coronal and sagittal  reformatted images.    FINDINGS: Ventricles, sulci and basilar cisterns are normal in size  for patient of this age. No mass or midline shift is seen and there is  no extra-axial fluid collection or focal hemorrhage.    Bone windows show no fracture. Visualized paranasal sinuses and  mastoid air cells are clear.         Impression    IMPRESSION: No intracranial mass effect or hemorrhage.    MJ COBURN MD         SYSTEM ID:  [id number]   XR Chest Port 1 View    Narrative    PROCEDURE: XR CHEST PORT 1 VIEW 9/16/2021 10:02 AM    HISTORY: dyspnea    COMPARISONS: 10/6/2018.    TECHNIQUE: Single portable view.    FINDINGS: Heart is unenlarged. Lungs are clear and no pleural effusion  is seen.         Impression    IMPRESSION: No acute infiltrate.    MJ COBURN MD         SYSTEM ID:  [id number]

## 2021-09-16 NOTE — ED NOTES
Blood glucose by EMS was 50 at 0840 and was glucose was given by EMS.  Currently saline lock.  Glucose repeated here:152.

## 2021-09-16 NOTE — ED NOTES
Care Transitions focused note:      Call to Amira Robertson [facility] care coordinator.  She will fax updated notes from recent visits to us.    SHARON Fuchs

## 2021-09-16 NOTE — PLAN OF CARE
"Admitted to ICU. Alert and oriented. History of diabetic retinopathy and neuropathy - has chronic blurred vision  in both eyes and chronic numbness and tingling in all extremities. T max - 99.4F. Diaphoretic intermittently. Remains hemodynamically stable. Telemetry SR; rate controlled. -150s. Lungs clear; O2 sats adequate on room air. Tolerating diabetic diet. Up SBA to bathroom; adequate urine output. IV in place; NS infusing at 100. Call light remains within reach and makes needs known.     Patient has home insulin pump in place delivering home dose of basal rate insulin - see chart for details. Nursing staff will check blood sugars and cover with sliding scale coverage. Insulin coverage plan discussed with patient and attending physician; patient aware and will not bolus insulin on home pump.     Patient comes from home independently but stated he struggles with ambulating and transferring \"sometimes\". He stated he has a hard time seeing and does not feel items like rugs and trips on them. Also stated that sometimes he has to \"go down the stairs sliding on my butt because I just cant\". Would benefit from PT/OT consult.     Face to face report given with opportunity to observe patient.    Report given to EZEQUIEL Waldrop RN   9/16/2021  6:58 PM      "

## 2021-09-16 NOTE — PLAN OF CARE
Hypothermic upon admission to ED. Tympanic temp upon admission to ICU 99.4F. Patient refused rectal temp d/t normothermic tympanic assessment.

## 2021-09-16 NOTE — ED PROVIDER NOTES
"  History     Chief Complaint   Patient presents with     Hypoglycemia     HPI  Homer Triana is a 61 year old male who is transported to our emergency department by EMS.  He states when he woke up this morning he was very confused and was suffering from generalized weakness.  He states \"I could not move my arms or legs\".  He was also apparently very diaphoretic and \"freezing\".  EMS was summoned to the patient's home and his Accu-Chek was \"50\".  IV access was established and the patient was given intravenous dextrose.  He states he is \"starting to feel better\".  The patient apparently has had some issues with hypoglycemia in the past.  He states he had a teleconference with his endocrinologist in [place] last week and had labs done.  He states that he did not have a headache.  He states his vision was \"blurry\".  He states that he had a \"fluttering\" in his chest.  He currently denies chest pain.  He states he has had no recent nausea or vomiting.  He states he has had no recent hematuria or dysuria.  He states he has not had diarrhea recently.  Patient was apparently discovered by the  who contacted EMS.  Allergies:  No Known Allergies    Problem List:    Patient Active Problem List    Diagnosis Date Noted     Acidosis 09/16/2021     Priority: Medium     Hypothermia, initial encounter 09/16/2021     Priority: Medium     Acute metabolic encephalopathy due to hypoglycemia 09/16/2021     Priority: Medium     Uncontrolled type 1 diabetes mellitus with hyperglycemia (H) 01/18/2019     Priority: Medium     DKA (diabetic ketoacidosis) (H) 11/22/2018     Priority: Medium     Uncontrolled type 2 DM with hyperosmolar nonketotic hyperglycemia (H) 10/06/2018     Priority: Medium     Syncope, unspecified syncope type 10/06/2018     Priority: Medium     Diabetic neuropathy (H) 10/06/2018     Priority: Medium     Back pain 10/06/2018     Priority: Medium     Generalized muscle weakness 10/06/2018     Priority: Medium "     Essential hypertension 10/06/2018     Priority: Medium     Acute kidney injury (H) 10/06/2018     Priority: Medium     Uncontrolled type 2 diabetes mellitus with hyperosmolar nonketotic hyperglycemia (H) 10/06/2018     Priority: Medium        Past Medical History:    Past Medical History:   Diagnosis Date     Diabetes (H)      Diabetic peripheral neuropathy (H)      Hyperlipidemia      Hypertension      Hypothyroid        Past Surgical History:    Past Surgical History:   Procedure Laterality Date     CHOLECYSTECTOMY       COLONOSCOPY - HIM SCAN  05/12/2015    Colonoscopy, Dr Huber 5/12/2015       Family History:    No family history on file.    Social History:  Marital Status:  Single [1]  Social History     Tobacco Use     Smoking status: Former Smoker     Smokeless tobacco: Never Used   Substance Use Topics     Alcohol use: Yes     Comment: moderately     Drug use: No        Medications:    ascorbic acid (VITAMIN C) 500 MG tablet  cinnamon 500 MG TABS  hydrochlorothiazide (HYDRODIURIL) 25 MG tablet  Insulin Aspart (NOVOLOG SC)  INSULIN PUMP - OUTPATIENT  levothyroxine (SYNTHROID/LEVOTHROID) 50 MCG tablet  lisinopril (PRINIVIL/ZESTRIL) 40 MG tablet  metoprolol tartrate (LOPRESSOR) 50 MG tablet  multivitamin, therapeutic with minerals (MULTI-VITAMIN) TABS tablet  rosuvastatin (CRESTOR) 10 MG tablet  vitamin D3 (CHOLECALCIFEROL) 2000 units tablet          Review of Systems   Constitutional: Negative.    HENT: Negative.    Eyes: Positive for visual disturbance.   Respiratory: Negative.    Cardiovascular: Positive for palpitations.   Gastrointestinal: Negative.    Genitourinary: Negative.    Musculoskeletal: Negative.    Skin: Negative.    Neurological: Positive for weakness.   Please see history of chief complaint.  All other systems reviewed and they are found unremarkable.    Physical Exam   BP: (!) 176/101  Pulse: 69  Temp: (!) 92.7  F (33.7  C)  Resp: 18  Weight: 113.4 kg (250 lb)  SpO2: 96 %      Physical  Exam is a 61-year-old gentleman who is awake alert oriented person place and time.  He is very pleasant and cooperative with my exam.  He appears mildly uncomfortable at rest HEENT normocephalic extraocular muscles intact pupils equally round and reactive to light and oropharynx is clear.  Neck is supple is full range of motion without pain.  No evidence of nuchal irritation.  Lungs are clear bilaterally.  Heart maintains a regular rate and rhythm S1 and S2 sounds are appreciated.  Abdomen is soft and its nontender no mass no organomegaly no rebound.  Extremities a full range of motion 5/5 strength no edema is noted.  Neurologic exam no focal cranial nerve deficit is appreciated.  Dermatologic exam no diffuse skin rashes or lesions are noted.  No skin wounds or cellulitis.  Carefully  Examined patient's feet and found no wounds.    ED Course        Pt. Remained stable.  Began to feel gradually improved.  Temperature came up with active warming intervention.  Patient hydrated with D5NSS.  Discussed the case with Dr. Polo, hospitalist on call, who graciously agreed to admit the patient to the ICU in critical but stable condition.         {EKG done and interpreted by myself.  Shows a normal sinus rhythm with a ventricular rate of 70 bpm.  MT interval is 158 ms.  Corrected QT is 479 ms.  There is no ST segment elevation or depression.  There is no inappropriate T wave inversion.  There is not appear to be evidence of acute ischemic change by my interpretation.    Critical Care time:  30 minutes for this patient             Results for orders placed or performed during the hospital encounter of 09/16/21 (from the past 24 hour(s))   Glucose by meter   Result Value Ref Range    GLUCOSE BY METER POCT 152 (H) 70 - 99 mg/dL   CT Head w/o Contrast    Narrative    PROCEDURE: CT HEAD W/O CONTRAST 9/16/2021 9:56 AM    HISTORY: Transient ischemic attack (TIA)    COMPARISONS: None.    Meds/Dose Given: None.    TECHNIQUE: Axial  noncontrast enhanced images with coronal and sagittal  reformatted images.    FINDINGS: Ventricles, sulci and basilar cisterns are normal in size  for patient of this age. No mass or midline shift is seen and there is  no extra-axial fluid collection or focal hemorrhage.    Bone windows show no fracture. Visualized paranasal sinuses and  mastoid air cells are clear.         Impression    IMPRESSION: No intracranial mass effect or hemorrhage.    MJ COBURN MD         SYSTEM ID:  [id number]   XR Chest Port 1 View    Narrative    PROCEDURE: XR CHEST PORT 1 VIEW 9/16/2021 10:02 AM    HISTORY: dyspnea    COMPARISONS: 10/6/2018.    TECHNIQUE: Single portable view.    FINDINGS: Heart is unenlarged. Lungs are clear and no pleural effusion  is seen.         Impression    IMPRESSION: No acute infiltrate.    MJ COBURN MD         SYSTEM ID:  [id number]   CBC with platelets differential    Narrative    The following orders were created for panel order CBC with platelets differential.  Procedure                               Abnormality         Status                     ---------                               -----------         ------                     CBC with platelets and d...[[id number]]  Abnormal            Final result                 Please view results for these tests on the individual orders.   INR   Result Value Ref Range    INR 0.92 0.85 - 1.15   Comprehensive metabolic panel   Result Value Ref Range    Sodium 135 133 - 144 mmol/L    Potassium 4.4 3.4 - 5.3 mmol/L    Chloride 105 94 - 109 mmol/L    Carbon Dioxide (CO2) 23 20 - 32 mmol/L    Anion Gap 7 3 - 14 mmol/L    Urea Nitrogen 44 (H) 7 - 30 mg/dL    Creatinine 1.76 (H) 0.66 - 1.25 mg/dL    Calcium 8.8 8.5 - 10.1 mg/dL    Glucose 185 (H) 70 - 99 mg/dL    Alkaline Phosphatase 96 40 - 150 U/L    AST 23 0 - 45 U/L    ALT 37 0 - 70 U/L    Protein Total 7.1 6.8 - 8.8 g/dL    Albumin 3.3 (L) 3.4 - 5.0 g/dL    Bilirubin Total 0.5 0.2 - 1.3 mg/dL    GFR  Estimate 41 (L) >60 mL/min/1.73m2   Lipase   Result Value Ref Range    Lipase 42 (L) 73 - 393 U/L   Lactic acid whole blood   Result Value Ref Range    Lactic Acid 1.6 0.7 - 2.0 mmol/L   Troponin I   Result Value Ref Range    Troponin I <0.015 0.000 - 0.045 ug/L   Magnesium   Result Value Ref Range    Magnesium 2.2 1.6 - 2.3 mg/dL   TSH with free T4 reflex   Result Value Ref Range    TSH 3.52 0.40 - 4.00 mU/L   Blood gas venous and oxyhgb   Result Value Ref Range    pH Venous 7.26 (L) 7.32 - 7.43    pCO2 Venous 52 (H) 40 - 50 mm Hg    pO2 Venous 30 25 - 47 mm Hg    Bicarbonate Venous 23 21 - 28 mmol/L    FIO2 0     Oxyhemoglobin Venous 57 (L) 70 - 75 %    Base Excess/Deficit (+/-) -4.5 -7.7 - 1.9 mmol/L   Nt probnp inpatient (BNP)   Result Value Ref Range    N terminal Pro BNP Inpatient 296 0 - 900 pg/mL   CBC with platelets and differential   Result Value Ref Range    WBC Count 9.3 4.0 - 11.0 10e3/uL    RBC Count 4.93 4.40 - 5.90 10e6/uL    Hemoglobin 15.5 13.3 - 17.7 g/dL    Hematocrit 44.7 40.0 - 53.0 %    MCV 91 78 - 100 fL    MCH 31.4 26.5 - 33.0 pg    MCHC 34.7 31.5 - 36.5 g/dL    RDW 11.9 10.0 - 15.0 %    Platelet Count 229 150 - 450 10e3/uL    % Neutrophils 86 %    % Lymphocytes 7 %    % Monocytes 7 %    % Eosinophils 0 %    % Basophils 0 %    % Immature Granulocytes 0 %    NRBCs per 100 WBC 0 <1 /100    Absolute Neutrophils 7.8 1.6 - 8.3 10e3/uL    Absolute Lymphocytes 0.7 (L) 0.8 - 5.3 10e3/uL    Absolute Monocytes 0.7 0.0 - 1.3 10e3/uL    Absolute Eosinophils 0.0 0.0 - 0.7 10e3/uL    Absolute Basophils 0.0 0.0 - 0.2 10e3/uL    Absolute Immature Granulocytes 0.0 <=0.0 10e3/uL    Absolute NRBCs 0.0 10e3/uL   Glucose by meter   Result Value Ref Range    GLUCOSE BY METER POCT 196 (H) 70 - 99 mg/dL   Blood gas venous and oxyhgb   Result Value Ref Range    pH Venous 7.22 (L) 7.32 - 7.43    pCO2 Venous 59 (H) 40 - 50 mm Hg    pO2 Venous 29 25 - 47 mm Hg    Bicarbonate Venous 24 21 - 28 mmol/L    FIO2 0      Oxyhemoglobin Venous 51 (L) 70 - 75 %    Base Excess/Deficit (+/-) -5.0 -7.7 - 1.9 mmol/L   Asymptomatic COVID-19 Virus (Coronavirus) by PCR Nasopharyngeal    Specimen: Nasopharyngeal; Swab   Result Value Ref Range    SARS CoV2 PCR Negative Negative    Narrative    Testing was performed using the Xpert Xpress SARS-CoV-2 Assay on the   Cepheid Gene-Xpert Instrument Systems. Additional information about   this Emergency Use Authorization (EUA) assay can be found via the Lab   Guide. This test should be ordered for the detection of SARS-CoV-2 in   individuals who meet SARS-CoV-2 clinical and/or epidemiological   criteria. Test performance is unknown in asymptomatic patients. This   test is for in vitro diagnostic use under the FDA EUA for   laboratories certified under CLIA to perform high complexity testing.   This test has not been FDA cleared or approved. A negative result   does not rule out the presence of PCR inhibitors in the specimen or   target RNA in concentration below the limit of detection for the   assay. The possibility of a false negative should be considered if   the patient's recent exposure or clinical presentation suggests   COVID-19. This test was validated by [facility]. This laboratory is certified under the Clinical Laboratory Improve  ment Amendments (CLIA) as qualified to perform high complexity testing.   Blood gas arterial and oxyhgb   Result Value Ref Range    pH Arterial 7.29 (L) 7.35 - 7.45    pCO2 Arterial 42 35 - 45 mm Hg    pO2 Arterial 62 (L) 80 - 105 mm Hg    Bicarbonate Arterial 20 (L) 21 - 28 mmol/L    Oxyhemoglobin Arterial 91 (L) 92 - 100 %    Base Excess/Deficit (+/-) -6.4 -9.0 - 1.8 mmol/L    FIO2 21        Medications   dextrose 5% and 0.9% NaCl infusion ( Intravenous New Bag 9/16/21 0943)       Assessments & Plan (with Medical Decision Making)     I have reviewed the nursing notes.    I have reviewed the findings, diagnosis, plan and need for  follow up with the patient.  The plan is to admit the patient to the hospitalist service to the ICU.    New Prescriptions    No medications on file       Final diagnoses:   Acute metabolic encephalopathy due to hypoglycemia   Hypothermia, initial encounter   Acidosis       9/16/2021   [facility]     Alfonso Frank,   09/16/21 2334

## 2021-09-16 NOTE — PLAN OF CARE
Prior to Admission Medication Reconciliation:     Medications added:   [] None  [x] As listed below:    baqsimi- pt could not afford and never picked up    lantus- pt did not know it was prescribed, never started    Glucose tabs- affordable for pt to lower BG    combigan    trusopt    accu-chek diabetic supplies    Insulin pump        Medications deleted:   [] None  [x] As listed below:    Ascorbic acid    cinnamon    Medications marked for review/removal by attending:  [x] None  [] As listed below:      Changes made to existing medications:   [] None  [x] Updated strengths and frequencies to most current  [x] As listed below:    Pt takes all meds in the evening    Last times/dates taken verified with patient:  [x] Yes- completed myself  [] Prepared PTA medlist for review only. (will not be available to review personally)  [] Did not review with patient. Rx verification only. Review completed by nursing.    [] Nurse completed no changes made (double checked entries)  [] Unable to review with patient at this time:  [] Nurse completed/changes made:       Allergies listed at another location:  [x]Allergies match allergies listed in Epic  []Other allergies listed:    Allergy review:    [x]Did not review: reviewed by nursing  []Did not review: pt unable at this time  []Patient/MAR verified NKDA  []Patient/MAR verified current existing allergies: no changes made  []Patient confirmed current existing allergies and new allergies added:    Medication reconciliation sources:   []Patient  []Patient family member/emergency contact: **  [x][facility] Report Review:  Home Medications     - Last Reconciled 08/25/21 by Arelis Umanzor [facility]    [x]amino acids-multivit with iron and minerals tablet  tabs PO   [x]glucagon 3 mg/actuation (Baqsimi) 3 mg  intranasal ONCE PRN 2 ea 1RF severe [x]hypoglycemia blood sugar diagnostic (Accu-Chek Guide test strips) As directed   [x]blood-glucose meter (Accu-Chek Guide Glucose Meter) As directed  "test blood sugar 4 times daily   [x]brimonidine 0.15 % eye drops 1 drp Right Eye TID   [x]cholecalciferol (vitamin D3) 125 mcg (5,000 unit) capsule 5,000 units PO DAILY   [x]dorzolamide 2 % eye drops 1 drp Right Eye TID 90 days   flash glucose scanning reader (FreeStyle Ronny 2 Rosebud) As directed, for continuous glucose monitoring (not covered by pt's insurance)  flash glucose sensor (FreeStyle Ronny 2 Sensor) As directed, for continuous glucose monitoring, change every 14 days (not covered by pt's insurance)  [x]hydrochlorothiazide 25 mg tablet 25 mg PO DAILY   [x]insulin glargine 100 unit/mL (3 mL) subcutaneous pen (Lantus Solostar U-100 Insulin) 25 units (0.25 mL) SUBCUT DAILY PRN   insulin syringe-needle U-100 0.3 mL 31 gauge x 5/16\" (BD Insulin Syringe Ultra-Fine) use to inject insulin as needed for pump failure   lancets (Accu-Chek Softclix Lancets) As directed to test blood glucose QID   [x]levothyroxine 137 mcg tablet 137 mcg PO DAILY   [x]lisinopril 40 mg tablet 40 mg PO DAILY   [x]metoprolol tartrate 50 mg tablet 50 mg PO BID   [x]Novolog U-100 Insulin aspart 100 unit/mL subcutaneous solution (insulin aspart U-100) 80 units (0.8 mL) continuous subcutaneous infusion DAILY NS   pen needle, diabetic 31 gauge x 5/16\" (Comfort EZ Pen Needles) As directed, use as needed for insulin injection   [x]rosuvastatin 10 mg tablet 10 mg PO DAILY   [x]timolol 0.5 % eye drops 1 drp Right Eye BID   []Epic Chart Review  []Care Everywhere review  []Pharmacy med list: **  []Pharmacy phone call  [x]Outside meds dispense report: see below  []Nursing home or Assisted Living MAR:  [x]Other: last up to date pump information from 5/18/21- pt was seen 9/2 and no pump settings were changed.   Basal rates are 1.3 units/hour at midnight, 1.1 at 3:00 a.m., 1.1 at noon, and 1.2 at 3:00 p.m..  Carb ratio 15, correction factor 35, active insulin time equals 4 hours, and blood glucose target .  He does however continue to manual bolus " for meals and corrections.  The bolus dose calculator is set up in case he wants to use that.  Average total daily dose of insulin is 44 units, 64% basal and 36% bolus insulin.    We were unable to download his insulin pump today due to not having the correct USB for his new insulin pump.  Blood sugars are tested 4+ times daily and he notes a range of .  He notes that his morning blood sugars have been decent generally around 120.  He notes occasional low blood sugars without any particular patterns.  He does have history of hypoglycemia unawareness so it is a priority to prevent low blood sugars and get him started on a continuous glucose sensor that will alert him to low blood sugars.     Pharmacy desired at discharge:    Is patient on coumadin?  [x]No      Requests for consultation by provider or pharmacist:   [x] Patient understands why all of their meds were prescribed and how to take them. No questions.   [] Managing party has no questions.   [] Patient/ managing party has questions about the following:  [] Did not review with patient. Cannot assess.     Fill dates and reported compliancy:  [x] Fill dates coincide with reported compliancy for all/most maintenance meds.   [] Fill dates do not coincide with compliancy with maintenance meds. See notes in PTA medlist and in comments.    [] Fill dates do not coincide with the following medications but pt reports compliancy:  [] Did not review with patient. Cannot assess.     Historian accuracy:  [x] Excellent- alert and oriented, understands why meds were prescribed and how to take, able to answer specifics  [] Good- alert and oriented, understands why meds were prescribed and how to take, some confusion   [] Fair- alert and oriented, doesn't know medications without list, cannot answer specifics about medications, but has a decent process for which to take at home  [] Poor- does not know medications, may not have a process to take at home, may be cognitively  unable to review at this time  []Medication management done by family member or facility, no concerns about historian accuracy.   [] Did not review with patient. Cannot assess.     Medication Management:  [x] Manages meds independently  [] Family member/ other party manages meds/assists:  [] Meds managed by staff at facility  [] Meds set up by home care, family/other party helps administer  [] Meds set up by home care, self administers  [] Did not review with patient. Cannot assess.     Other medications aside from PTA:  [x] Denies taking any medications aside from those listed in PTA meds  [] Reports taking another medication(s) but cannot recall the name(s)  [] Refuses to say.  [] Did not review with patient. Cannot assess.     Comments: none    Galina Feng on 9/16/2021 at 3:03 PM       Discrepancies: [x] No []Not Applicable [x]Yes: listed below    Issues completing PTA medication reconciliation:  [] On hold for a long time  [] Waited for a call back  [] Fax didn't come through  [] Fax took a long time  [] Other:    Notifying appropriate party of changes/additions/discrepancies:  []No pertinent changes made, notification not necessary.   [] Notified attending provider via text page/phone call  [] Notified attending provider in person  [] Notified pharmacy  [] Notified nurse  [x] Medications have not been reconciled by a provider yet, notification not necessary  [] Pt is not admitted to floor yet, PTA meds completed before admission.   Medications Prior to Admission   Medication Sig Dispense Refill Last Dose     blood glucose (ACCU-CHEK GUIDE) test strip 1 strip by In Vitro route 4 times daily        Blood Glucose Monitoring Suppl (ACCU-CHEK GUIDE) w/Device KIT by In Vitro route 4 times daily   Past Week at Unknown time     COMBIGAN 0.2-0.5 % ophthalmic solution Place 1 drop into the right eye 2 times daily   9/15/2021 at HS     dorzolamide (TRUSOPT) 2 % ophthalmic solution Place 1 drop into the right eye 3 times  daily   9/15/2021 at HS     hydrochlorothiazide (HYDRODIURIL) 25 MG tablet Take 25 mg by mouth every evening    9/15/2021 at PM     INSULIN PUMP - OUTPATIENT Insulin Pump Brand: Medtronic [id number] G  Pump Basal Rates-Times:1.3 units/hour at midnight, 1.1 at 3:00 a.m., 1.1 at noon, and 1.2 at 3:00 p.m  CARB RATIO: 15  Correction factor: 35   Pt and Provider established A1C goal: 8.0  Active Insulin Time:  4 hours  Average total daily dose of insulin is 44 units, 64% basal and 36% bolus insulin  Provider: Lindsay J Schwab-Peterson, CNP   9/16/2021 at Unknown time     levothyroxine (SYNTHROID/LEVOTHROID) 137 MCG tablet Take 137 mcg by mouth every evening    9/15/2021 at PM     lisinopril (PRINIVIL/ZESTRIL) 40 MG tablet Take 40 mg by mouth every evening    9/15/2021 at PM     metoprolol tartrate (LOPRESSOR) 50 MG tablet Take 50 mg by mouth 2 times daily    9/15/2021 at PM     MULTI MINERALS-AMINO ACIDS PO Take 1 tablet by mouth every evening    9/15/2021 at PM     Multiple Vitamins-Minerals (MENS MULTIVITAMIN PO) Take 1 tablet by mouth every evening   9/15/2021 at PM     NOVOLOG VIAL 100 UNIT/ML soln Inject 80 Units Subcutaneous continuous with insulin pump.   9/16/2021 at Unknown time     Nutritional Supplements (GLUCOSE MANAGEMENT) TABS Take 1 tablet by mouth daily as needed (low lood glucose)   Past Month at Unknown time     rosuvastatin (CRESTOR) 10 MG tablet Take 10 mg by mouth every evening    9/15/2021 at PM     Vitamin D3 (CHOLECALCIFEROL) 125 MCG (5000 UT) tablet Take 125 mcg by mouth every evening    9/15/2021 at PM     BAQSIMI TWO PACK 3 MG/DOSE POWD Apply 3 mg into one nostril as directed once as needed (Patient not taking: Reported on 9/16/2021)   Not Taking at Unknown time     insulin glargine (LANTUS PEN) 100 UNIT/ML pen Inject 25 Units Subcutaneous daily as needed for other . Take 25 units within 1-2 hours of pump failure and every 24 hours (Patient not taking: Reported on 9/16/2021)   Not Taking at Unknown  time               Medication Dispense History (from 9/16/2020 to 9/16/2021)  Expand All  Collapse All    Brimonidine Tartrate-Timolol     Dispensed Days Supply Quantity Provider Pharmacy   COMBIGAN 0.2/0.5%   SOL 04/01/2021 37 5 mL LESLYE WOODS [id number] ...           Dorzolamide HCl     Dispensed Days Supply Quantity Provider Pharmacy   dorzolamide 2 % eye drops 05/04/2021 90 20 mL [facility]   DORZOLAMIDE OPHTH 2% SOL 03/31/2021 50 10 mL [facility] ...   dorzolamide 2 % eye drops 02/05/2021 50 10 mL [facility] ...           Glucagon     Dispensed Days Supply Quantity Provider Pharmacy   Baqsimi 3 mg/actuation nasal spray 09/02/2021 30 2 Units SAIRA QUIROZ [facility] Mail D...           Glucose Blood     Dispensed Days Supply Quantity Provider Pharmacy   ACCU-CHEK NATALI PLUS  JOAQUIM 03/08/2021 90 400 Units [facility] ...   ACCU-CHEK NATALI PLUS  JOAQUIM 09/23/2020 90 400 [facility] ...           Insulin Aspart     Dispensed Days Supply Quantity Provider Pharmacy   NOVOLOG 100UNIT/ML  INJ 06/09/2021 88 70 mL [facility] ...   NOVOLOG 100UNIT/ML  INJ 03/01/2021 88 70 mL SCHWAB,VIK[facility] ...   NOVOLOG 100UNIT/ML  INJ 11/24/2020 88 90 mL BRIDGETTE TAVAREZ [facility] ...           Lancets     Dispensed Days Supply Quantity Provider Pharmacy   SOFTCLIX LANCETS    MIS 03/05/2021 90 400 Units [facility] ...   SOFTCLIX LANCETS    MIS 09/24/2020 90 400 [facility] ...           Levothyroxine Sodium     Dispensed Days Supply Quantity Provider Pharmacy   levothyroxine 137 mcg tablet 07/06/2021 90 90 tablet BRIDGETTE TAVAREZ [facility] Mail D...   levothyroxine 137 mcg tablet 04/27/2021 90 90 tablet BRIDGETTE TAVAREZ [facility] Mail D...   levothyroxine 137 mcg  tablet 02/14/2021 90 90 tablet [facility] Mail D...   levothyroxine 137 mcg tablet 12/08/2020 90 [facility] ...   LEVOTHYROXIN 137MCG TAB 09/24/2020 90 90 Units SCHWAB,LINDSAY [facility] ...           Lisinopril     Dispensed Days Supply Quantity Provider Pharmacy   LISINOPRIL 40MG     TAB [facility]/23/2020 30 [facility]   LISINOPRIL 40MG     TAB 10/21/2020 30 30 Units [facility] ...   LISINOPRIL 40MG     TAB 09/21/2020 30 30 Units [facility] ...           Metoprolol Tartrate     Dispensed Days Supply Quantity Provider Pharmacy   metoprolol tartrate 50 mg tablet 07/08/2021 90 180 tablet [facility] Mail D...   metoprolol tartrate 50 mg tablet 04/29/2021 90 180 tablet [facility] Mail D...   metoprolol tartrate 50 mg tablet 02/17/2021 90 180 tablet [facility] Mail D...   metoprolol tartrate 50 mg tablet 12/11/2020 90 180 tablet [facility] Mail D...   METOPROLOL TART 50MG TAB 11/23/2020 30 60 Units [facility] ...           Rosuvastatin Calcium     Dispensed Days Supply Quantity Provider Pharmacy   rosuvastatin 10 mg tablet 07/08/2021 90 90 tablet [facility] Mail D...   rosuvastatin 10 mg tablet 04/29/2021 90 90 tablet [facility] Mail D...   rosuvastatin 10 mg tablet 02/17/2021 90 90 tablet [facility] Mail D...   rosuvastatin 10 mg tablet 12/11/2020 90 90 tablet [facility] Mail D...   ROSUVASTATIN 10MG TAB 11/23/2020 30 30 Units [facility] ...   ROSUVASTATIN 10MG TAB 10/21/2020 30 30 Units [facility] ...   ROSUVASTATIN 10MG TAB 09/21/2020 30 30 Units [facility]GETTE TAV[facility] ...           Other     Dispensed Days  Supply Quantity [facility]   lisinopril 40 mg tablet 07/08/2021 90 90 tablet [place] Mail D...   lisinopril 40 mg tablet 04/29/2021 90 90 tablet [facility] Mail D...   lisinopril 40 mg tablet 02/17/2021 90 90 tablet [facility] Mail D...   lisinopril 40 mg tablet 12/10/2020 90 90 tablet [facility] Mail D...           hydroCHLOROthiazide     Dispensed Days Supply Quantity [facility]   hydrochlorothiazide 25 mg tablet 08/31/2021 90 90 tablet [facility] Mail D...   hydrochlorothiazide 25 mg tablet 06/18/2021 90 90 tablet [facility] Mail D...   hydrochlorothiazide 25 mg tablet 04/09/2021 90 90 tablet [facility] Mail D...   hydrochlorothiazide 25 mg tablet 01/30/2021 90 90 tablet [facility] Mail D...   HYDROCHLOROTHIAZIDE 25MG TAB 11/23/2020 90 90 Units REBEKAH-GIOVANNA,ADRIENNE [facility] ...           Disclaimer    Certain dispenses may not be available or accurate in this report, including over-the-counter medications, low cost prescriptions, prescriptions paid for by the patient or non-participating sources, or errors in insurance claims information. The provider should independently verify medication history with the patient.     External Sources

## 2021-09-16 NOTE — PLAN OF CARE
Admit to ICU at 1430    [facility] Inpatient Admission Note:    Patient admitted to 3128/3128-1 at approximately 1430 via cart accompanied by transport tech from emergency room . Report received from Ana in SBAR format at 1410 via telephone. Patient transferred to bed via self.. Patient is alert and oriented X 3, denies pain; rates at 0 on 0-10 scale.  Patient oriented to room, unit, hourly rounding, and plan of care. Explained admission packet and patient handbook with patient bill of rights brochure. Will continue to monitor and document as needed.     Inpatient Nursing criteria listed below was met:    Health care directives status obtained and documented: Yes    Patient identifies a surrogate decision maker: No If yes,      If initial lactic acid greater than 2.0, repeat lactic acid drawn within one hour of arrival to unit: NA. If no, state reason:     Clergy visit ordered if patient requests: N/A    Skin issues/needs documented: Yes    Isolation Patient: no Education given, correct sign in place and documentation row added to PCS:     Fall Prevention Yes: Care plan updated, education given and documented, sticker and magnet in place: Yes    Care Plan initiated: Yes    Education Documented (including assessment): Yes    Patient has discharge needs : Yes If yes, please explain:PT/OT

## 2021-09-17 ENCOUNTER — APPOINTMENT (OUTPATIENT)
Dept: OCCUPATIONAL THERAPY | Facility: HOSPITAL | Age: 61
DRG: 917 | End: 2021-09-17
Attending: HOSPITALIST
Payer: MEDICARE

## 2021-09-17 ENCOUNTER — APPOINTMENT (OUTPATIENT)
Dept: ULTRASOUND IMAGING | Facility: HOSPITAL | Age: 61
DRG: 917 | End: 2021-09-17
Attending: HOSPITALIST
Payer: MEDICARE

## 2021-09-17 ENCOUNTER — APPOINTMENT (OUTPATIENT)
Dept: PHYSICAL THERAPY | Facility: HOSPITAL | Age: 61
DRG: 917 | End: 2021-09-17
Attending: HOSPITALIST
Payer: MEDICARE

## 2021-09-17 LAB
ANION GAP SERPL CALCULATED.3IONS-SCNC: 7 MMOL/L (ref 3–14)
BUN SERPL-MCNC: 51 MG/DL (ref 7–30)
CALCIUM SERPL-MCNC: 8.3 MG/DL (ref 8.5–10.1)
CHLORIDE BLD-SCNC: 109 MMOL/L (ref 94–109)
CO2 SERPL-SCNC: 22 MMOL/L (ref 20–32)
CREAT SERPL-MCNC: 2.17 MG/DL (ref 0.66–1.25)
ERYTHROCYTE [DISTWIDTH] IN BLOOD BY AUTOMATED COUNT: 12.3 % (ref 10–15)
GFR SERPL CREATININE-BSD FRML MDRD: 32 ML/MIN/1.73M2
GLUCOSE BLD-MCNC: 223 MG/DL (ref 70–99)
GLUCOSE BLDC GLUCOMTR-MCNC: 166 MG/DL (ref 70–99)
GLUCOSE BLDC GLUCOMTR-MCNC: 237 MG/DL (ref 70–99)
GLUCOSE BLDC GLUCOMTR-MCNC: 290 MG/DL (ref 70–99)
GLUCOSE BLDC GLUCOMTR-MCNC: 293 MG/DL (ref 70–99)
GLUCOSE BLDC GLUCOMTR-MCNC: 307 MG/DL (ref 70–99)
GLUCOSE BLDC GLUCOMTR-MCNC: 317 MG/DL (ref 70–99)
HCT VFR BLD AUTO: 37.9 % (ref 40–53)
HGB BLD-MCNC: 12.9 G/DL (ref 13.3–17.7)
MCH RBC QN AUTO: 31.3 PG (ref 26.5–33)
MCHC RBC AUTO-ENTMCNC: 34 G/DL (ref 31.5–36.5)
MCV RBC AUTO: 92 FL (ref 78–100)
PLATELET # BLD AUTO: 208 10E3/UL (ref 150–450)
POTASSIUM BLD-SCNC: 4.4 MMOL/L (ref 3.4–5.3)
RBC # BLD AUTO: 4.12 10E6/UL (ref 4.4–5.9)
SODIUM SERPL-SCNC: 138 MMOL/L (ref 133–144)
WBC # BLD AUTO: 9.8 10E3/UL (ref 4–11)

## 2021-09-17 PROCEDURE — 250N000013 HC RX MED GY IP 250 OP 250 PS 637: Performed by: HOSPITALIST

## 2021-09-17 PROCEDURE — 76770 US EXAM ABDO BACK WALL COMP: CPT

## 2021-09-17 PROCEDURE — 258N000003 HC RX IP 258 OP 636: Performed by: HOSPITALIST

## 2021-09-17 PROCEDURE — 97166 OT EVAL MOD COMPLEX 45 MIN: CPT | Mod: GO

## 2021-09-17 PROCEDURE — 36415 COLL VENOUS BLD VENIPUNCTURE: CPT | Performed by: HOSPITALIST

## 2021-09-17 PROCEDURE — 80048 BASIC METABOLIC PNL TOTAL CA: CPT | Performed by: HOSPITALIST

## 2021-09-17 PROCEDURE — 97161 PT EVAL LOW COMPLEX 20 MIN: CPT | Mod: GP

## 2021-09-17 PROCEDURE — 120N000001 HC R&B MED SURG/OB

## 2021-09-17 PROCEDURE — 99233 SBSQ HOSP IP/OBS HIGH 50: CPT | Performed by: HOSPITALIST

## 2021-09-17 PROCEDURE — 85027 COMPLETE CBC AUTOMATED: CPT | Performed by: HOSPITALIST

## 2021-09-17 PROCEDURE — 250N000011 HC RX IP 250 OP 636: Performed by: HOSPITALIST

## 2021-09-17 RX ADMIN — DORZOLAMIDE HYDROCHLORIDE 1 DROP: 20 SOLUTION/ DROPS OPHTHALMIC at 21:08

## 2021-09-17 RX ADMIN — LEVOTHYROXINE SODIUM 137 MCG: 0.11 TABLET ORAL at 21:05

## 2021-09-17 RX ADMIN — ROSUVASTATIN 10 MG: 10 TABLET, FILM COATED ORAL at 21:04

## 2021-09-17 RX ADMIN — SODIUM CHLORIDE: 9 INJECTION, SOLUTION INTRAVENOUS at 14:43

## 2021-09-17 RX ADMIN — SODIUM CHLORIDE: 9 INJECTION, SOLUTION INTRAVENOUS at 04:59

## 2021-09-17 RX ADMIN — METOPROLOL TARTRATE 50 MG: 50 TABLET, FILM COATED ORAL at 08:17

## 2021-09-17 RX ADMIN — DORZOLAMIDE HYDROCHLORIDE 1 DROP: 20 SOLUTION/ DROPS OPHTHALMIC at 08:17

## 2021-09-17 RX ADMIN — BRIMONIDINE TARTRATE, TIMOLOL MALEATE 1 DROP: 2; 5 SOLUTION/ DROPS OPHTHALMIC at 08:17

## 2021-09-17 RX ADMIN — METOPROLOL TARTRATE 50 MG: 50 TABLET, FILM COATED ORAL at 21:05

## 2021-09-17 RX ADMIN — DORZOLAMIDE HYDROCHLORIDE 1 DROP: 20 SOLUTION/ DROPS OPHTHALMIC at 14:43

## 2021-09-17 RX ADMIN — BRIMONIDINE TARTRATE, TIMOLOL MALEATE 1 DROP: 2; 5 SOLUTION/ DROPS OPHTHALMIC at 21:08

## 2021-09-17 RX ADMIN — ENOXAPARIN SODIUM 40 MG: 40 INJECTION SUBCUTANEOUS at 17:15

## 2021-09-17 ASSESSMENT — ACTIVITIES OF DAILY LIVING (ADL)
ADLS_ACUITY_SCORE: 16
ADLS_ACUITY_SCORE: 16
ADLS_ACUITY_SCORE: 15
ADLS_ACUITY_SCORE: 16
ADLS_ACUITY_SCORE: 15
ADLS_ACUITY_SCORE: 15

## 2021-09-17 ASSESSMENT — MIFFLIN-ST. JEOR: SCORE: 1991.25

## 2021-09-17 NOTE — PROVIDER NOTIFICATION
after bedtime sliding scale coverage. Dr. Daniels updated. Order rec'd for pt to bolus self from home insulin pump based on glucose reading and what pt would administer per pump in home setting. Pt bolused self 4 units per sub q insulin pump.

## 2021-09-17 NOTE — PROGRESS NOTES
Met with Homer. He had experienced a low BG on 9/16/21, brought in by ambulance and admitted.    Homer uses a Medtronic 770 insulin pump and checks BG by fingerstick. With his background basal, he gives manual boluses when eating. States he has been encouraged to use the bolus wizard in the past.    Homer changed his pump reservoir at 2 am on 9/16/21 after which he experienced a low. Reports eating a low carb evening meal. States that he may have bolused when he was to fill the cannula.    Also, Homer has poor eyesight and must use a magnifying glass to view pump and meter.    Homer would benefit from a personal cgm and meets Medicare criteria. Lindsay Schwab-Peterson, NP, is his endocrinology provider and has been trying to obtain a Ronny for him and insurance has been a barrier. Homer states that he has not been feeling his lows like he used to. He would benefit from a Ronny 2 or DexCom which would provide high and low BG alarms.    Homer is to meet with an insulin pump specialist in [facility]. In the case that appointment may be delayed, we have scheduled an appointment for him with Priti Salinas NP, who works with us in Diabetes Ed here. Appointment is 9/20/21 at 1:15 pm. If he can get into endocrinology in [place] on 9/20/21, this appointment with Priti Salinas is to be cancelled.      Lou Riojas RN, [facility]   Contact phone: [phone].   ambulatory

## 2021-09-17 NOTE — PLAN OF CARE
Pt alert and oriented x3, calm and pleasant. VS and assessments as charted. Denies Pain. T. 97.8.  at HS- received sliding scale coverage per MAR, recheck - Dr. Daniels notified and ordered to have pt bolus self via insulin pump as per home regimen (see prev note), recheck  and at 2347 down to 237. Pt voiding without difficulty. IV infusing NS @ 100cc/hr to L hand. Pt made step down th morning and transferred to room 3212. Free from falls/injury, call light within reach, makes needs known.

## 2021-09-17 NOTE — PROGRESS NOTES
[facility]    Medicine Progress Note - Hospitalist Service       Date of Admission:  9/16/2021    Assessment & Plan                 Homer Triana is a 61 year old male admitted on 9/16/2021.        Acute metabolic encephalopathy due to hypoglycemia (9/16/2021)  resolved   Found to have glucose 50 at home, EMS gave dextrose., has insulin pump but has poor vision and has been having difficulty giving boluses but can use magnifying glass. Patient sugar was repated and normal. He was continued on insulin pump, no blouses.  -OK for insulin pump to continue basal rate  -Will use ISS only  NO BOLUSES.   -Patient will not give himself any insulin   -POCT glucose with meals  -Diabetic diet  -Hypoglycemia protocol.  -Discussed with endocrinology in [place] needs out patient follow up ASAP with the pump specialist in [facility] to make sure if patient should be using pump still. At this time recs is not to change any pump settings and he should get a larger magnifying glass until he is seen out patient.    Hypothermia, initial encounter (9/16/2021) resolved     Acidosis (9/16/2021)resolved     Acute metabolic encephalopathy due to hypoglycemia (9/16/2021)   Initial temp in ER 92F, patient has shivering and chills with previous hypoglycemia. Improved with bear huger  - Will hydrate with IV fluids  -Monitor Vitals in the ICU over the next 24 hours.      Diabetic neuropathy (H) (10/6/2018)  -Out patient follow up  -May benefit from gabapentin      Essential hypertension (10/6/2018)  -continue home medications       Acute kidney injury (H) (10/6/2018)  Baseline CR 1.4     Component      Latest Ref Rng & Units 3/20/2019 9/16/2021 9/16/2021 9/17/2021           10:20 AM  3:40 PM    Sodium      133 - 144 mmol/L 138 135  138   Potassium      3.4 - 5.3 mmol/L 4.2 4.4  4.4   Chloride      94 - 109 mmol/L 103 105  109   Carbon Dioxide      20 - 32 mmol/L 28 23  22   Anion Gap      3 - 14 mmol/L 7 7  7   Glucose       70 - 99 mg/dL 183 (H) 185 (H)  223 (H)   Urea Nitrogen      7 - 30 mg/dL 25 44 (H)  51 (H)   Creatinine      0.66 - 1.25 mg/dL 1.40 (H) 1.76 (H) 1.90 (H) 2.17 (H)   GFR Estimate      >60 mL/min/1.73m2 56 (L) 41 (L) 38 (L) 32 (L)   Calcium      8.5 - 10.1 mg/dL 9.3 8.8  8.3 (L)     Not clear cause, Lisinopril and HCTZ was restarted  Making urine, UA done does not appear to be UTI  On IV fluids  Will get Renal US rule out obstruction  -Will get bladder scan and re-eval  -continue IV fluids  -Monitr urine out put  -Hold ace inhibitor and hydrochlorothiazide      Diet: Consistent Carbohydrate Diet Consistent Carb 45 grams CHO per Meal Diet    DVT Prophylaxis: Enoxaparin (Lovenox) SQ  Kimball Catheter: Not present  Central Lines: None  Code Status: Full Code          Clinically Significant Risk Factors Present on Admission               Disposition Plan     Expected discharge: 1-2 days recommended to prior living arrangement once imporovement in sugars and normal functioning insulin pump and hypothermia resolution, PT and OT eval and improvement of renal function.         The patient's care was discussed with the Bedside Nurse and Patient.      Sunny Sanchez DO  Hospitalist Service  [facility]  Securely message with the Vocera Web Console (learn more here)  Text page via [employer] Paging/Directory      Clinically Significant Risk Factors Present on Admission          # Hypocalcemia: Ca = 8.3 mg/dL (Ref range: 8.5 - 10.1 mg/dL) and/or iCa = N/A on admission, will replace as needed          ______________________________________________________________________    Interval History   Patient was seen this morning for medical rounds. Patient denies chest pain or shortness of breath. He states he is feeling better. Patient will be seen by physical therapy and Occupational Therapy. Patient was discussed with endocrinology in [facility] and their recommendations at this time patient should be continued on  his home insulin dosing and he should get a bigger magnifying glass and he should be seen as soon as possible and [facility] diabetic clinic and seen by the pump specialist in regards to if he should continue using his pump or not. Patient will need outpatient follow-up. He did develop acute kidney injury not clear from what his lisinopril and hydrochlorothiazide being held he is on IV fluids renal ultrasound was ordered. He denied any back pain or urinary symptoms. Patient will be monitored another day and discussed with nursing staff.    Data reviewed today: I reviewed all medications, new labs and imaging results over the last 24 hours. I personally reviewed no images or EKG's today.    Physical Exam   Vital Signs: Temp: 97.2  F (36.2  C) Temp src: Tympanic BP: 160/72 (recheck L arm) Pulse: 81   Resp: 18 SpO2: 96 % O2 Device: None (Room air)    Weight: 257 lbs 15.01 oz   Physical Exam  HENT:      Head: Normocephalic.      Nose: Nose normal.      Mouth/Throat:      Pharynx: Oropharynx is clear.   Eyes:      Conjunctiva/sclera: Conjunctivae normal.   Cardiovascular:      Rate and Rhythm: Normal rate.   Pulmonary:      Effort: Pulmonary effort is normal.   Abdominal:      General: Abdomen is flat.   Musculoskeletal:         General: Normal range of motion.   Skin:     General: Skin is warm.   Neurological:      Mental Status: He is alert. Mental status is at baseline.         Data   Recent Labs   Lab 09/17/21  0815 09/17/21  0452 09/17/21  0230 09/16/21  1658 09/16/21  1540 09/16/21  1122 09/16/21  1020   WBC  --  9.8  --   --   --   --  9.3   HGB  --  12.9*  --   --   --   --  15.5   MCV  --  92  --   --   --   --  91   PLT  --  208  --   --   --   --  229   INR  --   --   --   --   --   --  0.92   NA  --  138  --   --   --   --  135   POTASSIUM  --  4.4  --   --   --   --  4.4   CHLORIDE  --  109  --   --   --   --  105   CO2  --  22  --   --   --   --  23   BUN  --  51*  --   --   --   --  44*   CR  --  2.17*  --    --  1.90*  --  1.76*   ANIONGAP  --  7  --   --   --   --  7   DESMOND  --  8.3*  --   --   --   --  8.8   * 223* 237*   < >  --    < > 185*   ALBUMIN  --   --   --   --   --   --  3.3*   PROTTOTAL  --   --   --   --   --   --  7.1   BILITOTAL  --   --   --   --   --   --  0.5   ALKPHOS  --   --   --   --   --   --  96   ALT  --   --   --   --   --   --  37   AST  --   --   --   --   --   --  23   LIPASE  --   --   --   --   --   --  42*   TROPONIN  --   --   --   --   --   --  <0.015    < > = values in this interval not displayed.     No results found for this or any previous visit (from the past 24 hour(s)).  Medications     insulin pump Stopped (09/16/21 0228)     sodium chloride 100 mL/hr at 09/17/21 9457       brimonidine-timolol  1 drop Right Eye BID     dorzolamide  1 drop Right Eye TID     enoxaparin ANTICOAGULANT  40 mg Subcutaneous Q24H     [Held by provider] hydrochlorothiazide  25 mg Oral QPM     insulin aspart  1-7 Units Subcutaneous TID AC     insulin aspart  1-5 Units Subcutaneous At Bedtime     levothyroxine  137 mcg Oral QPM     [Held by provider] lisinopril  40 mg Oral QPM     metoprolol tartrate  50 mg Oral BID     rosuvastatin  10 mg Oral QPM     sodium chloride (PF)  3 mL Intracatheter Q8H

## 2021-09-17 NOTE — UTILIZATION REVIEW
[facility] Utilization Review  Admission Status; Secondary Review Determination     Admission Date: 9/16/2021  9:24 AM      Under the authority of the Utilization Management Committee, the utilization review process indicated a secondary review on the above patient.  The review outcome is based on review of the medical records, discussions with staff, and applying clinical experience noted on the date of the review.        (X)      Inpatient Status Appropriate - This patient's medical care is consistent with medical management for inpatient care and reasonable inpatient medical practice.          RATIONALE FOR DETERMINATION   61-year-old male with history of diabetes mellitus on insulin pump, hypertension, admitted with acute metabolic encephalopathy due to hypoglycemia, also has hypothermia, acute kidney injury with acidosis.  Patient found to have blood sugar of 50 at home, received dextrose, has not been able to manage insulin pump well at home due to poor vision, continued on insulin pump, aggressive blood sugar monitoring.  Started on IV fluid hydration, renal ultrasound with bladder scan, holding ACE inhibitor and HCTZ.  Complex patient who needs close monitoring in the hospital with aggressive blood sugar, creatinine monitoring, anticipate more than 2 midnight hospital stay, recommend continue inpatient status      The severity of illness, intensity of service provided, expected LOS and risk for adverse outcome make the care complex, high risk and appropriate for hospital admission.The patient requires hospital based medical care which is anticipated to require a stay of 2 or more midnights; according to CMS guidelines the patient should be admitted as inpatient        The information on this document is developed by the utilization review team in order for the business office to ensure compliance.  This only denotes the appropriateness of proper admission status and does not reflect the quality  of care rendered.         The definitions of Inpatient Status and Observation Status used in making the determination above are those provided in the CMS Coverage Manual, Chapter 1 and Chapter 6, section 70.4.      Sincerely,       Dipika Bello MD  Physician Advisor  Utilization Review-[place]    Phone: [phone]

## 2021-09-17 NOTE — PROGRESS NOTES
Inpatient Physical Therapy Evaluation    Name: Homer Triana MRN# [id number]   Age: 61 year old YOB: 1960     Date of Consultation: 2021  Consultation is requested by:  Dr. Sanchez  Specific Consultation Request:  Weakness   Primary care provider: Paula Ref-Primary, Physician    General Information:   Onset Date: 21    History of Current Problem/Admission: Acidosis [E87.2]  Hypothermia, initial encounter [T68.XXXA]  Acute metabolic encephalopathy due to hypoglycemia [G93.41, E16.2]    Prior Level of Function: Pt reports he is mod I with mobility around his home. Reports he has had a little more difficulty in the last couple of months  Ambulation: 1 - Assistive Equipment   Transferrin - Assistive Equipment   Toiletin - Independent    Bathin - Not assessed  Dressin - Not assessed  Eatin - Independent   Communication: 0 - Understands/communicates without difficulty  Swallowin - swallows foods/liquids without difficulty  Cognition: 0 - no cognitive issues reported    Fall history within the last 6 months: Yes    Current Living Situation: Patient resides in a single story home with a couple of stairs to enter.     Current Equipment Used at Home: Cane, has AFOs but doesn't use them     Patient & Family Goals: Return home    Past Medical History:   Past Medical History:   Diagnosis Date     Diabetes (H)     type 1   per patient report.  Diagnosed at age 32 something. currently on insulin pump     Diabetic peripheral neuropathy (H)      Hyperlipidemia      Hypertension      Hypothyroid        Past Surgical History:  Past Surgical History:   Procedure Laterality Date     CHOLECYSTECTOMY       COLONOSCOPY - HIM SCAN  2015    Colonoscopy, Dr Huber 2015       Medications:   Current Facility-Administered Medications   Medication     brimonidine-timolol (COMBIGAN) 0.2-0.5 % ophthalmic solution 1 drop     glucose gel 15-30 g    Or     dextrose 50 % injection  25-50 mL    Or     glucagon injection 1 mg     dorzolamide (TRUSOPT) 2 % ophthalmic solution 1 drop     enoxaparin ANTICOAGULANT (LOVENOX) injection 40 mg     [Held by provider] hydrochlorothiazide (HYDRODIURIL) tablet 25 mg     insulin aspart (NovoLOG) injection (RAPID ACTING)     insulin aspart (NovoLOG) injection (RAPID ACTING)     levothyroxine (SYNTHROID/LEVOTHROID) tablet 137 mcg     lidocaine (LMX4) cream     lidocaine 1 % 0.1-1 mL     [Held by provider] lisinopril (ZESTRIL) tablet 40 mg     melatonin tablet 1 mg     metoprolol tartrate (LOPRESSOR) tablet 50 mg     Novolog Insulin Pump     ondansetron (ZOFRAN-ODT) ODT tab 4 mg    Or     ondansetron (ZOFRAN) injection 4 mg     rosuvastatin (CRESTOR) tablet 10 mg     sodium chloride (PF) 0.9% PF flush 3 mL     sodium chloride (PF) 0.9% PF flush 3 mL     sodium chloride 0.9% infusion       Weight Bearing Status: WBAT     Cognitive Status Examination:  Orientation: awake and alert  Level of Consciousness: alert  Follows Commands and Answers Questions: 100% of the time  Personal Safety and Judgement: Intact  Memory: Immediate recall intact  Comments:     Pain:   Currently in pain? No  Pain Location?   Pain Rating:     Physical Therapy Evaluation:   Integumentary/Edema: NT  ROM: WFL  Strength: BLE strength impaired, grossly 4- to 4/5 other than ankle dorsiflexion 2+/5 bilaterally   Bed Mobility: Independent  Transfers: SBA  Gait: Pt ambulated 200' c FWW, 200' c no assistive device. Pt did not have any major losses of balance during either bout of ambulation. Declines walker at home. Reports he does better in familiar environments due to visual impairments  Balance: Fair  Sensory: NT  Coordination: No impairments observed    Physical Therapy Interventions: Gait Training , Strengthening, Stretching  and Progressive Activity/Exercise     Clinical Impressions:  Criteria for Skilled Therapeutic Intervention Met: Yes, treatment indicated  PT Diagnosis:  Weakness  Influenced by the following impairments: h/o falls, visual impairments, LE weakness  Functional limitations due to impairment: Decreased tolerance for functional mobility , increased risk for falls  Clinical presentation: Evolving/Changing  Clinical presentation rationale: Clinical judgement  Clinical Decision making (complexity): Moderate Complexity  Frequency: 6 times/week  Predicted Duration of Therapy Intervention (days/wks): 5 days    Anticipated Discharge Disposition: Home with Assist and Home with Home Therapy  Anticipated Equipment Needs at Discharge:   Risks and Benefits of therapy have been explained: Yes  Patient, Family & other staff in agreement with plan of care: Yes  Clinical Impression Comments: PT evaluation completed for weakness. Pt would benefit from using AFOs at home however he reports that they are not comfortable. Recommend pt return to maker of AFOs for adjustment due to DF weakness that is contributing to falls. Pt would also benefit from using an assistive device at home. Offered to get pt a walker however he doesn't think it would fit in his house. Recommend pt use cane. Pt would also benefit from home for home safety assessment as well as to work on strength and balance. Plan to treat pt during his stay.     Total Eval Time: 20

## 2021-09-17 NOTE — PLAN OF CARE
"/80 (BP Location: Left arm)   Pulse 84   Temp 97.8  F (36.6  C) (Tympanic)   Resp 18   Ht 1.778 m (5' 10\")   Wt 117 kg (257 lb 15 oz)   SpO2 93%   BMI 37.01 kg/m      Pt is A&O. Denies pain or SOB. Up with SBA. Pt ambulated in room this afternoon, steady on feet. , 307, and 317 (post). Covered accordingly. Declined lunch stating he had a large breakfast. Trace edema to LLE. IVF infusing @ 100mL/hr. Pt makes needs known appropriately.     Face to face report given with opportunity to observe patient.    Report given to EZEQUIEL Nolasco RN   9/17/2021  3:15 PM    "

## 2021-09-17 NOTE — PROGRESS NOTES
Inpatient Occupational Therapy Evaluation    Name: Homer Triana MRN# [id number]   Age: 61 year old YOB: 1960     Date of Consultation: 2021  Consultation is requested by: Dr. Sanchez  Specific Consultation Request: weakness  Primary care provider: Paula Ref-Primary, Physician    General Information:   Onset Date: 2021    History of Current Problem/Admission: Acidosis [E87.2]  Hypothermia, initial encounter [T68.XXXA]  Acute metabolic encephalopathy due to hypoglycemia [G93.41, E16.2]    Prior Level of Function:   Ambulation: 1 - Assistive Equipment   Transferrin - Assistive Equipment   Toiletin - Independent    Bathin - Independent   Dressin - Independent   Eatin - Independent   Communication: 0 - Understands/communicates without difficulty  Swallowin - swallows foods/liquids without difficulty  Cognition: 0 - no cognitive issues reported    Fall history within the last 6 months: Yes    Current Living Situation: Pt lives alone in a 1-story house with a basement with ~4-5 steps to enter. Laundry is located in the basement, along with pt's cat litter boxes.  Bathroom has a regular height toilet and shower/tub combo, no grab bars, no shower chair.     Current Equipment Used at Home: Cane, insulin pump    Patient & Family Goals: return home     Past Medical History:   Past Medical History:   Diagnosis Date     Diabetes (H)     type 1   per patient report.  Diagnosed at age 32 something. currently on insulin pump     Diabetic peripheral neuropathy (H)      Hyperlipidemia      Hypertension      Hypothyroid        Past Surgical History:  Past Surgical History:   Procedure Laterality Date     CHOLECYSTECTOMY       COLONOSCOPY - HIM SCAN  2015    Colonoscopy, Dr Huber 2015       Medications:   Current Facility-Administered Medications   Medication     brimonidine-timolol (COMBIGAN) 0.2-0.5 % ophthalmic solution 1 drop     glucose gel 15-30 g    Or      dextrose 50 % injection 25-50 mL    Or     glucagon injection 1 mg     dorzolamide (TRUSOPT) 2 % ophthalmic solution 1 drop     enoxaparin ANTICOAGULANT (LOVENOX) injection 40 mg     [Held by provider] hydrochlorothiazide (HYDRODIURIL) tablet 25 mg     insulin aspart (NovoLOG) injection (RAPID ACTING)     insulin aspart (NovoLOG) injection (RAPID ACTING)     levothyroxine (SYNTHROID/LEVOTHROID) tablet 137 mcg     lidocaine (LMX4) cream     lidocaine 1 % 0.1-1 mL     [Held by provider] lisinopril (ZESTRIL) tablet 40 mg     melatonin tablet 1 mg     metoprolol tartrate (LOPRESSOR) tablet 50 mg     Novolog Insulin Pump     ondansetron (ZOFRAN-ODT) ODT tab 4 mg    Or     ondansetron (ZOFRAN) injection 4 mg     rosuvastatin (CRESTOR) tablet 10 mg     sodium chloride (PF) 0.9% PF flush 3 mL     sodium chloride (PF) 0.9% PF flush 3 mL     sodium chloride 0.9% infusion        Cognitive Status Examination:  Orientation: awake and alert  Level of Consciousness: alert  Follows Commands and Answers Questions: 100% of the time  Personal Safety and Judgement: Intact  Memory: Immediate recall intact and Long-term memory intact  Comments: no concerns noted with cognition     Pain:   Currently in pain? No  Pain Location?   Pain Ratin (No pain)    Occupational Therapy Evaluation:   Range of Motion: BUE's WNL   Strength: BUE's grossly 4-5/5  Hand Strength: Bilateral gross grasp good  Muscle Tone Assessment: no issues observed  Coordination: Normal    Mobility:   Bed Mobility: Independent  Transfer Skills: SBA  Bed to Chair/Chair to Bed: SBA  Toilet Transfer: SBA   Balance: Good with support, fair without support     ADLs:   Lower Body Dressing: SBA  Toileting: Mod (I)    IADLs:   Previous Responsibilities of the Patient: Meal Prep, Laundry, Shopping, Medication Management and Finances   Comments: Pt does not drive due to visual deficits, he gets rides from friends (used the bus prior to pandemic). Pt has a  (sister);  sister also assists with laundry occasionally. Pt orders out a lot, does simple meal prep. Pt has assist for yard work from neighbors.    Activities of Daily Living Analysis:   Impairments Contributing to Impaired Activities of Daily Living: generalized weakness, deconditioning, decreased vision     Occupational Therapy Interventions: ADL Retraining , IADL retraining, strengthening , progressive activity/exercise and risk factor education     Clinical Impressions:  Criteria for Skilled Therapeutic Intervention Met: Yes, treatment indicated  OT Diagnosis: Impaired ADLs  Influenced by the following impairments: generalized weakness, deconditioning, decreased vision  Functional limitations due to impairment: decreased safety and independence in ADLs and IADLs  Clinical presentation: Evolving/Changing  Clinical presentation rationale: Clinical judgement  Clinical Decision making (complexity): Moderate Complexity  Frequency: 5 times/week  Predicted Duration of Therapy Intervention (days/wks): 5 days    Anticipated Discharge Disposition: Home with Assist and and home therapy safety assessment (PT or OT)  Anticipated Equipment Needs at Discharge:   Risks and Benefits of therapy have been explained: Yes  Patient, Family & other staff in agreement with plan of care: Yes  Clinical Impression Comments: Pt doing fairly well with ADLs but would benefit from ongoing skilled OT intervention during his acute care stay to progress strength and endurance for maximal participation in ADLs and IADLs for an eventual, safe return home. He may benefit from tips to improve safety with his decreased vision, as well. Pt would benefit from a home safety assessment due to currently not having any equipment in , which he does want and feel would help him. Pt discussed having [facility] come to his home to modify. This assessment can be done by PT or OT.     Total Eval Time: 20

## 2021-09-17 NOTE — PROGRESS NOTES
09/17/21 1400   Signing Clinician's Name / Credentials   Signing clinician's name / credentials TYRON Olivo Adds   Rehab Discipline PT   PT Discharge Planning    PT Discharge Recommendation (DC Rec) home with home care physical therapy   PT Rationale for DC Rec PT evaluation completed for weakness. Pt would benefit from using AFOs at home however he reports that they are not comfortable. Recommend pt return to maker of AFOs for adjustment due to DF weakness that is contributing to falls. Pt would also benefit from using an assistive device at home. Offered to get pt a walker however he doesn't think it would fit in his house. Recommend pt use cane. Pt would also benefit from home for home safety assessment as well as to work on strength and balance. Plan to treat pt during his stay.    PT Brief overview of current status  Pt ambulated 200' c FWW, 200' c no assistive device. Pt did not have any major losses of balance during either bout of ambulation. Declines walker at home. Reports he does better in familiar environments due to visual impairments   Additional Documentation   PT Plan Progress mobility as tolerated

## 2021-09-17 NOTE — PROGRESS NOTES
Assessment completed by face to face visit with Homer.    LOC: alert & oriented x 4. Pleasant with conversation.     Dx: Metabolic encephalopathy, hypoglycemia, hypothermia  Chronic Disease Management: diabetes, diabetic peripheral neuropathy, hyperlipidemia, hypertension, hypothyroidism, diabetic neuropathy    Lives with: alone  Living at:  Home in [place]  Transportation: YES Neighbors, taxi, bus. His neighbor will be picking him up from the hospital.     Primary PCP: Would like to establish care with Priti Salinas, since he will be seeing her for diabetic education.   Insurance:  Medicare  Medicare IMM Admit letter reviewed with Homer.    Support System:  Neighbors and family  Homecare/PCA: No  /[facility]:   No  [place]: NO      How was the [facility] notification completed: N/A    Health Care Directive: No  Guardian: Self  POA: No    Pharmacy: [facility] or [facility] in [place]  Meds management: Independent    Adequate Resources for needs (housing, utilities, food/med): YES  Household chores: Sister helps with housekeeping.   Work/community/social activity: YES as desired    ADLs: Independent  Ambulation:Independent  Falls: Several on stairs due to vision impairment and neuropathy  Nutrition: Reports no nutritional concerns  Sleep: Reports no concerns with sleep    Equipment used: None      Oxygen supplier: N/A      Does the supplier have valid oxygen orders: N/A    Mental health: Reports no mental health concerns  Substance abuse: Denies  Exposure to violence/abuse: Denies  Stressors: Denies    Able to Return to Prior Living Arrangements: YES    Choice of Vendor: Pt/family was provided with the Medicare Compare list for Home Care.  Discussed associated Medicare star ratings to assist with choice for referrals/discharge planning Yes    Education was given to pt/family that star ratings are updated/maintained by Medicare and can be reviewed by visiting www.medicare.gov Yes    Patient/family  choices for facility in priority are:   First Choice : [facility] [place]: [facility]                 [phone]    Second Choice: Home Care [place]: [facility]                                                      [phone]      Barriers: None noted at this time.     FLOR[place]: Low    Plan: Anticipate discharge to home with a home care safety assessment.

## 2021-09-17 NOTE — PROGRESS NOTES
09/17/21 1430   Signing Clinician's Name / Credentials   Signing clinician's name / credentials Temi Meza, OTR/L   Quick Adds   Rehab Discipline OT   OT Discharge Planning    OT Discharge Recommendation (DC Rec) Home with assist  (home therapy (PT or OT) for home safety assessment)   OT Rationale for DC Rec Pt doing fairly well with ADLs but would benefit from ongoing skilled OT intervention during his acute care stay to progress strength and endurance for maximal participation in ADLs and IADLs for an eventual, safe return home. He may benefit from tips to improve safety with his decreased vision, as well. Pt would benefit from a home safety assessment due to currently not having any equipment in BR, which he does want and feel would help him. Pt discussed having [facility] come to his home to modify. This assessment can be done by PT or OT.   OT Brief overview of current status  SBA, generalized weakness and deconditioning noted   Additional Documentation   OT Plan Progress strength, endurance, discuss visual impairment more/any concerns at home

## 2021-09-17 NOTE — PLAN OF CARE
Face to face report given with opportunity to observe patient.    Report given to EZEQUIEL Linton RN   9/17/2021  7:14 AM

## 2021-09-18 VITALS
WEIGHT: 257.94 LBS | OXYGEN SATURATION: 96 % | HEART RATE: 77 BPM | HEIGHT: 70 IN | BODY MASS INDEX: 36.93 KG/M2 | DIASTOLIC BLOOD PRESSURE: 97 MMHG | TEMPERATURE: 97.5 F | SYSTOLIC BLOOD PRESSURE: 182 MMHG | RESPIRATION RATE: 16 BRPM

## 2021-09-18 PROBLEM — E10.649 HYPOGLYCEMIA DUE TO TYPE 1 DIABETES MELLITUS (H): Status: ACTIVE | Noted: 2021-09-18

## 2021-09-18 LAB
ANION GAP SERPL CALCULATED.3IONS-SCNC: 4 MMOL/L (ref 3–14)
BASOPHILS # BLD AUTO: 0 10E3/UL (ref 0–0.2)
BASOPHILS NFR BLD AUTO: 1 %
BUN SERPL-MCNC: 41 MG/DL (ref 7–30)
CALCIUM SERPL-MCNC: 8 MG/DL (ref 8.5–10.1)
CHLORIDE BLD-SCNC: 114 MMOL/L (ref 94–109)
CO2 SERPL-SCNC: 23 MMOL/L (ref 20–32)
CREAT SERPL-MCNC: 1.39 MG/DL (ref 0.66–1.25)
EOSINOPHIL # BLD AUTO: 0.2 10E3/UL (ref 0–0.7)
EOSINOPHIL NFR BLD AUTO: 3 %
ERYTHROCYTE [DISTWIDTH] IN BLOOD BY AUTOMATED COUNT: 12.2 % (ref 10–15)
GFR SERPL CREATININE-BSD FRML MDRD: 55 ML/MIN/1.73M2
GLUCOSE BLD-MCNC: 268 MG/DL (ref 70–99)
GLUCOSE BLDC GLUCOMTR-MCNC: 220 MG/DL (ref 70–99)
GLUCOSE BLDC GLUCOMTR-MCNC: 240 MG/DL (ref 70–99)
GLUCOSE BLDC GLUCOMTR-MCNC: 266 MG/DL (ref 70–99)
HCT VFR BLD AUTO: 36.3 % (ref 40–53)
HGB BLD-MCNC: 12.5 G/DL (ref 13.3–17.7)
IMM GRANULOCYTES # BLD: 0 10E3/UL
IMM GRANULOCYTES NFR BLD: 1 %
LYMPHOCYTES # BLD AUTO: 1.4 10E3/UL (ref 0.8–5.3)
LYMPHOCYTES NFR BLD AUTO: 23 %
MCH RBC QN AUTO: 31.8 PG (ref 26.5–33)
MCHC RBC AUTO-ENTMCNC: 34.4 G/DL (ref 31.5–36.5)
MCV RBC AUTO: 92 FL (ref 78–100)
MONOCYTES # BLD AUTO: 0.6 10E3/UL (ref 0–1.3)
MONOCYTES NFR BLD AUTO: 11 %
NEUTROPHILS # BLD AUTO: 3.8 10E3/UL (ref 1.6–8.3)
NEUTROPHILS NFR BLD AUTO: 61 %
NRBC # BLD AUTO: 0 10E3/UL
NRBC BLD AUTO-RTO: 0 /100
PLATELET # BLD AUTO: 197 10E3/UL (ref 150–450)
POTASSIUM BLD-SCNC: 4.5 MMOL/L (ref 3.4–5.3)
RBC # BLD AUTO: 3.93 10E6/UL (ref 4.4–5.9)
SODIUM SERPL-SCNC: 141 MMOL/L (ref 133–144)
WBC # BLD AUTO: 6 10E3/UL (ref 4–11)

## 2021-09-18 PROCEDURE — 82374 ASSAY BLOOD CARBON DIOXIDE: CPT | Performed by: HOSPITALIST

## 2021-09-18 PROCEDURE — 99239 HOSP IP/OBS DSCHRG MGMT >30: CPT | Performed by: NURSE PRACTITIONER

## 2021-09-18 PROCEDURE — 250N000013 HC RX MED GY IP 250 OP 250 PS 637: Performed by: HOSPITALIST

## 2021-09-18 PROCEDURE — 85004 AUTOMATED DIFF WBC COUNT: CPT | Performed by: HOSPITALIST

## 2021-09-18 PROCEDURE — 250N000013 HC RX MED GY IP 250 OP 250 PS 637: Performed by: NURSE PRACTITIONER

## 2021-09-18 PROCEDURE — 258N000003 HC RX IP 258 OP 636: Performed by: HOSPITALIST

## 2021-09-18 PROCEDURE — 36415 COLL VENOUS BLD VENIPUNCTURE: CPT | Performed by: HOSPITALIST

## 2021-09-18 RX ORDER — METOPROLOL TARTRATE 25 MG/1
25 TABLET, FILM COATED ORAL ONCE
Status: COMPLETED | OUTPATIENT
Start: 2021-09-18 | End: 2021-09-18

## 2021-09-18 RX ADMIN — DORZOLAMIDE HYDROCHLORIDE 1 DROP: 20 SOLUTION/ DROPS OPHTHALMIC at 08:51

## 2021-09-18 RX ADMIN — BRIMONIDINE TARTRATE, TIMOLOL MALEATE 1 DROP: 2; 5 SOLUTION/ DROPS OPHTHALMIC at 08:51

## 2021-09-18 RX ADMIN — METOPROLOL TARTRATE 25 MG: 25 TABLET, FILM COATED ORAL at 08:50

## 2021-09-18 RX ADMIN — SODIUM CHLORIDE: 9 INJECTION, SOLUTION INTRAVENOUS at 01:52

## 2021-09-18 RX ADMIN — METOPROLOL TARTRATE 50 MG: 50 TABLET, FILM COATED ORAL at 08:50

## 2021-09-18 ASSESSMENT — ACTIVITIES OF DAILY LIVING (ADL)
ADLS_ACUITY_SCORE: 13
ADLS_ACUITY_SCORE: 14
ADLS_ACUITY_SCORE: 13
ADLS_ACUITY_SCORE: 14

## 2021-09-18 NOTE — DISCHARGE SUMMARY
[facility]    Discharge Summary  Hospitalist    Date of Admission:  9/16/2021  Date of Discharge:  9/18/2021 12:37 PM  Discharging Provider: Sandra Flores CNP  Date of Service (when I saw the patient): 9/18/21    Discharge Diagnoses   Principal Problem:    Hypoglycemia due to type 1 diabetes mellitus (H)  Active Problems:    Acute kidney injury (H)    Hypothermia, initial encounter    Acute metabolic encephalopathy due to hypoglycemia    Essential hypertension      History of Present Illness   From admission: Homer Triana is a 61 year old male who  is a type I diabetic with insulin pump, patient has a history of glaucoma and decreased vision.  Patient does see ophthalmology and has a retinologist.  Patient does have issues with using his insulin pump at times as he has to use a magnifying glass to give himself boluses.  Patient last night was in the dark and he stated that he used his insulin pump and started feel chills and he started yelling for help.  In the morning today patient was found by the meter elio, ambulance was called and patient was found to have glucose of 50 subsequently given D50, he was brought to the emergency room he was found to have hypothermia temperature of 92 Fahrenheit .  Was placed on a Susi hugger.  His initial work-up in the emergency room was negative.  He continues his insulin pump.  He was evaluated by his endocrinologist through tele 7 on September 2 he is not had any adjustments to his insulin pump.  Patient denied any fevers at home no pain.  No falls.  Patient is a full code.    Hospital Course       Hypoglycemia due to type 1 diabetes mellitus (H): Patient reports he replaced his pump in the middle of the night because it was empty and accidentally bolused himself because he pre-primed the tubing and then had the pump prime it after it was already in place. The pump has been in place since his arrival and has been working correctly, glucose has not  dropped below normal. He will follow-up with diabetic education, hopefully he can get a CGM that they have been working on. He is advised to bolus half dose after 7pm so that he does not drop too low during the night when he is sleeping.       Acute kidney injury (H): Due to hypoglycemia/hypothermia, probably hypovolemia. Resolved, his renal function is back to baseline. Restart his normal blood pressure medications including lisinopril.      Hypothermia, initial encounter: Due to hypoglycemia.       Acute metabolic encephalopathy due to hypoglycemia: Resolved.      Essential hypertension: Continue home medications.       Sandra Flores CNP        Pending Results     Unresulted Labs Ordered in the Past 30 Days of this Admission     No orders found from 8/17/2021 to 9/17/2021.          Code Status   Full Code       Primary Care Physician   Physician No Ref-Primary    Discharge Disposition   Discharged to home  Condition at discharge: Stable    Consultations This Hospital Stay   DIABETES EDUCATION IP CONSULT  PHYSICAL THERAPY ADULT IP CONSULT  OCCUPATIONAL THERAPY ADULT IP CONSULT    Time Spent on this Encounter   I, Sandra Flores NP, personally saw the patient today and spent greater than 30 minutes discharging this patient.    Discharge Orders      Reason for your hospital stay    Hypoglycemia     Follow-up and recommended labs and tests     Follow up with primary care provider, within 7 days for hospital follow- up.  No follow up labs or test are needed.     Activity    Your activity upon discharge: activity as tolerated     Diet    Follow this diet upon discharge: Orders Placed This Encounter      Consistent Carbohydrate Diet Consistent Carb 45 grams CHO per Meal Diet     Discharge Medications   Current Discharge Medication List      CONTINUE these medications which have NOT CHANGED    Details   blood glucose (ACCU-CHEK GUIDE) test strip 1 strip by In Vitro route 4 times daily      Blood Glucose  Monitoring Suppl (ACCU-CHEK GUIDE) w/Device KIT by In Vitro route 4 times daily      COMBIGAN 0.2-0.5 % ophthalmic solution Place 1 drop into the right eye 2 times daily      dorzolamide (TRUSOPT) 2 % ophthalmic solution Place 1 drop into the right eye 3 times daily      hydrochlorothiazide (HYDRODIURIL) 25 MG tablet Take 25 mg by mouth every evening       INSULIN PUMP - OUTPATIENT Insulin Pump Brand: Medtronic [id number] G  Pump Basal Rates-Times:1.3 units/hour at midnight, 1.1 at 3:00 a.m., 1.1 at noon, and 1.2 at 3:00 p.m  CARB RATIO: 15  Correction factor: 35   Pt and Provider established A1C goal: 8.0  Active Insulin Time:  4 hours  Average total daily dose of insulin is 44 units, 64% basal and 36% bolus insulin  Provider: Lindsay J Schwab-Peterson, CNP      levothyroxine (SYNTHROID/LEVOTHROID) 137 MCG tablet Take 137 mcg by mouth every evening       lisinopril (PRINIVIL/ZESTRIL) 40 MG tablet Take 40 mg by mouth every evening       metoprolol tartrate (LOPRESSOR) 50 MG tablet Take 50 mg by mouth 2 times daily       MULTI MINERALS-AMINO ACIDS PO Take 1 tablet by mouth every evening       Multiple Vitamins-Minerals (MENS MULTIVITAMIN PO) Take 1 tablet by mouth every evening      NOVOLOG VIAL 100 UNIT/ML soln Inject 80 Units Subcutaneous continuous with insulin pump.      Nutritional Supplements (GLUCOSE MANAGEMENT) TABS Take 1 tablet by mouth daily as needed (low lood glucose)      rosuvastatin (CRESTOR) 10 MG tablet Take 10 mg by mouth every evening       Vitamin D3 (CHOLECALCIFEROL) 125 MCG (5000 UT) tablet Take 125 mcg by mouth every evening       BAQSIMI TWO PACK 3 MG/DOSE POWD Apply 3 mg into one nostril as directed once as needed      insulin glargine (LANTUS PEN) 100 UNIT/ML pen Inject 25 Units Subcutaneous daily as needed for other . Take 25 units within 1-2 hours of pump failure and every 24 hours           Allergies   No Known Allergies  Data   Most Recent 3 CBC's:Recent Labs   Lab Test 09/18/21  0648  09/17/21  0452 09/16/21  1020   WBC 6.0 9.8 9.3   HGB 12.5* 12.9* 15.5   MCV 92 92 91    208 229      Most Recent 3 BMP's:  Recent Labs   Lab Test 09/18/21  1149 09/18/21  0856 09/18/21  0648 09/17/21  0815 09/17/21  0452 09/16/21  1658 09/16/21  1540 09/16/21  1122 09/16/21  1020   NA  --   --  141  --  138  --   --   --  135   POTASSIUM  --   --  4.5  --  4.4  --   --   --  4.4   CHLORIDE  --   --  114*  --  109  --   --   --  105   CO2  --   --  23  --  22  --   --   --  23   BUN  --   --  41*  --  51*  --   --   --  44*   CR  --   --  1.39*  --  2.17*  --  1.90*   < > 1.76*   ANIONGAP  --   --  4  --  7  --   --   --  7   DESMOND  --   --  8.0*  --  8.3*  --   --   --  8.8   * 220* 268*   < > 223*   < >  --    < > 185*    < > = values in this interval not displayed.     Most Recent 2 LFT's:  Recent Labs   Lab Test 09/16/21  1020 11/22/18  1537   AST 23 33   ALT 37 39   ALKPHOS 96 100   BILITOTAL 0.5 1.1*     Most Recent INR's and Anticoagulation Dosing History:  Anticoagulation Dose History     Recent Dosing and Labs Latest Ref Rng & Units 11/22/2018 9/16/2021    INR 0.85 - 1.15 0.83 0.92        Most Recent 3 Troponin's:  Recent Labs   Lab Test 09/16/21  1020 03/20/19  1632 10/06/18  1710   TROPI  --  <0.015 <0.015   TROPONIN <0.015  --   --      Most Recent Cholesterol Panel:  Recent Labs   Lab Test 10/25/18  0000   CHOL 182  182   LDL 98   HDL 64  64   TRIG 99     Most Recent 6 Bacteria Isolates From Any Culture (See EPIC Reports for Culture Details):  Recent Labs   Lab Test 10/06/18  1709   CULT No growth after 6 days  1 of 2 bottles  Coagulase negative Staphylococcus  Susceptibility testing not routinely done  *  Critical Value called to and read back by  Ana Rankin @ 5449 on 10/09/2018 by AM  Gram stain of bottle 1 called    Gram stain review consistent with reported results.     Most Recent TSH, T4 and A1c Labs:  Recent Labs   Lab Test 09/16/21  1020 03/20/19  1632   TSH 3.52  --    A1C  --   8.2*     Results for orders placed or performed during the hospital encounter of 09/16/21   XR Chest Port 1 View    Narrative    PROCEDURE: XR CHEST PORT 1 VIEW 9/16/2021 10:02 AM    HISTORY: dyspnea    COMPARISONS: 10/6/2018.    TECHNIQUE: Single portable view.    FINDINGS: Heart is unenlarged. Lungs are clear and no pleural effusion  is seen.         Impression    IMPRESSION: No acute infiltrate.    MJ COBURN MD         SYSTEM ID:  [id number]   CT Head w/o Contrast    Narrative    PROCEDURE: CT HEAD W/O CONTRAST 9/16/2021 9:56 AM    HISTORY: Transient ischemic attack (TIA)    COMPARISONS: None.    Meds/Dose Given: None.    TECHNIQUE: Axial noncontrast enhanced images with coronal and sagittal  reformatted images.    FINDINGS: Ventricles, sulci and basilar cisterns are normal in size  for patient of this age. No mass or midline shift is seen and there is  no extra-axial fluid collection or focal hemorrhage.    Bone windows show no fracture. Visualized paranasal sinuses and  mastoid air cells are clear.         Impression    IMPRESSION: No intracranial mass effect or hemorrhage.    MJ COBURN MD         SYSTEM ID:  [id number]   US Renal Complete    Narrative    PROCEDURE: US RENAL COMPLETE    HISTORY: . Renal insufficiency    TECHNIQUE: Targeted sonographic assessment of the kidneys and bladder  was performed.    COMPARISON:  None.    MEASUREMENTS:    Right renal length: 10.2 cm  Left renal length: 10.9 cm    RENAL FINDINGS: No renal masses or hydronephrosis is noted.    BLADDER: No bladder masses are seen. Only a small bladder volume was  noted.      Impression    IMPRESSION:  No renal masses or hydronephrosis.      СВЕТЛАНА KENNEDY MD         SYSTEM ID:  [id number]

## 2021-09-18 NOTE — PLAN OF CARE
Patient discharged at 12:30 PM via ambulation accompanied by staff. Prescriptions - None ordered for discharge. All belongings sent with patient.     Discharge instructions reviewed with patient. Listed belongings gathered and returned to patient. All belongings left with patient    Patient discharged to home.   Report called to NA    Surgical Patient   Surgical Procedures during stay: NA  Did patient receive discharge instruction on wound care and recognition of infection symptoms? N/A    MISC  Follow up appointment made:  Yes  Home medications returned to patient: N/A  Patient reports pain was well managed at discharge: Yes

## 2021-09-18 NOTE — PLAN OF CARE
Pt alert and orientated.   Ate well for supper.  Resting in bed.  IV infusing.  BS- 290, 293.   No pain.

## 2021-09-18 NOTE — PROGRESS NOTES
Physical Therapy Discharge Summary    Reason for therapy discharge:    Discharged to home with home therapy.    Progress towards therapy goal(s). See goals on Care Plan in [facility] electronic health record for goal details.  Goals partially met.  Barriers to achieving goals:   discharge from facility.    Therapy recommendation(s):    Continued therapy is recommended.  Rationale/Recommendations:  Home PT recommended for home safety assessment.

## 2021-09-18 NOTE — PLAN OF CARE
Pt A&Ox3. He reports mild back pain but declines any medications. Accu check 266 at 0200. Afebrile. HR 70s. /82. Greater than 92% on room air. Lung sounds clear. Bowel sounds active. IVF continued. Call light within reach and pt calls appropriately.     Face to face report given with opportunity to observe patient.    Report given to EZEQUIEL Linton RN   9/18/2021  7:21 AM

## 2021-09-18 NOTE — PLAN OF CARE
Face to face report given with opportunity to observe patient.    Report given to Tracie Magaña RN   9/17/2021  11:44 PM

## 2021-09-20 ENCOUNTER — ALLIED HEALTH/NURSE VISIT (OUTPATIENT)
Dept: EDUCATION SERVICES | Facility: OTHER | Age: 61
End: 2021-09-20
Attending: NURSE PRACTITIONER
Payer: MEDICARE

## 2021-09-20 ENCOUNTER — TELEPHONE (OUTPATIENT)
Dept: CARE COORDINATION | Facility: OTHER | Age: 61
End: 2021-09-20

## 2021-09-20 VITALS
HEIGHT: 69 IN | SYSTOLIC BLOOD PRESSURE: 124 MMHG | BODY MASS INDEX: 37.62 KG/M2 | HEART RATE: 77 BPM | WEIGHT: 254 LBS | OXYGEN SATURATION: 93 % | DIASTOLIC BLOOD PRESSURE: 84 MMHG | RESPIRATION RATE: 16 BRPM

## 2021-09-20 DIAGNOSIS — E10.649 HYPOGLYCEMIA DUE TO TYPE 1 DIABETES MELLITUS (H): Primary | ICD-10-CM

## 2021-09-20 PROCEDURE — 99215 OFFICE O/P EST HI 40 MIN: CPT | Performed by: NURSE PRACTITIONER

## 2021-09-20 PROCEDURE — G0463 HOSPITAL OUTPT CLINIC VISIT: HCPCS

## 2021-09-20 ASSESSMENT — PAIN SCALES - GENERAL: PAINLEVEL: NO PAIN (0)

## 2021-09-20 ASSESSMENT — MIFFLIN-ST. JEOR: SCORE: 1957.52

## 2021-09-20 NOTE — PROGRESS NOTES
"    Assessment & Plan     Hypoglycemia due to type 1 diabetes mellitus (H)  No further episodes of hypoglycemia since hospitalization. Reviewed pump download and noted extra bolus given during the night when he had his hypoglycemia.      Reviewed his BGs - discussed giving extra insulin before coffee and creamer.  Reviewed last notes from [facility] endocrine and A1c which was 8.1 on 9/9/21.  He is aware that he needs to work on better control of his blood sugars.  He would benefit from a CGM, but his insurance will not cover one.  Plan to have  call him and try to find an insurance during open enrollment that may have better coverage for him.     He does not have a PCP at this time and is encouraged to schedule an appointment for a PCP      Review of prior external note(s) from - Outside records from [facility]   40 minutes spent on the date of the encounter doing chart review, review of outside records, review of test results, interpretation of tests, patient visit and documentation        BMI:   Estimated body mass index is 37.51 kg/m  as calculated from the following:    Height as of this encounter: 1.753 m (5' 9\").    Weight as of this encounter: 115.2 kg (254 lb).   Weight management plan: Discussed healthy diet and exercise guidelines    Patient Instructions   Continue working on healthy eating and moving (start low and slow, work up to 30 min, 5x/week)    BG goals:  Fasting and before meals <130, >80  2 hour after eating <180    We only need 1/2 of these numbers to be within target then your A1c will be within target    Practicing health promotion can help improve diabetes (suggested by the ADA, March 2019)   Meditation    Apps: for IPhone and Android    -Calm    -Headspace    -Insight Timer   Yoga   Mindful eating     Medication changes   -add 1-2 units with morning coffee and cream     Follow up   Continue to follow up with Endocrine [facility]     Call me sooner if any " problems/concerns and/or questions develop including consistent low BGs <70 or consistent high BGs >200  [phone] (Unit Coordinator)    [phone] (Nurse)            No follow-ups on file.    ABBIE Berg [facility] - TOÑA Coronel is a 61 year old who presents for the following health issues     HPI       Hospital Follow-up Visit:    Hospital/Nursing Home/IP Rehab Facility: [facility]  Date of Admission: 9/16/21  Date of Discharge: 90/18/21  Reason(s) for Admission: hypoglycemia   hypothermia due to hypoblycemia   Was your hospitalization related to COVID-19? No   Problems taking medications regularly:  Poor vision - hard time seeing the pump to make some adjustments at times   Medication changes since discharge: None  Problems adhering to non-medication therapy:  None  Received Homer pump download    Auto Mode:  0(0%)  Manual Mode: 100 %    Average BG: unknown - he cannot see insulin pump to put in sugars or change settings   BG (per day): 4 times daily for over 30 days     Bolus: 15.7U(36%) every meal   Basal: 27.8U(64%)    Wednesday night/overnight - he was concerned he may have given himself an extra bolus on accident.  He changed his reservoir at 2 am and at that time there is a bolus of 6.3 units - which he did not mean to do.  He was not aware that he did bolus himself at that time- he only was planning on changing the reservoir     Basal Rate:  00:00      1.30  03:00      1.10  12:00      1.10  15:00      1.20      Adjusted Basal Rate:  No adjustment     Summary of hospitalization:  [facility] discharge summary reviewed  Diagnostic Tests/Treatments reviewed.  Follow up needed: none  Other Healthcare Providers Involved in Patient s Care:         None  Update since discharge: improved. Post Discharge Medication Reconciliation: discharge medications reconciled, continue medications without change.  Plan of care communicated  "with patient         BGs fasting - 160  Lunch 180-220 (he has coffee and cream in the morning- without bolus)  Supper 170-220  Bedtime 160    Review of Systems   CONSTITUTIONAL: NEGATIVE for fever, chills, change in weight  INTEGUMENTARY/SKIN: right forearm abrasion from tape   EYES: poor vision   RESP: NEGATIVE for significant cough or SOB  CV: NEGATIVE for chest pain, palpitations or peripheral edema  GI: loose stools chronic since gallbladder removed   : negative for dysuria, hematuria, decreased urinary stream  NEURO: NEGATIVE for weakness, dizziness or paresthesias  ENDOCRINE: Hx diabetes      Objective    /84   Pulse 77   Resp 16   Ht 1.753 m (5' 9\")   Wt 115.2 kg (254 lb)   SpO2 93%   BMI 37.51 kg/m    Body mass index is 37.51 kg/m .  Physical Exam   GENERAL: healthy, alert and no distress  NECK: no adenopathy, no asymmetry, masses, or scars and thyroid normal to palpation  RESP: lungs clear to auscultation - no rales, rhonchi or wheezes  CV: regular rate and rhythm, normal S1 S2, no S3 or S4, no murmur, click or rub, no peripheral edema and peripheral pulses strong  ABDOMEN: soft, nontender, no hepatosplenomegaly, no masses and bowel sounds normal  MS: no gross musculoskeletal defects noted, no edema  SKIN: abrasion right inner forearm  NEURO: Normal strength and tone, sensory exam grossly normal, mentation intact, speech normal and cranial nerves 2-12 intact  PSYCH: mentation appears normal, affect normal/bright                "

## 2021-09-20 NOTE — PATIENT INSTRUCTIONS
Continue working on healthy eating and moving (start low and slow, work up to 30 min, 5x/week)    BG goals:  Fasting and before meals <130, >80  2 hour after eating <180    We only need 1/2 of these numbers to be within target then your A1c will be within target    Practicing health promotion can help improve diabetes (suggested by the ADA, March 2019)   Meditation    Apps: for IPhone and Android    -Calm    -Headspace    -Insight Timer   Yoga   Mindful eating     Medication changes   -add 1-2 units with morning coffee and cream     Follow up   Continue to follow up with Endocrine [facility]     Call me sooner if any problems/concerns and/or questions develop including consistent low BGs <70 or consistent high BGs >200  [phone] (Unit Coordinator)    [phone] (Nurse)

## 2021-09-20 NOTE — PLAN OF CARE
Occupational Therapy Discharge Summary    Reason for therapy discharge:    Discharged to home with home therapy.    Progress towards therapy goal(s). See goals on Care Plan in [facility] electronic health record for goal details.  Goals not met.  Barriers to achieving goals:   discharge from facility.    Therapy recommendation(s):    Continued therapy is recommended.  Rationale/Recommendations:  pt would benefit from home eval for safety.

## 2021-09-20 NOTE — PROGRESS NOTES
Care Transitions focused note:      Priti Salinas referred patient to this SW.  He is struggling with his current drug plan.  We discussed his situation and talked through  A few things.  Gave patient this SW contact info so he can call and schedule with this SW after October 15 for open enrollment.  Patient thanked this SW for calling and said he would call to schedule.

## 2021-09-22 NOTE — PROGRESS NOTES
"Chief Complaint   Patient presents with     Diabetes       Initial /84   Pulse 77   Resp 16   Ht 1.753 m (5' 9\")   Wt 115.2 kg (254 lb)   SpO2 93%   BMI 37.51 kg/m   Estimated body mass index is 37.51 kg/m  as calculated from the following:    Height as of this encounter: 1.753 m (5' 9\").    Weight as of this encounter: 115.2 kg (254 lb).  Medication Reconciliation: complete  Tracie Palmer LPN    "

## 2021-10-04 ENCOUNTER — TRANSFERRED RECORDS (OUTPATIENT)
Dept: OTHER | Facility: HOSPITAL | Age: 61
End: 2021-10-04

## 2022-06-29 ENCOUNTER — LAB REQUISITION (OUTPATIENT)
Dept: LAB | Facility: HOSPITAL | Age: 62
End: 2022-06-29
Payer: MEDICARE

## 2022-06-29 DIAGNOSIS — E87.5 HYPERKALEMIA: ICD-10-CM

## 2022-06-29 LAB
ALBUMIN SERPL-MCNC: 3.6 G/DL (ref 3.4–5)
ALP SERPL-CCNC: 93 U/L (ref 40–150)
ALT SERPL W P-5'-P-CCNC: 33 U/L (ref 0–70)
ANION GAP SERPL CALCULATED.3IONS-SCNC: 3 MMOL/L (ref 3–14)
AST SERPL W P-5'-P-CCNC: 21 U/L (ref 0–45)
BILIRUB SERPL-MCNC: 0.8 MG/DL (ref 0.2–1.3)
BUN SERPL-MCNC: 34 MG/DL (ref 7–30)
CALCIUM SERPL-MCNC: 8.9 MG/DL (ref 8.5–10.1)
CHLORIDE BLD-SCNC: 106 MMOL/L (ref 94–109)
CO2 SERPL-SCNC: 28 MMOL/L (ref 20–32)
CREAT SERPL-MCNC: 1.5 MG/DL (ref 0.66–1.25)
GFR SERPL CREATININE-BSD FRML MDRD: 53 ML/MIN/1.73M2
GLUCOSE BLD-MCNC: 128 MG/DL (ref 70–99)
POTASSIUM BLD-SCNC: 5.3 MMOL/L (ref 3.4–5.3)
PROT SERPL-MCNC: 6.5 G/DL (ref 6.8–8.8)
SODIUM SERPL-SCNC: 137 MMOL/L (ref 133–144)

## 2022-06-29 PROCEDURE — 80053 COMPREHEN METABOLIC PANEL: CPT | Performed by: FAMILY MEDICINE

## 2024-07-04 ENCOUNTER — APPOINTMENT (OUTPATIENT)
Dept: CT IMAGING | Facility: HOSPITAL | Age: 64
DRG: 602 | End: 2024-07-04
Attending: NURSE PRACTITIONER
Payer: MEDICARE

## 2024-07-04 ENCOUNTER — HOSPITAL ENCOUNTER (INPATIENT)
Facility: HOSPITAL | Age: 64
LOS: 5 days | Discharge: HOME | DRG: 602 | End: 2024-07-09
Attending: NURSE PRACTITIONER | Admitting: INTERNAL MEDICINE
Payer: MEDICARE

## 2024-07-04 ENCOUNTER — APPOINTMENT (OUTPATIENT)
Dept: ULTRASOUND IMAGING | Facility: HOSPITAL | Age: 64
DRG: 602 | End: 2024-07-04
Attending: NURSE PRACTITIONER
Payer: MEDICARE

## 2024-07-04 DIAGNOSIS — J18.9 COMMUNITY ACQUIRED PNEUMONIA OF RIGHT LUNG, UNSPECIFIED PART OF LUNG: ICD-10-CM

## 2024-07-04 DIAGNOSIS — N17.0 ACUTE KIDNEY FAILURE WITH TUBULAR NECROSIS (H): Primary | ICD-10-CM

## 2024-07-04 DIAGNOSIS — J96.01 ACUTE RESPIRATORY FAILURE WITH HYPOXIA (H): ICD-10-CM

## 2024-07-04 PROBLEM — E11.00 UNCONTROLLED TYPE 2 DM WITH HYPEROSMOLAR NONKETOTIC HYPERGLYCEMIA (H): Status: ACTIVE | Noted: 2018-10-06

## 2024-07-04 PROBLEM — I10 ESSENTIAL HYPERTENSION: Status: ACTIVE | Noted: 2018-10-06

## 2024-07-04 PROBLEM — M62.81 GENERALIZED MUSCLE WEAKNESS: Status: ACTIVE | Noted: 2018-10-06

## 2024-07-04 PROBLEM — L03.90 CELLULITIS: Status: ACTIVE | Noted: 2024-07-04

## 2024-07-04 LAB
ALBUMIN SERPL BCG-MCNC: 3.9 G/DL (ref 3.5–5.2)
ALBUMIN UR-MCNC: 10 MG/DL
ALP SERPL-CCNC: 80 U/L (ref 40–150)
ALT SERPL W P-5'-P-CCNC: 34 U/L (ref 0–70)
ANION GAP SERPL CALCULATED.3IONS-SCNC: 14 MMOL/L (ref 7–15)
ANION GAP SERPL CALCULATED.3IONS-SCNC: 16 MMOL/L (ref 7–15)
APPEARANCE UR: CLEAR
AST SERPL W P-5'-P-CCNC: 26 U/L (ref 0–45)
B-OH-BUTYR SERPL-SCNC: 0.3 MMOL/L
BASE EXCESS BLDV CALC-SCNC: -7.4 MMOL/L (ref -3–3)
BASE EXCESS BLDV CALC-SCNC: -8 MMOL/L (ref -3–3)
BASOPHILS # BLD AUTO: 0 10E3/UL (ref 0–0.2)
BASOPHILS NFR BLD AUTO: 0 %
BILIRUB DIRECT SERPL-MCNC: <0.2 MG/DL (ref 0–0.3)
BILIRUB SERPL-MCNC: 0.6 MG/DL
BILIRUB UR QL STRIP: NEGATIVE
BUN SERPL-MCNC: 37.4 MG/DL (ref 8–23)
BUN SERPL-MCNC: 37.6 MG/DL (ref 8–23)
CALCIUM SERPL-MCNC: 8.7 MG/DL (ref 8.8–10.2)
CALCIUM SERPL-MCNC: 9.1 MG/DL (ref 8.8–10.2)
CHLORIDE SERPL-SCNC: 106 MMOL/L (ref 98–107)
CHLORIDE SERPL-SCNC: 107 MMOL/L (ref 98–107)
COLOR UR AUTO: ABNORMAL
CREAT SERPL-MCNC: 1.79 MG/DL (ref 0.67–1.17)
CREAT SERPL-MCNC: 1.94 MG/DL (ref 0.67–1.17)
DEPRECATED HCO3 PLAS-SCNC: 15 MMOL/L (ref 22–29)
DEPRECATED HCO3 PLAS-SCNC: 19 MMOL/L (ref 22–29)
EGFRCR SERPLBLD CKD-EPI 2021: 39 ML/MIN/1.73M2
EGFRCR SERPLBLD CKD-EPI 2021: 43 ML/MIN/1.73M2
EOSINOPHIL # BLD AUTO: 0 10E3/UL (ref 0–0.7)
EOSINOPHIL NFR BLD AUTO: 0 %
ERYTHROCYTE [DISTWIDTH] IN BLOOD BY AUTOMATED COUNT: 12.8 % (ref 10–15)
FLUAV RNA SPEC QL NAA+PROBE: NEGATIVE
FLUBV RNA RESP QL NAA+PROBE: NEGATIVE
GLUCOSE BLDC GLUCOMTR-MCNC: 143 MG/DL (ref 70–99)
GLUCOSE BLDC GLUCOMTR-MCNC: 185 MG/DL (ref 70–99)
GLUCOSE BLDC GLUCOMTR-MCNC: 203 MG/DL (ref 70–99)
GLUCOSE BLDC GLUCOMTR-MCNC: 391 MG/DL (ref 70–99)
GLUCOSE SERPL-MCNC: 152 MG/DL (ref 70–99)
GLUCOSE SERPL-MCNC: 213 MG/DL (ref 70–99)
GLUCOSE UR STRIP-MCNC: NEGATIVE MG/DL
HCO3 BLDV-SCNC: 17 MMOL/L (ref 21–28)
HCO3 BLDV-SCNC: 21 MMOL/L (ref 21–28)
HCT VFR BLD AUTO: 46.1 % (ref 40–53)
HGB BLD-MCNC: 14.8 G/DL (ref 13.3–17.7)
HGB UR QL STRIP: ABNORMAL
HOLD SPECIMEN: NORMAL
HOLD SPECIMEN: NORMAL
HYALINE CASTS: 19 /LPF
IMM GRANULOCYTES # BLD: 0 10E3/UL
IMM GRANULOCYTES NFR BLD: 0 %
KETONES UR STRIP-MCNC: NEGATIVE MG/DL
LACTATE SERPL-SCNC: 1.3 MMOL/L (ref 0.7–2)
LACTATE SERPL-SCNC: 2.2 MMOL/L (ref 0.7–2)
LACTATE SERPL-SCNC: 3.1 MMOL/L (ref 0.7–2)
LEUKOCYTE ESTERASE UR QL STRIP: NEGATIVE
LIPASE SERPL-CCNC: 11 U/L (ref 13–60)
LYMPHOCYTES # BLD AUTO: 0.3 10E3/UL (ref 0.8–5.3)
LYMPHOCYTES NFR BLD AUTO: 4 %
MAGNESIUM SERPL-MCNC: 1.9 MG/DL (ref 1.7–2.3)
MCH RBC QN AUTO: 30.8 PG (ref 26.5–33)
MCHC RBC AUTO-ENTMCNC: 32.1 G/DL (ref 31.5–36.5)
MCV RBC AUTO: 96 FL (ref 78–100)
MONOCYTES # BLD AUTO: 0.3 10E3/UL (ref 0–1.3)
MONOCYTES NFR BLD AUTO: 4 %
MUCOUS THREADS #/AREA URNS LPF: PRESENT /LPF
NEUTROPHILS # BLD AUTO: 6.5 10E3/UL (ref 1.6–8.3)
NEUTROPHILS NFR BLD AUTO: 91 %
NITRATE UR QL: NEGATIVE
NRBC # BLD AUTO: 0 10E3/UL
NRBC BLD AUTO-RTO: 0 /100
O2/TOTAL GAS SETTING VFR VENT: 36 %
O2/TOTAL GAS SETTING VFR VENT: 40 %
OXYHGB MFR BLDV: 31 % (ref 70–75)
OXYHGB MFR BLDV: 88 % (ref 70–75)
PCO2 BLDV: 33 MM HG (ref 40–50)
PCO2 BLDV: 53 MM HG (ref 40–50)
PH BLDV: 7.21 [PH] (ref 7.32–7.43)
PH BLDV: 7.32 [PH] (ref 7.32–7.43)
PH UR STRIP: 5 [PH] (ref 4.7–8)
PLATELET # BLD AUTO: 219 10E3/UL (ref 150–450)
PO2 BLDV: 20 MM HG (ref 25–47)
PO2 BLDV: 55 MM HG (ref 25–47)
POTASSIUM SERPL-SCNC: 5.5 MMOL/L (ref 3.4–5.3)
POTASSIUM SERPL-SCNC: 5.6 MMOL/L (ref 3.4–5.3)
POTASSIUM SERPL-SCNC: 6 MMOL/L (ref 3.4–5.3)
PROCALCITONIN SERPL IA-MCNC: 0.08 NG/ML
PROT SERPL-MCNC: 6.8 G/DL (ref 6.4–8.3)
RBC # BLD AUTO: 4.81 10E6/UL (ref 4.4–5.9)
RBC URINE: 1 /HPF
RSV RNA SPEC NAA+PROBE: NEGATIVE
SAO2 % BLDV: 31.3 % (ref 70–75)
SAO2 % BLDV: 91.2 % (ref 70–75)
SARS-COV-2 RNA RESP QL NAA+PROBE: NEGATIVE
SODIUM SERPL-SCNC: 137 MMOL/L (ref 135–145)
SODIUM SERPL-SCNC: 140 MMOL/L (ref 135–145)
SP GR UR STRIP: 1.03 (ref 1–1.03)
SQUAMOUS EPITHELIAL: 0 /HPF
TROPONIN T SERPL HS-MCNC: 32 NG/L
TROPONIN T SERPL HS-MCNC: 36 NG/L
TSH SERPL DL<=0.005 MIU/L-ACNC: 1.89 UIU/ML (ref 0.3–4.2)
UROBILINOGEN UR STRIP-MCNC: NORMAL MG/DL
WBC # BLD AUTO: 7.2 10E3/UL (ref 4–11)
WBC URINE: 1 /HPF

## 2024-07-04 PROCEDURE — 84443 ASSAY THYROID STIM HORMONE: CPT | Performed by: NURSE PRACTITIONER

## 2024-07-04 PROCEDURE — 96361 HYDRATE IV INFUSION ADD-ON: CPT

## 2024-07-04 PROCEDURE — 36415 COLL VENOUS BLD VENIPUNCTURE: CPT | Performed by: INTERNAL MEDICINE

## 2024-07-04 PROCEDURE — 87040 BLOOD CULTURE FOR BACTERIA: CPT | Performed by: NURSE PRACTITIONER

## 2024-07-04 PROCEDURE — 250N000011 HC RX IP 250 OP 636: Performed by: NURSE PRACTITIONER

## 2024-07-04 PROCEDURE — 83605 ASSAY OF LACTIC ACID: CPT | Performed by: INTERNAL MEDICINE

## 2024-07-04 PROCEDURE — 87637 SARSCOV2&INF A&B&RSV AMP PRB: CPT | Performed by: NURSE PRACTITIONER

## 2024-07-04 PROCEDURE — 258N000003 HC RX IP 258 OP 636: Performed by: NURSE PRACTITIONER

## 2024-07-04 PROCEDURE — 80076 HEPATIC FUNCTION PANEL: CPT | Performed by: NURSE PRACTITIONER

## 2024-07-04 PROCEDURE — 84132 ASSAY OF SERUM POTASSIUM: CPT | Performed by: NURSE PRACTITIONER

## 2024-07-04 PROCEDURE — 93308 TTE F-UP OR LMTD: CPT | Mod: 26 | Performed by: NURSE PRACTITIONER

## 2024-07-04 PROCEDURE — 84132 ASSAY OF SERUM POTASSIUM: CPT | Performed by: INTERNAL MEDICINE

## 2024-07-04 PROCEDURE — 81001 URINALYSIS AUTO W/SCOPE: CPT | Performed by: NURSE PRACTITIONER

## 2024-07-04 PROCEDURE — 250N000012 HC RX MED GY IP 250 OP 636 PS 637: Performed by: NURSE PRACTITIONER

## 2024-07-04 PROCEDURE — 258N000003 HC RX IP 258 OP 636: Performed by: INTERNAL MEDICINE

## 2024-07-04 PROCEDURE — 83735 ASSAY OF MAGNESIUM: CPT | Performed by: NURSE PRACTITIONER

## 2024-07-04 PROCEDURE — 36415 COLL VENOUS BLD VENIPUNCTURE: CPT | Performed by: NURSE PRACTITIONER

## 2024-07-04 PROCEDURE — 120N000001 HC R&B MED SURG/OB

## 2024-07-04 PROCEDURE — 82805 BLOOD GASES W/O2 SATURATION: CPT | Performed by: NURSE PRACTITIONER

## 2024-07-04 PROCEDURE — 84484 ASSAY OF TROPONIN QUANT: CPT | Performed by: NURSE PRACTITIONER

## 2024-07-04 PROCEDURE — 93010 ELECTROCARDIOGRAM REPORT: CPT | Performed by: INTERNAL MEDICINE

## 2024-07-04 PROCEDURE — 83605 ASSAY OF LACTIC ACID: CPT | Performed by: NURSE PRACTITIONER

## 2024-07-04 PROCEDURE — 84145 PROCALCITONIN (PCT): CPT | Performed by: NURSE PRACTITIONER

## 2024-07-04 PROCEDURE — 82010 KETONE BODYS QUAN: CPT | Performed by: NURSE PRACTITIONER

## 2024-07-04 PROCEDURE — 82962 GLUCOSE BLOOD TEST: CPT

## 2024-07-04 PROCEDURE — 99285 EMERGENCY DEPT VISIT HI MDM: CPT | Mod: 25 | Performed by: NURSE PRACTITIONER

## 2024-07-04 PROCEDURE — 99223 1ST HOSP IP/OBS HIGH 75: CPT | Mod: AI | Performed by: INTERNAL MEDICINE

## 2024-07-04 PROCEDURE — 96375 TX/PRO/DX INJ NEW DRUG ADDON: CPT

## 2024-07-04 PROCEDURE — 250N000013 HC RX MED GY IP 250 OP 250 PS 637: Performed by: INTERNAL MEDICINE

## 2024-07-04 PROCEDURE — 70450 CT HEAD/BRAIN W/O DYE: CPT | Mod: MF

## 2024-07-04 PROCEDURE — 83690 ASSAY OF LIPASE: CPT | Performed by: NURSE PRACTITIONER

## 2024-07-04 PROCEDURE — 85025 COMPLETE CBC W/AUTO DIFF WBC: CPT | Performed by: NURSE PRACTITIONER

## 2024-07-04 PROCEDURE — 93308 TTE F-UP OR LMTD: CPT | Mod: TC

## 2024-07-04 PROCEDURE — 93005 ELECTROCARDIOGRAM TRACING: CPT

## 2024-07-04 PROCEDURE — 87641 MR-STAPH DNA AMP PROBE: CPT | Performed by: INTERNAL MEDICINE

## 2024-07-04 PROCEDURE — 250N000011 HC RX IP 250 OP 636: Performed by: INTERNAL MEDICINE

## 2024-07-04 PROCEDURE — 87640 STAPH A DNA AMP PROBE: CPT | Performed by: INTERNAL MEDICINE

## 2024-07-04 PROCEDURE — 99285 EMERGENCY DEPT VISIT HI MDM: CPT | Mod: 25

## 2024-07-04 PROCEDURE — 71275 CT ANGIOGRAPHY CHEST: CPT | Mod: MG

## 2024-07-04 PROCEDURE — 250N000012 HC RX MED GY IP 250 OP 636 PS 637: Performed by: INTERNAL MEDICINE

## 2024-07-04 PROCEDURE — 999N000157 HC STATISTIC RCP TIME EA 10 MIN

## 2024-07-04 PROCEDURE — 96365 THER/PROPH/DIAG IV INF INIT: CPT

## 2024-07-04 RX ORDER — DORZOLAMIDE HCL 20 MG/ML
1 SOLUTION/ DROPS OPHTHALMIC 2 TIMES DAILY
Status: DISCONTINUED | OUTPATIENT
Start: 2024-07-04 | End: 2024-07-09 | Stop reason: HOSPADM

## 2024-07-04 RX ORDER — NICOTINE POLACRILEX 4 MG
15-30 LOZENGE BUCCAL
Status: DISCONTINUED | OUTPATIENT
Start: 2024-07-04 | End: 2024-07-09 | Stop reason: HOSPADM

## 2024-07-04 RX ORDER — AMOXICILLIN 250 MG
1 CAPSULE ORAL 2 TIMES DAILY PRN
Status: DISCONTINUED | OUTPATIENT
Start: 2024-07-04 | End: 2024-07-09 | Stop reason: HOSPADM

## 2024-07-04 RX ORDER — SODIUM CHLORIDE 9 MG/ML
INJECTION, SOLUTION INTRAVENOUS CONTINUOUS
Status: DISCONTINUED | OUTPATIENT
Start: 2024-07-04 | End: 2024-07-06

## 2024-07-04 RX ORDER — DEXTROSE MONOHYDRATE 25 G/50ML
25-50 INJECTION, SOLUTION INTRAVENOUS
Status: DISCONTINUED | OUTPATIENT
Start: 2024-07-04 | End: 2024-07-09 | Stop reason: HOSPADM

## 2024-07-04 RX ORDER — INSULIN LISPRO 100 [IU]/ML
5 INJECTION, SOLUTION INTRAVENOUS; SUBCUTANEOUS ONCE
Status: DISCONTINUED | OUTPATIENT
Start: 2024-07-04 | End: 2024-07-04

## 2024-07-04 RX ORDER — AMOXICILLIN 250 MG
2 CAPSULE ORAL 2 TIMES DAILY PRN
Status: DISCONTINUED | OUTPATIENT
Start: 2024-07-04 | End: 2024-07-09 | Stop reason: HOSPADM

## 2024-07-04 RX ORDER — IOPAMIDOL 755 MG/ML
76 INJECTION, SOLUTION INTRAVASCULAR ONCE
Status: COMPLETED | OUTPATIENT
Start: 2024-07-04 | End: 2024-07-04

## 2024-07-04 RX ORDER — CEFEPIME HYDROCHLORIDE 2 G/1
2 INJECTION, POWDER, FOR SOLUTION INTRAVENOUS EVERY 12 HOURS
Status: DISCONTINUED | OUTPATIENT
Start: 2024-07-04 | End: 2024-07-06

## 2024-07-04 RX ORDER — DORZOLAMIDE HCL 20 MG/ML
1 SOLUTION/ DROPS OPHTHALMIC 3 TIMES DAILY
Status: DISCONTINUED | OUTPATIENT
Start: 2024-07-04 | End: 2024-07-04

## 2024-07-04 RX ORDER — LIDOCAINE 40 MG/G
CREAM TOPICAL
Status: DISCONTINUED | OUTPATIENT
Start: 2024-07-04 | End: 2024-07-09 | Stop reason: HOSPADM

## 2024-07-04 RX ORDER — AZITHROMYCIN 500 MG/5ML
500 INJECTION, POWDER, LYOPHILIZED, FOR SOLUTION INTRAVENOUS EVERY 24 HOURS
Status: DISCONTINUED | OUTPATIENT
Start: 2024-07-04 | End: 2024-07-04

## 2024-07-04 RX ORDER — ROSUVASTATIN CALCIUM 10 MG/1
10 TABLET, COATED ORAL EVERY EVENING
Status: DISCONTINUED | OUTPATIENT
Start: 2024-07-04 | End: 2024-07-09 | Stop reason: HOSPADM

## 2024-07-04 RX ORDER — CEFTRIAXONE SODIUM 2 G/50ML
2 INJECTION, SOLUTION INTRAVENOUS ONCE
Status: COMPLETED | OUTPATIENT
Start: 2024-07-04 | End: 2024-07-04

## 2024-07-04 RX ORDER — METOPROLOL TARTRATE 25 MG/1
50 TABLET, FILM COATED ORAL 2 TIMES DAILY
Status: DISCONTINUED | OUTPATIENT
Start: 2024-07-04 | End: 2024-07-09 | Stop reason: HOSPADM

## 2024-07-04 RX ORDER — ENOXAPARIN SODIUM 100 MG/ML
40 INJECTION SUBCUTANEOUS AT BEDTIME
Status: DISCONTINUED | OUTPATIENT
Start: 2024-07-04 | End: 2024-07-07

## 2024-07-04 RX ADMIN — AZITHROMYCIN MONOHYDRATE 500 MG: 500 INJECTION, POWDER, LYOPHILIZED, FOR SOLUTION INTRAVENOUS at 13:40

## 2024-07-04 RX ADMIN — VANCOMYCIN HYDROCHLORIDE 2500 MG: 5 INJECTION, POWDER, LYOPHILIZED, FOR SOLUTION INTRAVENOUS at 16:54

## 2024-07-04 RX ADMIN — ENOXAPARIN SODIUM 40 MG: 40 INJECTION SUBCUTANEOUS at 21:30

## 2024-07-04 RX ADMIN — INSULIN HUMAN 5 UNITS: 100 INJECTION, SOLUTION PARENTERAL at 12:53

## 2024-07-04 RX ADMIN — CEFTRIAXONE SODIUM 2 G: 2 INJECTION, SOLUTION INTRAVENOUS at 13:08

## 2024-07-04 RX ADMIN — INSULIN ASPART 1 UNITS: 100 INJECTION, SOLUTION INTRAVENOUS; SUBCUTANEOUS at 17:21

## 2024-07-04 RX ADMIN — PROCHLORPERAZINE EDISYLATE 10 MG: 5 INJECTION INTRAMUSCULAR; INTRAVENOUS at 11:35

## 2024-07-04 RX ADMIN — CEFEPIME 2 G: 2 INJECTION, POWDER, FOR SOLUTION INTRAVENOUS at 20:17

## 2024-07-04 RX ADMIN — DORZOLAMIDE 1 DROP: 20 SOLUTION/ DROPS OPHTHALMIC at 21:34

## 2024-07-04 RX ADMIN — LEVOTHYROXINE SODIUM 137 MCG: 0.03 TABLET ORAL at 21:30

## 2024-07-04 RX ADMIN — ROSUVASTATIN 10 MG: 10 TABLET, FILM COATED ORAL at 21:30

## 2024-07-04 RX ADMIN — SODIUM CHLORIDE, POTASSIUM CHLORIDE, SODIUM LACTATE AND CALCIUM CHLORIDE 1000 ML: 600; 310; 30; 20 INJECTION, SOLUTION INTRAVENOUS at 11:52

## 2024-07-04 RX ADMIN — METOPROLOL TARTRATE 50 MG: 25 TABLET, FILM COATED ORAL at 21:30

## 2024-07-04 RX ADMIN — IOPAMIDOL 76 ML: 755 INJECTION, SOLUTION INTRAVENOUS at 12:40

## 2024-07-04 RX ADMIN — SODIUM CHLORIDE: 9 INJECTION, SOLUTION INTRAVENOUS at 16:43

## 2024-07-04 ASSESSMENT — ACTIVITIES OF DAILY LIVING (ADL)
ADLS_ACUITY_SCORE: 29
CHANGE_IN_FUNCTIONAL_STATUS_SINCE_ONSET_OF_CURRENT_ILLNESS/INJURY: YES
WEAR_GLASSES_OR_BLIND: YES
ADLS_ACUITY_SCORE: 29
ADLS_ACUITY_SCORE: 29
WALKING_OR_CLIMBING_STAIRS_DIFFICULTY: NO
TOILETING_ISSUES: NO
ADLS_ACUITY_SCORE: 27
ADLS_ACUITY_SCORE: 42
FALL_HISTORY_WITHIN_LAST_SIX_MONTHS: NO
ADLS_ACUITY_SCORE: 42
HEARING_DIFFICULTY_OR_DEAF: NO
DIFFICULTY_EATING/SWALLOWING: NO
ADLS_ACUITY_SCORE: 27
ADLS_ACUITY_SCORE: 42
CONCENTRATING,_REMEMBERING_OR_MAKING_DECISIONS_DIFFICULTY: NO
ADLS_ACUITY_SCORE: 42
DIFFICULTY_COMMUNICATING: NO
DRESSING/BATHING_DIFFICULTY: NO
ADLS_ACUITY_SCORE: 27
DOING_ERRANDS_INDEPENDENTLY_DIFFICULTY: NO
ADLS_ACUITY_SCORE: 42
ADLS_ACUITY_SCORE: 27
VISION_MANAGEMENT: WEAR GLASSES

## 2024-07-04 ASSESSMENT — ENCOUNTER SYMPTOMS
NUMBNESS: 0
DIZZINESS: 0
ABDOMINAL PAIN: 0
HEADACHES: 1
HEMATOLOGIC/LYMPHATIC NEGATIVE: 1
ALLERGIC/IMMUNOLOGIC NEGATIVE: 1
MUSCULOSKELETAL NEGATIVE: 1
SHORTNESS OF BREATH: 1
CHILLS: 1
EYES NEGATIVE: 1
SEIZURES: 0
CONSTIPATION: 0
ENDOCRINE NEGATIVE: 1
WEAKNESS: 0
LIGHT-HEADEDNESS: 0
VOMITING: 1
BLOOD IN STOOL: 0
COUGH: 1
NAUSEA: 1
PSYCHIATRIC NEGATIVE: 1
DIARRHEA: 0

## 2024-07-04 ASSESSMENT — COLUMBIA-SUICIDE SEVERITY RATING SCALE - C-SSRS
6. HAVE YOU EVER DONE ANYTHING, STARTED TO DO ANYTHING, OR PREPARED TO DO ANYTHING TO END YOUR LIFE?: NO
2. HAVE YOU ACTUALLY HAD ANY THOUGHTS OF KILLING YOURSELF IN THE PAST MONTH?: NO
1. IN THE PAST MONTH, HAVE YOU WISHED YOU WERE DEAD OR WISHED YOU COULD GO TO SLEEP AND NOT WAKE UP?: NO

## 2024-07-04 NOTE — ED TRIAGE NOTES
Patient woke up with a cough. Head, neck and shoulders painful. , sweaty, tremors. Became worse

## 2024-07-04 NOTE — ED NOTES
Patient in room 11, settled on cot with call light with in reach.    States this morning woke up feeling miserable.  States last night and yesterday he felt fine. Woke up with body aches, shortness of breath, nausea and vomiting and diarrhea. Admits to some pain in neck , shoulders and left chest.

## 2024-07-04 NOTE — ED PROVIDER NOTES
History     Chief Complaint   Patient presents with    Shortness of Breath    Hypoglycemia     HPI  Homer Triana is a 63 year old individual with history of DM, 6 diabetic neuropathy, hypertension, hypothyroidism, comes in via EMS for complaints of shortness of breath, chills, shortness of breath.  Patient states that he went to bed last night feeling fine.  Woke up today and had chills, shortness of breath.  States that he does have left-sided chest pain.  Also is having nausea and vomiting.  States that his blood glucose was 50.  Patient states that he called EMS due to this.  Patient was hypoxic on their arrival so patient is on O2 via NC at 4 L upon arrival.  Patient continued to have nausea and vomiting so ondansetron 4 mg IV given prior to arrival by EMS.  Patient does admit to a slight headache and cough at arrival.  No abdominal pain reported.    Allergies:  No Known Allergies    Problem List:    Patient Active Problem List    Diagnosis Date Noted    Acute respiratory failure with hypoxia (H) 07/04/2024     Priority: Medium    Community acquired pneumonia of right lung, unspecified part of lung 07/04/2024     Priority: Medium    Hypoglycemia due to type 1 diabetes mellitus (H) 09/18/2021     Priority: Medium    Acidosis 09/16/2021     Priority: Medium    Hypothermia, initial encounter 09/16/2021     Priority: Medium    Acute metabolic encephalopathy due to hypoglycemia 09/16/2021     Priority: Medium    Uncontrolled type 1 diabetes mellitus with hyperglycemia (H) 01/18/2019     Priority: Medium    DKA (diabetic ketoacidosis) (H) 11/22/2018     Priority: Medium    Uncontrolled type 2 DM with hyperosmolar nonketotic hyperglycemia (H) 10/06/2018     Priority: Medium    Syncope, unspecified syncope type 10/06/2018     Priority: Medium    Diabetic neuropathy (H) 10/06/2018     Priority: Medium    Back pain 10/06/2018     Priority: Medium    Generalized muscle weakness 10/06/2018     Priority: Medium     Essential hypertension 10/06/2018     Priority: Medium    Acute kidney injury (H24) 10/06/2018     Priority: Medium    Uncontrolled type 2 diabetes mellitus with hyperosmolar nonketotic hyperglycemia (H) 10/06/2018     Priority: Medium        Past Medical History:    Past Medical History:   Diagnosis Date    Diabetes (H)     Diabetic peripheral neuropathy (H)     Hyperlipidemia     Hypertension     Hypothyroid        Past Surgical History:    Past Surgical History:   Procedure Laterality Date    CHOLECYSTECTOMY      COLONOSCOPY - HIM SCAN  05/12/2015    Colonoscopy, Dr Huber 5/12/2015       Family History:    No family history on file.    Social History:  Marital Status:  Single [1]  Social History     Tobacco Use    Smoking status: Former    Smokeless tobacco: Never   Substance Use Topics    Alcohol use: Yes     Comment: moderately    Drug use: No        Medications:    BAQSIMI TWO PACK 3 MG/DOSE POWD  blood glucose (ACCU-CHEK GUIDE) test strip  Blood Glucose Monitoring Suppl (ACCU-CHEK GUIDE) w/Device KIT  COMBIGAN 0.2-0.5 % ophthalmic solution  dorzolamide (TRUSOPT) 2 % ophthalmic solution  hydrochlorothiazide (HYDRODIURIL) 25 MG tablet  insulin glargine (LANTUS PEN) 100 UNIT/ML pen  INSULIN PUMP - OUTPATIENT  levothyroxine (SYNTHROID/LEVOTHROID) 137 MCG tablet  lisinopril (PRINIVIL/ZESTRIL) 40 MG tablet  metoprolol tartrate (LOPRESSOR) 50 MG tablet  MULTI MINERALS-AMINO ACIDS PO  Multiple Vitamins-Minerals (MENS MULTIVITAMIN PO)  NOVOLOG VIAL 100 UNIT/ML soln  Nutritional Supplements (GLUCOSE MANAGEMENT) TABS  rosuvastatin (CRESTOR) 10 MG tablet  Vitamin D3 (CHOLECALCIFEROL) 125 MCG (5000 UT) tablet          Review of Systems   Constitutional:  Positive for chills.   HENT: Negative.     Eyes: Negative.    Respiratory:  Positive for cough and shortness of breath.    Cardiovascular:  Positive for chest pain.   Gastrointestinal:  Positive for nausea and vomiting. Negative for abdominal pain, blood in stool,  "constipation and diarrhea.   Endocrine: Negative.    Genitourinary: Negative.    Musculoskeletal: Negative.    Skin: Negative.    Allergic/Immunologic: Negative.    Neurological:  Positive for headaches. Negative for dizziness, seizures, syncope, weakness, light-headedness and numbness.   Hematological: Negative.    Psychiatric/Behavioral: Negative.         Physical Exam   BP: 126/60  Pulse: 93  Temp: (!) 101.5  F (38.6  C)  Resp: (!) 28  Height: 177.8 cm (5' 10\")  Weight: 124.8 kg (275 lb 2.2 oz)  SpO2: (!) 90 %      GENERAL APPEARANCE:  The patient is a 63 year old well-developed, well-nourished individual in no acute distress that appears as stated age.  Patient is having nausea and vomiting on arrival.  Patient does have rigors on arrival.  NECK:  Supple.  Trachea is midline.    CHEST:  Symmetric.  Non-tender to palpation.  No crepitus or deformity.  LUNGS:  Breathing is easy.  Breath sounds are dim in bilateral bases.  HEART:  Regular rate and rhythm with normal S1 and S2.  No murmurs, gallops, or rubs.  ABDOMEN:  Soft.  No mass, tenderness, guarding, or rebound.  No organomegaly or hernia.  Bowel sounds are present.  No CVA tenderness or flank mass.  No abdominal bruits or thrills present upon auscultation/palpation.  EXTREMITIES:  No cyanosis, clubbing, or edema.    NEUROLOGIC:  No focal sensory or motor deficits are noted. PSYCHIATRIC:  The patient is awake, alert, and oriented x4.  Recent and remote memory is intact.  Appropriate mood and affect.  Calm and cooperative with history and physical exam.  SKIN:  Warm, dry, and well perfused.  Good turgor.  Venous stasis to bilateral lower extremities.  Does have scabbed over lesion to right anterior shin with no surrounding erythema or toxic striations.  Scabbed over wound to plantar surface of left foot that is black in color.  No surrounding erythema or toxic striations or drainage noted.      Comment: Discrepancies between my note and notes on behalf of the " nursing team or other care providers are secondary to my findings reflecting my physical examination and questioning of the patient.  Any conflicting information provided is not in line with my examination of the patient.       ED Course     ED Course as of 07/04/24 1433   u Jul 04, 2024   1122 In to see patient and history/physical completed.    1129 Labs, ECG, CT angio of the chest ordered.  Prochlorperazine 10 mg IV ordered.   1133 EKG 12-lead, tracing only  No STEMI noted.   1141 Patient with elevated temperature of 38.6  C with heart rate of 101.  Patient meets SIRS criteria.  Sepsis workup initiated including 1 L LR bolus.   1221 Basic metabolic panel(!)  Potassium 6.0.  BUN 37.4 with creatinine 1.94 and a GFR of 39 which is worse than patient baseline.  Patient is getting 1 L LR to help with this.   1222 Troponin T, High Sensitivity(!): 32  High-sensitivity troponin is elevated at 32.  2-hour repeat ordered for 1345.   1223 POCUS echo shows poor imaging but no obvious abnormality noted.   1225 Glucose(!): 213  Glucose 213.  Patient's insulin pump is alarming that there is no insulin available.  With the elevated potassium did order regular insulin 5 units IV x 1 dose.   1301 CT of head negative for acute abnormality   1302 Ceftriaxone 2 g IV in addition to azithromycin 500 mg IV ordered.   1408 Potassium(!): 5.5  Testing has improved to 5.5 and pH is now 7.32 with a CO2 of 33 and a pO2 of 55.  Glucose down to 203.   1413 Troponin T, High Sensitivity(!): 36  High-sensitivity troponin did elevate from 32 up to 36.   1428 Discussed case with hospitalist Dr. Howard.  Patient accepted for [facility] admission.     POC US ECHO LIMITED    Date/Time: 7/4/2024 12:23 PM    Performed by: Jacob Nguyen APRN CNP  Authorized by: Jacob Nguyen APRN CNP    Comments:      Limited Bedside Cardiac Ultrasound, performed and interpreted by me.   Indication: Chest Pain and Shortness of Breath.  Parasternal long axis,  parasternal short axis, apical 4 chamber, and lung views were acquired.   Image quality was satisfactory.    Findings:    Global left ventricular function appears intact.  There is no evidence of free fluid within the pericardium.    IMPRESSION: Grossly normal left ventricular function and chamber size.  No pericardial effusion.       ECG:    ECG competed at 1133 and personally reviewed at 1133 showing sinus tachycardia with ventricular rate of 100 and QTc of 443.  Normal axis.  Normal ECG.  When compared to ECG from 9/16/2021, no significant changes noted.       If one the following conditions is present, a 30 mL/kg bolus is recommended as part of the 6 hour bundle (IBW can be used for BMI >30, or document refusal/contraindication):      1.   Initial hypotension  defined as 2 bps < 90 or map < 65 in the 6hrs before or 3hrs after time zero.     2.  Lactate >4.      The patient has signs of Severe Sepsis as evidenced by:    1. 2 SIRS criteria, AND  2. Suspected infection, AND   3. Organ dysfunction: Lactic Acidosis with value >2.0    Time severe sepsis diagnosis confirmed: 1220  07/04/24 as this was the time when Lactate resulted, and the level was > 2.0    3 Hour Severe Sepsis Bundle Completion:    1. Initial Lactic Acid Result:   Recent Labs   Lab Test 07/04/24  1139 09/16/21  1020 10/06/18  1710   LACT 3.1* 1.6 2.3*     2. Blood Cultures before Antibiotics: Yes  3. Broad Spectrum Antibiotics Administered:  no, antibiotics for pneumonia ordered.       Anti-infectives (From admission through now)      Start     Dose/Rate Route Frequency Ordered Stop    07/04/24 1330  cefTRIAXone IN D5W (ROCEPHIN) intermittent infusion 2 g         2 g  100 mL/hr over 30 Minutes Intravenous ONCE 07/04/24 1303 07/04/24 1340    07/04/24 1305  azithromycin (ZITHROMAX) 500 mg in  mL intermittent infusion         500 mg  over 1 Hours Intravenous EVERY 24 HOURS 07/04/24 1303              4. Is initial hypotension present?     No (IV  fluid bolus NOT required). IV Fluid volume administered: 1000 ml         Results for orders placed or performed during the hospital encounter of 07/04/24 (from the past 24 hour(s))   EKG 12-lead, tracing only   Result Value Ref Range    Systolic Blood Pressure  mmHg    Diastolic Blood Pressure  mmHg    Ventricular Rate 100 BPM    Atrial Rate 100 BPM    TX Interval 154 ms    QRS Duration 92 ms     ms    QTc 443 ms    P Axis 38 degrees    R AXIS -26 degrees    T Axis 65 degrees    Interpretation ECG       Sinus rhythm  Normal ECG  No previous ECGs available     Symptomatic Influenza A/B, RSV, & SARS-CoV2 PCR (COVID-19) Nose    Specimen: Nose; Swab   Result Value Ref Range    Influenza A PCR Negative Negative    Influenza B PCR Negative Negative    RSV PCR Negative Negative    SARS CoV2 PCR Negative Negative    Narrative    Testing was performed using the Xpert Xpress CoV2/Flu/RSV Assay on the [employer] Instrument. This test should be ordered for the detection of SARS-CoV-2, influenza, and RSV viruses in individuals who meet clinical and/or epidemiological criteria. Test performance is unknown in asymptomatic patients. This test is for in vitro diagnostic use under the FDA EUA for laboratories certified under CLIA to perform high or moderate complexity testing. This test has not been FDA cleared or approved. A negative result does not rule out the presence of PCR inhibitors in the specimen or target RNA in concentration below the limit of detection for the assay. If only one viral target is positive but coinfection with multiple targets is suspected, the sample should be re-tested with another FDA cleared, approved, or authorized test, if coinfection would change clinical management. This test was validated by the [facility]. These laboratories are certified under the Clinical Laboratory Improvement Amendments of 1988 (CLIA-88) as qualified to perform high complexity laboratory  testing.   CBC with platelets differential    Narrative    The following orders were created for panel order CBC with platelets differential.  Procedure                               Abnormality         Status                     ---------                               -----------         ------                     CBC with platelets and d...[[id number]]  Abnormal            Final result                 Please view results for these tests on the individual orders.   Basic metabolic panel   Result Value Ref Range    Sodium 140 135 - 145 mmol/L    Potassium 6.0 (H) 3.4 - 5.3 mmol/L    Chloride 107 98 - 107 mmol/L    Carbon Dioxide (CO2) 19 (L) 22 - 29 mmol/L    Anion Gap 14 7 - 15 mmol/L    Urea Nitrogen 37.4 (H) 8.0 - 23.0 mg/dL    Creatinine 1.94 (H) 0.67 - 1.17 mg/dL    GFR Estimate 39 (L) >60 mL/min/1.73m2    Calcium 9.1 8.8 - 10.2 mg/dL    Glucose 213 (H) 70 - 99 mg/dL   Hepatic function panel   Result Value Ref Range    Protein Total 6.8 6.4 - 8.3 g/dL    Albumin 3.9 3.5 - 5.2 g/dL    Bilirubin Total 0.6 <=1.2 mg/dL    Alkaline Phosphatase 80 40 - 150 U/L    AST 26 0 - 45 U/L    ALT 34 0 - 70 U/L    Bilirubin Direct <0.20 0.00 - 0.30 mg/dL   Lipase   Result Value Ref Range    Lipase 11 (L) 13 - 60 U/L   Troponin T, High Sensitivity   Result Value Ref Range    Troponin T, High Sensitivity 32 (H) <=22 ng/L   Magnesium   Result Value Ref Range    Magnesium 1.9 1.7 - 2.3 mg/dL   Blood gas venous   Result Value Ref Range    pH Venous 7.21 (L) 7.32 - 7.43    pCO2 Venous 53 (H) 40 - 50 mm Hg    pO2 Venous 20 (L) 25 - 47 mm Hg    Bicarbonate Venous 21 21 - 28 mmol/L    Base Excess/Deficit Venous -7.4 (L) -3.0 - 3.0 mmol/L    FIO2 36     Oxyhemoglobin Venous 31 (L) 70 - 75 %    O2 Sat, Venous 31.3 (L) 70.0 - 75.0 %    Narrative    In healthy individuals, oxyhemoglobin (O2Hb) and oxygen saturation (SO2) are approximately equal. In the presence of dyshemoglobins, oxyhemoglobin can be considerably lower than oxygen  saturation.   Lactic acid whole blood   Result Value Ref Range    Lactic Acid 3.1 (H) 0.7 - 2.0 mmol/L   Procalcitonin   Result Value Ref Range    Procalcitonin 0.08 <0.50 ng/mL   CBC with platelets and differential   Result Value Ref Range    WBC Count 7.2 4.0 - 11.0 10e3/uL    RBC Count 4.81 4.40 - 5.90 10e6/uL    Hemoglobin 14.8 13.3 - 17.7 g/dL    Hematocrit 46.1 40.0 - 53.0 %    MCV 96 78 - 100 fL    MCH 30.8 26.5 - 33.0 pg    MCHC 32.1 31.5 - 36.5 g/dL    RDW 12.8 10.0 - 15.0 %    Platelet Count 219 150 - 450 10e3/uL    % Neutrophils 91 %    % Lymphocytes 4 %    % Monocytes 4 %    % Eosinophils 0 %    % Basophils 0 %    % Immature Granulocytes 0 %    NRBCs per 100 WBC 0 <1 /100    Absolute Neutrophils 6.5 1.6 - 8.3 10e3/uL    Absolute Lymphocytes 0.3 (L) 0.8 - 5.3 10e3/uL    Absolute Monocytes 0.3 0.0 - 1.3 10e3/uL    Absolute Eosinophils 0.0 0.0 - 0.7 10e3/uL    Absolute Basophils 0.0 0.0 - 0.2 10e3/uL    Absolute Immature Granulocytes 0.0 <=0.4 10e3/uL    Absolute NRBCs 0.0 10e3/uL   Extra Tube    Narrative    The following orders were created for panel order Extra Tube.  Procedure                               Abnormality         Status                     ---------                               -----------         ------                     Extra Blue Top Tube[[id number]]                              Final result               Extra Red Top Tube[[id number]]                               Final result                 Please view results for these tests on the individual orders.   Extra Blue Top Tube   Result Value Ref Range    Hold Specimen JIC    Extra Red Top Tube   Result Value Ref Range    Hold Specimen JIC    TSH with free T4 reflex   Result Value Ref Range    TSH 1.89 0.30 - 4.20 uIU/mL   Ketone Beta-Hydroxybutyrate Quantitative   Result Value Ref Range    Ketone (Beta-Hydroxybutyrate) Quantitative 0.30 <=0.30 mmol/L   Head CT w/o contrast    Narrative    EXAM: CT HEAD W/O CONTRAST, 7/4/2024 12:45  PM    HISTORY: New onset headache with nausea    COMPARISON: CT head 9/16/2021    TECHNIQUE:   Imaging protocol: Multiplanar CT images of the head without  intravenous contrast.   Acquisition: This CT exam was performed using one or more the  following dose reduction techniques: automated exposure control,  adjustment of the mA and/or kV according to patient size, and/or  iterative reconstruction technique.    FINDINGS:  No intracranial hemorrhage, mass effect, or midline shift. The  ventricles are proportionate to the cerebral sulci. Patchy foci of  hypodensity in the periventricular and subcortical white matter, which  is nonspecific, likely related to chronic small vessel ischemic  disease given the patient's age. No acute loss of gray-white matter  differentiation. Atherosclerotic arterial calcifications.    No acute osseous abnormality. No paranasal sinus mucosal thickening.  Mastoid air cells are clear. Bilateral pseudophakia.       Impression    IMPRESSION:  No acute intracranial abnormality.      SERGE GAYTAN MD         SYSTEM ID:  [id number]   CTA Chest with Contrast    Narrative    EXAM: CTA CHEST WITH CONTRAST, 7/4/2024 12:48 PM    HISTORY: Shortness of breath    COMPARISON: Chest radiograph 9/16/2021    TECHNIQUE:   Imaging protocol: Multiplanar CT images of the chest after  administration of intravenous contrast. Meds given: ISOVUE 370  76mL.  Acquisition: This CT exam was performed using one or more the  following dose reduction techniques: automated exposure control,  adjustment of the mA and/or kV according to patient size, and/or  iterative reconstruction technique.  Processing: 3D rendering on independent workstation using Maximum  Intensity Projection (MIP) was performed and archived to PACS. 3D  reconstructions are interpreted and reported by supervising  radiologist.    FINDINGS:     CHEST:    VESSELS AND HEART: There is adequate contrast bolus in the study.  Contrast bolus adequately opacifies  the pulmonary arteries. No acute  pulmonary emboli to the subsegmental level. Aorta is nondilated and  demonstrates no acute abnormality. No cardiomegaly. Severe coronary  calcification.  LUNGS: Central airways are clear. Diffuse right lung tree-in-bud  opacities, greatest in the right lower lobe and right upper lobe apex.  No pulmonary mass. Scattered discoid and subpleural atelectasis.  PLEURA: Within normal limits.  LYMPH NODES: No enlarged lymph nodes. Prominent mediastinal lymph  nodes.  THYROID: Within normal limits.    BONES AND SOFT TISSUES:  No suspicious osseous lesions. Degenerative periarticular changes and  spinal spondylosis.    UPPER ABDOMEN:  Limited evaluation of the upper abdomen demonstrates no acute  parenchymal abnormalities, nonobstructive bowel gas pattern, and no  free fluid or free air. Cholecystectomy changes.      Impression    IMPRESSION:   1.  No acute pulmonary emboli to the subsegmental level.  2.  Multifocal right lung infiltrates, concerning for  bronchopneumonia.    SERGE GAYTAN MD         SYSTEM ID:  [id number]   Glucose by meter   Result Value Ref Range    GLUCOSE BY METER POCT 203 (H) 70 - 99 mg/dL   UA with Microscopic reflex to Culture    Specimen: Urine, Midstream   Result Value Ref Range    Color Urine Light Yellow Colorless, Straw, Light Yellow, Yellow    Appearance Urine Clear Clear    Glucose Urine Negative Negative mg/dL    Bilirubin Urine Negative Negative    Ketones Urine Negative Negative mg/dL    Specific Gravity Urine 1.029 1.003 - 1.035    Blood Urine Trace (A) Negative    pH Urine 5.0 4.7 - 8.0    Protein Albumin Urine 10 (A) Negative mg/dL    Urobilinogen Urine Normal Normal, 2.0 mg/dL    Nitrite Urine Negative Negative    Leukocyte Esterase Urine Negative Negative    Mucus Urine Present (A) None Seen /LPF    RBC Urine 1 <=2 /HPF    WBC Urine 1 <=5 /HPF    Squamous Epithelials Urine 0 <=1 /HPF    Hyaline Casts Urine 19 (H) <=2 /LPF    Narrative    Urine Culture  not indicated   Troponin T, High Sensitivity   Result Value Ref Range    Troponin T, High Sensitivity 36 (H) <=22 ng/L   Potassium   Result Value Ref Range    Potassium 5.5 (H) 3.4 - 5.3 mmol/L   Blood gas venous   Result Value Ref Range    pH Venous 7.32 7.32 - 7.43    pCO2 Venous 33 (L) 40 - 50 mm Hg    pO2 Venous 55 (H) 25 - 47 mm Hg    Bicarbonate Venous 17 (L) 21 - 28 mmol/L    Base Excess/Deficit Venous -8.0 (L) -3.0 - 3.0 mmol/L    FIO2 40     Oxyhemoglobin Venous 88 (H) 70 - 75 %    O2 Sat, Venous 91.2 (H) 70.0 - 75.0 %    Narrative    In healthy individuals, oxyhemoglobin (O2Hb) and oxygen saturation (SO2) are approximately equal. In the presence of dyshemoglobins, oxyhemoglobin can be considerably lower than oxygen saturation.   Glucose by meter   Result Value Ref Range    GLUCOSE BY METER POCT 185 (H) 70 - 99 mg/dL       Medications   azithromycin (ZITHROMAX) 500 mg in  mL intermittent infusion (500 mg Intravenous $New Bag 7/4/24 1340)   prochlorperazine (COMPAZINE) injection 10 mg (10 mg Intravenous $Given 7/4/24 1135)   lactated ringers BOLUS 1,000 mL (0 mLs Intravenous Stopped 7/4/24 1340)   sodium chloride (PF) 0.9% PF flush 100 mL (100 mLs Intravenous $Given 7/4/24 1240)   iopamidol (ISOVUE-370) solution 76 mL (76 mLs Intravenous $Given 7/4/24 1240)   insulin (regular) (HumuLIN R/NovoLIN R) injection 5 Units (5 Units Intravenous $Given 7/4/24 1253)   cefTRIAXone IN D5W (ROCEPHIN) intermittent infusion 2 g (0 g Intravenous Stopped 7/4/24 1340)       Assessments & Plan (with Medical Decision Making)     I have reviewed the nursing notes.    I have reviewed the findings, diagnosis, plan and need for follow up with the patient.    Summary:  Patient presents to the ER today for chills, shortness of breath, nausea, and vomiting.  Potential diagnosis which have been considered and evaluated include MI/NSTEMI, arrhythmia, pneumonia, PE, DKA, viral illness, gastroenteritis, pneumothorax, as well as  others. Many of these have been excluded using the various modalities and assessment as noted on the chart. At the present time, the diagnosis given seems to be the most likely right sided pneumonia causing acute respiratory failure with hypoxia.  Upon arrival, vitals signs show temperature of 38.6  C with a pulse of 93.  Respirations 28 with oxygenation of 90% on O2 via NC at 4 L.  /60.  The patient is alert and oriented but is having nausea and vomiting on arrival.  Patient also is having rigors on arrival.  Patient did receive ondansetron 4 mg IV prior to arrival by EMS.  Physical examination does show dim bilateral bases.  Also complains of left-sided chest pain that is nontender upon palpation.  ECG obtained showing no acute abnormalities.  Prochlorperazine 10 mg IV given for the nausea.  1 L LR initiated for hydration and SIRS criteria being met.  Lab work obtained showing WBC of 7.2 with hemoglobin of 14.8.  Lactic acid was elevated at 3.1.  Venous pH 7.21 with a CO2 of 53 but a pO2 of 20.  Patient placed on OxyMask at 5 L with improvement of oxygenation to 94%.  POCUS echo was conducted showing no obvious abnormality.  No obvious pneumothorax or fluid in bilateral lung bases on POCUS ultrasound.  Sodium 140 with a potassium of 6.0.  Patient does have a BUN of 37.4 with a creatinine 1.94 and a GFR of 39 which is worse than patient baseline.  Glucose 213.  Patient's insulin pump was out of insulin on arrival.  With patient having no insulin in the system, elevated potassium of 6.0, did give regular insulin 5 units IV.  Hepatic functions benign with a normal lipase of 11.  Procalcitonin normal at 0.08.  TSH also normal at 1.89.  Beta hydroxybutyrate also normal at 0.30.  Blood cultures x 2 conducted and pending.  High-sensitivity troponin did come back elevated at 32.  Influenza, COVID, RSV are negative.  With hypoxia and chest pain did do CT angio of the chest with IV contrast which showed no PE but does  have multifocal right lung infiltrates concerning for bronchopneumonia.  Patient also has headache which is new onset with fever so CT of the head was conducted showing no acute intracranial abnormality.  Repeat lab work was obtained 2 hours after.  Potassium has improved to 5.5 but is still mildly elevated.  Venous pH is now normal at 7.32 with a CO2 of 33 and a pO2 of 55 which has improved significantly.  High-sensitivity troponin did elevate from 32 up to 36.  As patient is hypoxic with a right-sided pneumonia, patient will need continued care.  Discussed case with Hospitalist Dr. Howard.  Patient accepted for [facility] admission.        Critical Care Time: None    Impression and plan discussed with patient. Questions answered, concerns addressed, indications for urgent re-evaluation reviewed, and  given. Patient/Parent/Caregiver agree with treatment plan and have no further questions at this time.      This note was created by the Dragon Voice Dictation System. Inadvertent typographical errors, due to software recognition problems, may still exist.             New Prescriptions    No medications on file       Final diagnoses:   Acute respiratory failure with hypoxia (H)   Community acquired pneumonia of right lung, unspecified part of lung       7/4/2024   [facility]       Jacob Nguyen APRN CNP  07/04/24 [id number]

## 2024-07-04 NOTE — ED NOTES
Bed: ED11a  Expected date:   Expected time:   Means of arrival:   Comments:  [facility]

## 2024-07-04 NOTE — H&P
"[facility]    History and Physical - Hospitalist Service       Date of Admission:  7/4/2024    Assessment & Plan      Homer Triana is a 63 year old male admitted on 7/4/2024. He presented with acute hypoxic respiratory failure    Acute hypoxic respiratory failure  -New  -O2 supplementation  -Breathing treatment  -Monitor    Right-sided multifocal pneumonia  -Vanco and Zosyn  -Follow cultures    Right lower extremity cellulitis  -Vanco and Zosyn  -Follow cultures  -IV fluid    Metabolic acidosis-improving    Hypoglycemia-corrected  -  Diabetes mellitus on insulin  -Blood sugar control    Mildly elevated troponin  -Likely demand ischemia    Hypertension  -Blood sugar control    Acute kidney injury  -Hold nephrotoxic agents  -Gentle hydration    Hypokalemia  -No EKG changes  -Monitor    Monitor and treat all chronic conditions                Diet:  Diabetic diet  DVT Prophylaxis: Enoxaparin (Lovenox) SQ  Kimball Catheter: Not present  Lines: None     Cardiac Monitoring: None  Code Status: Full Code      Clinically Significant Risk Factors Present on Admission        # Hyperkalemia: Highest K = 6 mmol/L in last 2 days, will monitor as appropriate           # Hypertension: Noted on problem list             # Obesity: Estimated body mass index is 39.48 kg/m  as calculated from the following:    Height as of this encounter: 1.778 m (5' 10\").    Weight as of this encounter: 124.8 kg (275 lb 2.2 oz).              Disposition Plan     Medically Ready for Discharge:            Bruce Howrad MD  Hospitalist Service  [facility]  Securely message with [employer] (more info)  Text page via [facility] Paging/Directory     ______________________________________________________________________    Chief Complaint   Shortness of breath    History is obtained from the patient    History of Present Illness   Homer Triana is a 63 year old male who presented with acute hypoxic respiratory failure.  Patient " notes that over the last few months, he has been suffering from lower extremity swelling and erythema.  Treated as cellulitis.  This resolved and then recently came back.  Overnight, patient developed an episode of fever chills and rigors.  There was profuse sweating to the point where the bed was completely wet.  Also developed respiratory failure.  Initially patient thought that it was hypoglycemia and that will turn out to be true.  He checked his blood sugar and it was 55.  He called a neighbor and then EMS was activated.  Patient has not have any worsening of his baseline cough that he attributes to lisinopril.  In the ED, workup including CTA of the chest did not show any PE.  On the other hand, patient had severe hypoxemia requiring a nonrebreather.  Initially had severe side acidosis that was corrected with IV fluids.  Upon evaluation, patient has right lower extremity erythema that is warm consistent with cellulitis.  CTA of the chest did show right sided pneumonia.  Patient had mild elevated elevated troponin but EKG was reassuring.  Denies any chest pain.  Noteworthy is that patient had nausea and vomiting.  Vomited twice once at home and then in the ambulance.  Symptoms have now subsided.  Blood sugar is now fairly controlled.    Past Medical History    Past Medical History:   Diagnosis Date    Diabetes (H)     type 1   per patient report.  Diagnosed at age 32 something. currently on insulin pump    Diabetic peripheral neuropathy (H)     Hyperlipidemia     Hypertension     Hypothyroid        Past Surgical History   Past Surgical History:   Procedure Laterality Date    CHOLECYSTECTOMY      COLONOSCOPY - HIM SCAN  05/12/2015    Colonoscopy, Dr Huber 5/12/2015       Prior to Admission Medications   Prior to Admission Medications   Prescriptions Last Dose Informant Patient Reported? Taking?   BAQSIMI TWO PACK 3 MG/DOSE POWD   Yes No   Sig: Apply 3 mg into one nostril as directed once as needed   Patient not  taking: Reported on 2021   Blood Glucose Monitoring Suppl (ACCU-CHEK GUIDE) w/Device KIT   Yes No   Sig: by In Vitro route 4 times daily   COMBIGAN 0.2-0.5 % ophthalmic solution   Yes No   Sig: Place 1 drop into the right eye 2 times daily   INSULIN PUMP - OUTPATIENT   Yes No   Sig: Insulin Pump Brand: Medtronic 770 G  Pump Basal Rates-Times:1.3 units/hour at midnight, 1.1 at 3:00 a.m., 1.1 at noon, and 1.2 at 3:00 p.m  CARB RATIO: 15  Correction factor: 35   Pt and Provider established A1C goal: 8.0  Active Insulin Time:  4 hours  Average total daily dose of insulin is 44 units, 64% basal and 36% bolus insulin  Provider: Lindsay J Schwab-Peterson, CNP   MULTI MINERALS-AMINO ACIDS PO   Yes No   Sig: Take 1 tablet by mouth every evening    Multiple Vitamins-Minerals (MENS MULTIVITAMIN PO)   Yes No   Sig: Take 1 tablet by mouth every evening   NOVOLOG VIAL 100 UNIT/ML soln 7/3/2024  Yes Yes   Sig: Inject 80 Units Subcutaneous continuous with insulin pump.   Nutritional Supplements (GLUCOSE MANAGEMENT) TABS Unknown  Yes Yes   Sig: Take 1 tablet by mouth daily as needed (low lood glucose)   Vitamin D3 (CHOLECALCIFEROL) 125 MCG (5000 UT) tablet More than a month  Yes Yes   Sig: Take 125 mcg by mouth every evening    blood glucose (ACCU-CHEK GUIDE) test strip   Yes No   Si strip by In Vitro route 4 times daily   dorzolamide (TRUSOPT) 2 % ophthalmic solution   Yes No   Sig: Place 1 drop into the right eye 3 times daily   hydrochlorothiazide (HYDRODIURIL) 25 MG tablet   Yes No   Sig: Take 25 mg by mouth every evening    insulin glargine (LANTUS PEN) 100 UNIT/ML pen   Yes No   Sig: Inject 25 Units Subcutaneous daily as needed for other . Take 25 units within 1-2 hours of pump failure and every 24 hours   Patient not taking: Reported on 2021   levothyroxine (SYNTHROID/LEVOTHROID) 137 MCG tablet   Yes No   Sig: Take 137 mcg by mouth every evening    lisinopril (PRINIVIL/ZESTRIL) 40 MG tablet   Yes No   Sig: Take  40 mg by mouth every evening    metoprolol tartrate (LOPRESSOR) 50 MG tablet   Yes No   Sig: Take 50 mg by mouth 2 times daily    rosuvastatin (CRESTOR) 10 MG tablet 7/3/2024 at 21:00  Yes Yes   Sig: Take 10 mg by mouth every evening       Facility-Administered Medications: None           Physical Exam   Vital Signs: Temp: 100.3  F (37.9  C) Temp src: Tympanic BP: 143/69 Pulse: 89   Resp: 16 SpO2: (!) 90 % O2 Device: Oxymask Oxygen Delivery: 5 LPM  Weight: 275 lbs 2.15 oz    General: Patient lying in bed in nonacute distress  Respiratory: Diminished air entry in both lower zones with faint crackles  Cardiovascular: Regular rate and rhythm with first and second heart sound  GI: Abdomen is obese, soft with positive bowel sounds  Skin: Right lower extremity erythema      Medical Decision Making       75 MINUTES SPENT BY ME on the date of service doing chart review, history, exam, documentation & further activities per the note.      Data     I have personally reviewed the following data over the past 24 hrs:    7.2  \   14.8   / 219     140 107 37.4 (H) /  185 (H)   5.5 (H) 19 (L) 1.94 (H) \     ALT: 34 AST: 26 AP: 80 TBILI: 0.6   ALB: 3.9 TOT PROTEIN: 6.8 LIPASE: 11 (L)     Trop: 36 (H) BNP: N/A     TSH: 1.89 T4: N/A A1C: N/A     Procal: 0.08 CRP: N/A Lactic Acid: 2.2 (H)

## 2024-07-04 NOTE — PHARMACY-VANCOMYCIN DOSING SERVICE
Pharmacy Vancomycin Initial Note  Date of Service 2024  Patient's  1960  63 year old, male    Indication: Skin and Soft Tissue Infection    Current estimated CrCl = Estimated Creatinine Clearance: 53 mL/min (A) (based on SCr of 1.94 mg/dL (H)).    Creatinine for last 3 days  2024: 11:39 AM Creatinine 1.94 mg/dL    Recent Vancomycin Level(s) for last 3 days  No results found for requested labs within last 3 days.      Vancomycin IV Administrations (past 72 hours)                     vancomycin (VANCOCIN) 2,500 mg in 0.9% NaCl 500 mL intermittent infusion (mg) 2,500 mg New Bag 24 1654                    Nephrotoxins and other renal medications (From now, onward)      Start     Dose/Rate Route Frequency Ordered Stop    24 1630  vancomycin (VANCOCIN) 2,500 mg in 0.9% NaCl 500 mL intermittent infusion         2,500 mg  over 120 Minutes Intravenous ONCE 24 1608              Contrast Orders - past 72 hours (72h ago, onward)      Start     Dose/Rate Route Frequency Stop    24 1205  iopamidol (ISOVUE-370) solution 76 mL         76 mL Intravenous ONCE 24 1240            Insight[employer] Prediction of Planned Initial Vancomycin Regimen  Loading dose: 2500mg  Regimen: 1500 mg IV every 24 hours.  Start time: 04:54 on 2024  Exposure target: AUC24 (range)400-600 mg/L.hr   AUC24,ss: 509 mg/L.hr  Probability of AUC24 > 400: 75 %  Ctrough,ss: 16.2 mg/L  Probability of Ctrough,ss > 20: 32 %  Probability of nephrotoxicity (Lodise KAVITA ): 12 %      Plan:  Start vancomycin  2500 mg IV x1 followed by 1500 mg q24h.   Vancomycin monitoring method: AUC  Vancomycin therapeutic monitoring goal: 400-600 mg*h/L  Pharmacy will check vancomycin levels as appropriate in 1-3 Days.    Serum creatinine levels will be ordered daily for the first week of therapy and at least twice weekly for subsequent weeks.      Jose Manuel Jeffrey, LTAC, located within [facility]

## 2024-07-05 LAB
ANION GAP SERPL CALCULATED.3IONS-SCNC: 14 MMOL/L (ref 7–15)
ATRIAL RATE - MUSE: 100 BPM
BASOPHILS # BLD MANUAL: 0 10E3/UL (ref 0–0.2)
BASOPHILS NFR BLD MANUAL: 0 %
BUN SERPL-MCNC: 53.2 MG/DL (ref 8–23)
CALCIUM SERPL-MCNC: 8 MG/DL (ref 8.8–10.2)
CHLORIDE SERPL-SCNC: 102 MMOL/L (ref 98–107)
CREAT SERPL-MCNC: 2.58 MG/DL (ref 0.67–1.17)
DEPRECATED HCO3 PLAS-SCNC: 15 MMOL/L (ref 22–29)
DIASTOLIC BLOOD PRESSURE - MUSE: NORMAL MMHG
EGFRCR SERPLBLD CKD-EPI 2021: 27 ML/MIN/1.73M2
EOSINOPHIL # BLD MANUAL: 0 10E3/UL (ref 0–0.7)
EOSINOPHIL NFR BLD MANUAL: 0 %
ERYTHROCYTE [DISTWIDTH] IN BLOOD BY AUTOMATED COUNT: 13.2 % (ref 10–15)
GLUCOSE BLDC GLUCOMTR-MCNC: 281 MG/DL (ref 70–99)
GLUCOSE BLDC GLUCOMTR-MCNC: 319 MG/DL (ref 70–99)
GLUCOSE BLDC GLUCOMTR-MCNC: 377 MG/DL (ref 70–99)
GLUCOSE BLDC GLUCOMTR-MCNC: 397 MG/DL (ref 70–99)
GLUCOSE BLDC GLUCOMTR-MCNC: 423 MG/DL (ref 70–99)
GLUCOSE SERPL-MCNC: 398 MG/DL (ref 70–99)
HCT VFR BLD AUTO: 39.1 % (ref 40–53)
HGB BLD-MCNC: 12.5 G/DL (ref 13.3–17.7)
INTERPRETATION ECG - MUSE: NORMAL
LYMPHOCYTES # BLD MANUAL: 0.8 10E3/UL (ref 0.8–5.3)
LYMPHOCYTES NFR BLD MANUAL: 5 %
MAGNESIUM SERPL-MCNC: 2 MG/DL (ref 1.7–2.3)
MCH RBC QN AUTO: 30.8 PG (ref 26.5–33)
MCHC RBC AUTO-ENTMCNC: 32 G/DL (ref 31.5–36.5)
MCV RBC AUTO: 96 FL (ref 78–100)
MONOCYTES # BLD MANUAL: 0.6 10E3/UL (ref 0–1.3)
MONOCYTES NFR BLD MANUAL: 4 %
MRSA DNA SPEC QL NAA+PROBE: NEGATIVE
NEUTROPHILS # BLD MANUAL: 14.2 10E3/UL (ref 1.6–8.3)
NEUTROPHILS NFR BLD MANUAL: 91 %
NRBC # BLD AUTO: 0 10E3/UL
NRBC BLD AUTO-RTO: 0 /100
P AXIS - MUSE: 38 DEGREES
PLAT MORPH BLD: ABNORMAL
PLATELET # BLD AUTO: 150 10E3/UL (ref 150–450)
POTASSIUM SERPL-SCNC: 5.4 MMOL/L (ref 3.4–5.3)
POTASSIUM SERPL-SCNC: 6.3 MMOL/L (ref 3.4–5.3)
PR INTERVAL - MUSE: 154 MS
QRS DURATION - MUSE: 92 MS
QT - MUSE: 344 MS
QTC - MUSE: 443 MS
R AXIS - MUSE: -26 DEGREES
RBC # BLD AUTO: 4.06 10E6/UL (ref 4.4–5.9)
RBC MORPH BLD: ABNORMAL
SA TARGET DNA: POSITIVE
SODIUM SERPL-SCNC: 131 MMOL/L (ref 135–145)
SYSTOLIC BLOOD PRESSURE - MUSE: NORMAL MMHG
T AXIS - MUSE: 65 DEGREES
VENTRICULAR RATE- MUSE: 100 BPM
WBC # BLD AUTO: 15.6 10E3/UL (ref 4–11)

## 2024-07-05 PROCEDURE — 85007 BL SMEAR W/DIFF WBC COUNT: CPT | Performed by: INTERNAL MEDICINE

## 2024-07-05 PROCEDURE — 93010 ELECTROCARDIOGRAM REPORT: CPT | Performed by: INTERNAL MEDICINE

## 2024-07-05 PROCEDURE — 84132 ASSAY OF SERUM POTASSIUM: CPT | Performed by: INTERNAL MEDICINE

## 2024-07-05 PROCEDURE — 83735 ASSAY OF MAGNESIUM: CPT | Performed by: INTERNAL MEDICINE

## 2024-07-05 PROCEDURE — 250N000012 HC RX MED GY IP 250 OP 636 PS 637: Performed by: INTERNAL MEDICINE

## 2024-07-05 PROCEDURE — 36415 COLL VENOUS BLD VENIPUNCTURE: CPT | Performed by: INTERNAL MEDICINE

## 2024-07-05 PROCEDURE — 250N000013 HC RX MED GY IP 250 OP 250 PS 637: Performed by: INTERNAL MEDICINE

## 2024-07-05 PROCEDURE — 258N000003 HC RX IP 258 OP 636: Performed by: INTERNAL MEDICINE

## 2024-07-05 PROCEDURE — 99233 SBSQ HOSP IP/OBS HIGH 50: CPT | Performed by: INTERNAL MEDICINE

## 2024-07-05 PROCEDURE — 120N000001 HC R&B MED SURG/OB

## 2024-07-05 PROCEDURE — 93005 ELECTROCARDIOGRAM TRACING: CPT

## 2024-07-05 PROCEDURE — 85025 COMPLETE CBC W/AUTO DIFF WBC: CPT | Performed by: INTERNAL MEDICINE

## 2024-07-05 PROCEDURE — 250N000011 HC RX IP 250 OP 636: Performed by: INTERNAL MEDICINE

## 2024-07-05 PROCEDURE — 250N000009 HC RX 250: Performed by: INTERNAL MEDICINE

## 2024-07-05 PROCEDURE — 80048 BASIC METABOLIC PNL TOTAL CA: CPT | Performed by: INTERNAL MEDICINE

## 2024-07-05 RX ORDER — INSULIN ASPART 100 [IU]/ML
70 INJECTION, SOLUTION INTRAVENOUS; SUBCUTANEOUS
COMMUNITY
Start: 2024-04-09

## 2024-07-05 RX ORDER — NICOTINE POLACRILEX 4 MG
15-30 LOZENGE BUCCAL
Status: DISCONTINUED | OUTPATIENT
Start: 2024-07-05 | End: 2024-07-05

## 2024-07-05 RX ORDER — LEVOTHYROXINE SODIUM 175 UG/1
1 TABLET ORAL EVERY MORNING
COMMUNITY
Start: 2024-04-10

## 2024-07-05 RX ORDER — BRIMONIDINE TARTRATE 2 MG/ML
1 SOLUTION/ DROPS OPHTHALMIC 2 TIMES DAILY
COMMUNITY
Start: 2024-06-11

## 2024-07-05 RX ORDER — DEXTROSE MONOHYDRATE 25 G/50ML
25-50 INJECTION, SOLUTION INTRAVENOUS
Status: DISCONTINUED | OUTPATIENT
Start: 2024-07-05 | End: 2024-07-05

## 2024-07-05 RX ORDER — BRIMONIDINE TARTRATE 2 MG/ML
1 SOLUTION/ DROPS OPHTHALMIC 2 TIMES DAILY
Status: DISCONTINUED | OUTPATIENT
Start: 2024-07-05 | End: 2024-07-09 | Stop reason: HOSPADM

## 2024-07-05 RX ORDER — LISINOPRIL 20 MG/1
40 TABLET ORAL EVERY EVENING
Status: DISCONTINUED | OUTPATIENT
Start: 2024-07-05 | End: 2024-07-09 | Stop reason: HOSPADM

## 2024-07-05 RX ADMIN — CEFEPIME 2 G: 2 INJECTION, POWDER, FOR SOLUTION INTRAVENOUS at 08:00

## 2024-07-05 RX ADMIN — Medication 1500 MG: at 15:56

## 2024-07-05 RX ADMIN — METOPROLOL TARTRATE 50 MG: 25 TABLET, FILM COATED ORAL at 08:10

## 2024-07-05 RX ADMIN — ENOXAPARIN SODIUM 40 MG: 40 INJECTION SUBCUTANEOUS at 21:33

## 2024-07-05 RX ADMIN — CEFEPIME 2 G: 2 INJECTION, POWDER, FOR SOLUTION INTRAVENOUS at 19:54

## 2024-07-05 RX ADMIN — DORZOLAMIDE 1 DROP: 20 SOLUTION/ DROPS OPHTHALMIC at 08:04

## 2024-07-05 RX ADMIN — ROSUVASTATIN 10 MG: 10 TABLET, FILM COATED ORAL at 21:33

## 2024-07-05 RX ADMIN — SODIUM ZIRCONIUM CYCLOSILICATE 10 G: 5 POWDER, FOR SUSPENSION ORAL at 08:13

## 2024-07-05 RX ADMIN — LISINOPRIL 40 MG: 20 TABLET ORAL at 21:33

## 2024-07-05 RX ADMIN — INSULIN ASPART 6 UNITS: 100 INJECTION, SOLUTION INTRAVENOUS; SUBCUTANEOUS at 18:51

## 2024-07-05 RX ADMIN — INSULIN ASPART 4 UNITS: 100 INJECTION, SOLUTION INTRAVENOUS; SUBCUTANEOUS at 13:08

## 2024-07-05 RX ADMIN — METOPROLOL TARTRATE 50 MG: 25 TABLET, FILM COATED ORAL at 21:33

## 2024-07-05 RX ADMIN — DORZOLAMIDE 1 DROP: 20 SOLUTION/ DROPS OPHTHALMIC at 21:34

## 2024-07-05 RX ADMIN — INSULIN ASPART 5 UNITS: 100 INJECTION, SOLUTION INTRAVENOUS; SUBCUTANEOUS at 08:09

## 2024-07-05 RX ADMIN — INSULIN GLARGINE 8 UNITS: 100 INJECTION, SOLUTION SUBCUTANEOUS at 19:56

## 2024-07-05 RX ADMIN — BRIMONIDINE TARTRATE 1 DROP: 2 SOLUTION OPHTHALMIC at 21:34

## 2024-07-05 RX ADMIN — SODIUM CHLORIDE: 9 INJECTION, SOLUTION INTRAVENOUS at 21:32

## 2024-07-05 ASSESSMENT — ACTIVITIES OF DAILY LIVING (ADL)
ADLS_ACUITY_SCORE: 33
ADLS_ACUITY_SCORE: 35
ADLS_ACUITY_SCORE: 29
ADLS_ACUITY_SCORE: 35
ADLS_ACUITY_SCORE: 35
ADLS_ACUITY_SCORE: 29
ADLS_ACUITY_SCORE: 35
ADLS_ACUITY_SCORE: 29
ADLS_ACUITY_SCORE: 35
ADLS_ACUITY_SCORE: 29
ADLS_ACUITY_SCORE: 35

## 2024-07-05 NOTE — PLAN OF CARE
[facility] Inpatient Admission Note:    Patient admitted to 3112/3112-1 at approximately 16:04 via wheel chair accompanied by staff from emergency room . Report received from EZEQUIEL Winston in SBAR format at 15:40 via telephone. Patient transferred to bed via self.. Patient is alert and oriented X 3, denies pain; rates at 0 on 0-10 scale.  Patient oriented to room, unit, hourly rounding, and plan of care. Explained admission packet and patient handbook with patient bill of rights brochure. Will continue to monitor and document as needed.     Inpatient Nursing criteria listed below was met:    Health care directives status obtained and documented: Yes    Patient identifies a surrogate decision maker: Yes If yes, who:Geetha (sister) Contact Information: see face sheet      If initial lactic acid greater than 2.0, repeat lactic acid drawn within one hour of arrival to unit: Yes. If no, state reason:     Clergy visit ordered if patient requests:  Pt denies at this time.    Skin issues/needs documented: Yes    Isolation Patient: no Education given, correct sign in place and documentation row added to PCS:  Yes    Fall Prevention Yes: Care plan updated, education given and documented, sticker and magnet in place: Yes    Care Plan initiated: Yes    Education Documented (including assessment): Yes    .

## 2024-07-05 NOTE — PLAN OF CARE
Pt A&O x 4.  Pt afebrile this shfit. Pt remains on room air.  O2 sats remain in low 90's on room air. Lung sounds are diminished. Pt denies pain this shift. Pt receives NS 75 mL/hr  this shift via peripheral IV.      Pt bilateral lower extremities are red, katie with scattered scabs. Borders marked to RLE. Blister to RLE remains intact.   Pt denies nausea at this time. Pt tolerating full liquid diet, and advanced to regular diet this shift. Blood sugar check 377 this morning prior to breakfast.  Pt receives sliding scale novolog per MAR. Blood glucose 281 on recheck.      Pt continent of bladder. Pt assist of two with gait belt to transfer to bed this shift.     Face to face report given with opportunity to observe patient.    Report given to EZEQUIEL Pinzon.     Arlette Daniels RN   7/5/2024  4:14 PM

## 2024-07-05 NOTE — PLAN OF CARE
Pt A&O x 4.  Pt febrile, 100.3. Pt titrated to room air. O2 sats remain in low 90's on room air. Lung sounds are diminished. Pt denies pain this shift. Pt receives IV vanco, and NS at 75 mL/hr  this shift via peripheral IV.     Pt bilateral lower extremities are red, katie with scattered scabs. Pt states chronic neuropathy to lower extremities.   Pt denies nausea at this time. Pt tolerating full liquid diet at this time.     Pt continent of bladder. Pt assist of two with gait belt to transfer to bed this shift.     Face to face report given with opportunity to observe patient.    Report given to EZEQUIEL Ly.     Arlette Daniels RN   7/4/2024  8:08 PM

## 2024-07-05 NOTE — PROGRESS NOTES
"[facility] [address]    Medicine Progress Note - Hospitalist Service    Date of Admission:  7/4/2024    Assessment & Plan   Cellulitis and soft tissue infection  Resolving  Continue antibiotics  Cultures no growth to date    Acute hypoxic respiratory failure  Resolving    Right-sided pneumonia  -Resolving-continue antibiotics    Metabolic acidosis  Resolving    Diabetes mellitus on insulin pump  Blood sugar control    Hypertension.   Blood pressure control    Acute kidney injury  Gentle hydration monitor    Hyperkalemia.   Lokelma                Diet: Consistent Carbohydrate Diet Moderate Consistent Carb (60 g CHO per Meal) Diet    DVT Prophylaxis: Enoxaparin (Lovenox) SQ  Kimball Catheter: Not present  Lines: None     Cardiac Monitoring: None  Code Status: Full Code      Clinically Significant Risk Factors Present on Admission        # Hyperkalemia: Highest K = 6.3 mmol/L in last 2 days, will monitor as appropriate   # Hypocalcemia: Lowest Ca = 8 mg/dL in last 2 days, will monitor and replace as appropriate        # Acute Kidney Injury, unspecified: based on a >150% or 0.3 mg/dL increase in last creatinine compared to past 90 day average, will monitor renal function  # Hypertension: Noted on problem list   # Acute Respiratory Failure: Documented O2 saturation < 91%.  Continue supplemental oxygen as needed            # Obesity: Estimated body mass index is 39.48 kg/m  as calculated from the following:    Height as of this encounter: 1.778 m (5' 10\").    Weight as of this encounter: 124.8 kg (275 lb 2.2 oz).              Disposition Plan     Medically Ready for Discharge:              Bruce Howard MD  Hospitalist Service  [facility] [address]  Securely message with [employer] (more info)  Text page via [facility] Paging/Directory   ______________________________________________________________________    Interval History   Patient is a pleasant 63-year-old gentleman who presented with lower extremity cellulitis and acute " respiratory failure.  Patient's breathing is improved.  DVT manage  Right and with pain.  Lactic has improved.  Potassium still very high.  Treating.  Patient has no chest pain.  No fever.  No nausea or vomiting.    Physical Exam   Vital Signs: Temp: 98.1  F (36.7  C) Temp src: Tympanic BP: (!) 122/48 Pulse: 78   Resp: 18 SpO2: 96 % O2 Device: None (Room air) Oxygen Delivery: 3 LPM  Weight: 275 lbs 2.15 oz    General: Patient laying in bed in no acute distress  Respiratory: Diminished air entry in both lower zones with faint crackles  Cardiovascular: Regular rate and rhythm with first and second    GI: Abdomen is soft with positive bowel sounds  Integumentary: Right lower extremity erythema and anterior shin    Medical Decision Making       50  MINUTES SPENT BY ME on the date of service doing chart review, history, exam, documentation & further activities per the note.      Data

## 2024-07-05 NOTE — PHARMACY
Pharmacy Antimicrobial Stewardship Assessment     Current Antimicrobial Therapy:  Anti-infectives (From now, onward)      Start     Dose/Rate Route Frequency Ordered Stop    24 1600  vancomycin (VANCOCIN) 1,500 mg in 0.9% NaCl 250 mL intermittent infusion         1,500 mg  over 90 Minutes Intravenous EVERY 24 HOURS 24 1710      24 1700  ceFEPIme (MAXIPIME) 2 g vial to attach to  mL bag for ADULTS or 50 mL bag for PEDS         2 g  over 30 Minutes Intravenous EVERY 12 HOURS 24 1559            Indication: cellulitis, multifocal pneumonia    Days of Therapy: 2     Pertinent Labs:    Recent Labs   Lab Test 24  0455 24  1139 21  0648 21  0452 21  1020 19  1632 18  0404   WBC 15.6* 7.2 6.0 9.8 9.3 7.8 6.1       Recent Labs   Lab Test 24  1922 24  1400 24  1139 21  1020 10/06/18  1710   LACT 1.3 2.2* 3.1*   < > 2.3*   PCAL  --   --  0.08  --  <0.05    < > = values in this interval not displayed.        Temperature:  Temp (24hrs), Av.3  F (37.4  C), Min:98.1  F (36.7  C), Max:100.3  F (37.9  C)      Culture Results:   30-Day Micro Results       Collected Updated Procedure Result Status      2024 1723 2024 1731 MRSA MSSA PCR, Nasal Swab [[id number]]   Swab from Nares, Bilateral    In process Component Value   No component results            2024 1249 2024 0628 Blood Culture Arm, Left [[id number]]   Blood from Arm, Left    Preliminary result Component Value   Culture No growth after 12 hours  [P]                2024 1211 2024 0628 Blood Culture Arm, Left [[id number]]   Blood from Arm, Left    Preliminary result Component Value   Culture No growth after 12 hours  [P]                2024 1134 2024 1216 Symptomatic Influenza A/B, RSV, & SARS-CoV2 PCR (COVID-19) Nose [[id number]]    Swab from Nose    Final result Component Value   Influenza A PCR Negative   Influenza B PCR  Negative   RSV PCR Negative   SARS CoV2 PCR Negative   NEGATIVE: SARS-CoV-2 (COVID-19) RNA not detected, presumed negative.                   Recommendations/Interventions:  1. Vancomycin level ordered for tomorrow with am labs. Pharmacy will assess and adjust vancomycin dosing at that time if needed.     Jolly Carmona, [facility]  July 5, 2024

## 2024-07-05 NOTE — PROGRESS NOTES
Medical record and Dalton Score reviewed. Participated in interdisciplinary rounds.  Updated PCP. No apparent needs noted at this time. Care Transitions will remain available if needs arise.       Letty Beasley CM

## 2024-07-05 NOTE — PLAN OF CARE
VS as charted. Low-grade fever: 99.9 F. Denies pain.   A&O x 4.   RA. Lungs: Diminished throughout.   No N/V/D.   All quadrants audible & normoactive.   BLE: Red & katie w/ scabs. Chronic neuropathy pain per pt.   B @ HS. 4 units of Novolog given. BG @ 0120 397. Received order from nocturnist for one time dose of 7 units of Novolog.   Full liquid diet.   Pt has been sleeping on & off throughout the shift.   Antx: IV Maxipime & IV Vancomycin   Assist x 1 w/ GB. Generalized weakness.   Call light within reach, use of call light appropriately, & makes needs known.   Face to face report given with opportunity to observe patient.    Report given to Arlette PONCE RN.     Nadira Gong RN   2024  7:37 AM

## 2024-07-05 NOTE — MEDICATION SCRIBE - ADMISSION MEDICATION HISTORY
Medication Scribe Admission Medication History    Admission medication history is complete. The information provided in this note is only as accurate as the sources available at the time of the update.    Information Source(s): [facility], [facility], and [facility]  via in-person    Pertinent Information:   Patient manages his own medications at home and is a good historian.  Novolog: Patient uses continuously with insulin pump and reports he does a self bolus of 10 units with meals.    Changes made to PTA medication list:  Added: Alphagan, Vit C  Deleted: Combigan, Baqsimi, Vit D, MTV, Multi-Mineral w Amino acids, Cinnamon  Changed: Updated Levothyroxine    Allergies reviewed with patient and updates made in EHR: yes    Medication History Completed By: Ashley Morrison 7/5/2024 12:42 PM    PTA Med List   Medication Sig Note Last Dose    ASCORBIC ACID PO Take 1,000 mg by mouth daily (Gummy)  7/3/2024 at 0800    blood glucose (ACCU-CHEK GUIDE) test strip 1 strip by In Vitro route 4 times daily  7/3/2024    Blood Glucose Monitoring Suppl (ACCU-CHEK GUIDE) w/Device KIT by In Vitro route 4 times daily  7/3/2024    brimonidine (ALPHAGAN) 0.2 % ophthalmic solution Place 1 drop into both eyes 2 times daily (AM and PM)  7/3/2024 at 2000    dorzolamide (TRUSOPT) 2 % ophthalmic solution Place 1 drop into both eyes 2 times daily  7/3/2024 at 2000    hydrochlorothiazide (HYDRODIURIL) 25 MG tablet Take 25 mg by mouth every evening   7/3/2024 at 2000    INSULIN PUMP - OUTPATIENT Insulin Pump Brand: Medtronic 770 G  Pump Basal Rates-Times:1.3 units/hour at midnight, 1.1 at 3:00 a.m., 1.1 at noon, and 1.2 at 3:00 p.m  CARB RATIO: 15  Correction factor: 35  Pt and Provider established A1C goal: 8.0  Active Insulin Time:  4 hours  Average total daily dose of insulin is 44 units, 64% basal and 36% bolus insulin  Provider: Lindsay J Schwab-Peterson, MERON      levothyroxine (SYNTHROID/LEVOTHROID) 175 MCG tablet Take 1 tablet  by mouth every morning upon waking or at least 30 minutes before first food and drink of the day  7/3/2024 at 0700    lisinopril (PRINIVIL/ZESTRIL) 40 MG tablet Take 40 mg by mouth every evening   7/3/2024 at 2000    metoprolol tartrate (LOPRESSOR) 50 MG tablet Take 50 mg by mouth 2 times daily   7/3/2024 at 2000    NOVOLOG VIAL 100 UNIT/ML soln Inject 70 Units Subcutaneous Continuous with insulin pump 7/5/2024: Pt states gives self bolus with meals 7/3/2024    rosuvastatin (CRESTOR) 10 MG tablet Take 10 mg by mouth every evening   7/3/2024 at 2000

## 2024-07-05 NOTE — PROVIDER NOTIFICATION
Pt lactic elevated on recheck this shift. Lactic acid 2.2.  Provider notified. Repeat lactic acid order for AM draw ordered.

## 2024-07-06 ENCOUNTER — APPOINTMENT (OUTPATIENT)
Dept: CT IMAGING | Facility: HOSPITAL | Age: 64
DRG: 602 | End: 2024-07-06
Attending: INTERNAL MEDICINE
Payer: MEDICARE

## 2024-07-06 PROBLEM — N17.0 ACUTE KIDNEY FAILURE WITH TUBULAR NECROSIS (H): Status: ACTIVE | Noted: 2024-07-06

## 2024-07-06 LAB
ALBUMIN MFR UR ELPH: 78.5 MG/DL
ALBUMIN UR-MCNC: 70 MG/DL
ANION GAP SERPL CALCULATED.3IONS-SCNC: 14 MMOL/L (ref 7–15)
ANION GAP SERPL CALCULATED.3IONS-SCNC: 15 MMOL/L (ref 7–15)
APPEARANCE UR: ABNORMAL
BASE EXCESS BLDV CALC-SCNC: -11.6 MMOL/L (ref -3–3)
BASOPHILS # BLD AUTO: 0 10E3/UL (ref 0–0.2)
BASOPHILS NFR BLD AUTO: 0 %
BILIRUB UR QL STRIP: NEGATIVE
BUN SERPL-MCNC: 67.1 MG/DL (ref 8–23)
BUN SERPL-MCNC: 71.2 MG/DL (ref 8–23)
CALCIUM SERPL-MCNC: 7.9 MG/DL (ref 8.8–10.2)
CALCIUM SERPL-MCNC: 8 MG/DL (ref 8.8–10.2)
CHLORIDE SERPL-SCNC: 104 MMOL/L (ref 98–107)
CHLORIDE SERPL-SCNC: 106 MMOL/L (ref 98–107)
COLOR UR AUTO: YELLOW
CREAT SERPL-MCNC: 4.09 MG/DL (ref 0.67–1.17)
CREAT SERPL-MCNC: 4.2 MG/DL (ref 0.67–1.17)
CREAT UR-MCNC: 232.3 MG/DL
DEPRECATED HCO3 PLAS-SCNC: 13 MMOL/L (ref 22–29)
DEPRECATED HCO3 PLAS-SCNC: 14 MMOL/L (ref 22–29)
EGFRCR SERPLBLD CKD-EPI 2021: 15 ML/MIN/1.73M2
EGFRCR SERPLBLD CKD-EPI 2021: 16 ML/MIN/1.73M2
EOSINOPHIL # BLD AUTO: 0.1 10E3/UL (ref 0–0.7)
EOSINOPHIL NFR BLD AUTO: 1 %
ERYTHROCYTE [DISTWIDTH] IN BLOOD BY AUTOMATED COUNT: 13.2 % (ref 10–15)
GLUCOSE BLDC GLUCOMTR-MCNC: 271 MG/DL (ref 70–99)
GLUCOSE BLDC GLUCOMTR-MCNC: 300 MG/DL (ref 70–99)
GLUCOSE BLDC GLUCOMTR-MCNC: 314 MG/DL (ref 70–99)
GLUCOSE BLDC GLUCOMTR-MCNC: 316 MG/DL (ref 70–99)
GLUCOSE BLDC GLUCOMTR-MCNC: 356 MG/DL (ref 70–99)
GLUCOSE SERPL-MCNC: 239 MG/DL (ref 70–99)
GLUCOSE SERPL-MCNC: 355 MG/DL (ref 70–99)
GLUCOSE UR STRIP-MCNC: 50 MG/DL
HCO3 BLDV-SCNC: 15 MMOL/L (ref 21–28)
HCT VFR BLD AUTO: 38.6 % (ref 40–53)
HGB BLD-MCNC: 12.5 G/DL (ref 13.3–17.7)
HGB UR QL STRIP: ABNORMAL
IMM GRANULOCYTES # BLD: 0.1 10E3/UL
IMM GRANULOCYTES NFR BLD: 1 %
KETONES UR STRIP-MCNC: 10 MG/DL
LEUKOCYTE ESTERASE UR QL STRIP: ABNORMAL
LYMPHOCYTES # BLD AUTO: 0.7 10E3/UL (ref 0.8–5.3)
LYMPHOCYTES NFR BLD AUTO: 7 %
MCH RBC QN AUTO: 31.1 PG (ref 26.5–33)
MCHC RBC AUTO-ENTMCNC: 32.4 G/DL (ref 31.5–36.5)
MCV RBC AUTO: 96 FL (ref 78–100)
MONOCYTES # BLD AUTO: 1.1 10E3/UL (ref 0–1.3)
MONOCYTES NFR BLD AUTO: 11 %
MUCOUS THREADS #/AREA URNS LPF: PRESENT /LPF
NEUTROPHILS # BLD AUTO: 8.3 10E3/UL (ref 1.6–8.3)
NEUTROPHILS NFR BLD AUTO: 80 %
NITRATE UR QL: NEGATIVE
NRBC # BLD AUTO: 0 10E3/UL
NRBC BLD AUTO-RTO: 0 /100
O2/TOTAL GAS SETTING VFR VENT: 21 %
OXYHGB MFR BLDV: 79 % (ref 70–75)
PCO2 BLDV: 36 MM HG (ref 40–50)
PH BLDV: 7.23 [PH] (ref 7.32–7.43)
PH UR STRIP: 5 [PH] (ref 4.7–8)
PLATELET # BLD AUTO: 157 10E3/UL (ref 150–450)
PO2 BLDV: 43 MM HG (ref 25–47)
POTASSIUM SERPL-SCNC: 5.1 MMOL/L (ref 3.4–5.3)
POTASSIUM SERPL-SCNC: 5.6 MMOL/L (ref 3.4–5.3)
PROT/CREAT 24H UR: 0.34 MG/MG CR (ref 0–0.2)
RBC # BLD AUTO: 4.02 10E6/UL (ref 4.4–5.9)
RBC URINE: 26 /HPF
SAO2 % BLDV: 80.9 % (ref 70–75)
SODIUM SERPL-SCNC: 132 MMOL/L (ref 135–145)
SODIUM SERPL-SCNC: 134 MMOL/L (ref 135–145)
SP GR UR STRIP: 1.03 (ref 1–1.03)
SQUAMOUS EPITHELIAL: 0 /HPF
UROBILINOGEN UR STRIP-MCNC: NORMAL MG/DL
VANCOMYCIN SERPL-MCNC: 30.1 UG/ML
WBC # BLD AUTO: 10.4 10E3/UL (ref 4–11)
WBC CLUMPS #/AREA URNS HPF: PRESENT /HPF
WBC URINE: 44 /HPF

## 2024-07-06 PROCEDURE — 80048 BASIC METABOLIC PNL TOTAL CA: CPT | Performed by: INTERNAL MEDICINE

## 2024-07-06 PROCEDURE — 85025 COMPLETE CBC W/AUTO DIFF WBC: CPT | Performed by: INTERNAL MEDICINE

## 2024-07-06 PROCEDURE — 80202 ASSAY OF VANCOMYCIN: CPT | Performed by: INTERNAL MEDICINE

## 2024-07-06 PROCEDURE — 87086 URINE CULTURE/COLONY COUNT: CPT | Performed by: INTERNAL MEDICINE

## 2024-07-06 PROCEDURE — 99233 SBSQ HOSP IP/OBS HIGH 50: CPT | Performed by: INTERNAL MEDICINE

## 2024-07-06 PROCEDURE — 36415 COLL VENOUS BLD VENIPUNCTURE: CPT | Performed by: INTERNAL MEDICINE

## 2024-07-06 PROCEDURE — 74176 CT ABD & PELVIS W/O CONTRAST: CPT | Mod: MG

## 2024-07-06 PROCEDURE — 81001 URINALYSIS AUTO W/SCOPE: CPT | Performed by: INTERNAL MEDICINE

## 2024-07-06 PROCEDURE — 120N000001 HC R&B MED SURG/OB

## 2024-07-06 PROCEDURE — 250N000011 HC RX IP 250 OP 636: Performed by: INTERNAL MEDICINE

## 2024-07-06 PROCEDURE — 84156 ASSAY OF PROTEIN URINE: CPT | Performed by: INTERNAL MEDICINE

## 2024-07-06 PROCEDURE — 82805 BLOOD GASES W/O2 SATURATION: CPT | Performed by: INTERNAL MEDICINE

## 2024-07-06 PROCEDURE — 258N000002 HC RX IP 258 OP 250: Performed by: INTERNAL MEDICINE

## 2024-07-06 PROCEDURE — 250N000013 HC RX MED GY IP 250 OP 250 PS 637: Performed by: INTERNAL MEDICINE

## 2024-07-06 PROCEDURE — 258N000003 HC RX IP 258 OP 636: Performed by: INTERNAL MEDICINE

## 2024-07-06 PROCEDURE — 250N000009 HC RX 250: Performed by: INTERNAL MEDICINE

## 2024-07-06 RX ORDER — ONDANSETRON 2 MG/ML
4 INJECTION INTRAMUSCULAR; INTRAVENOUS EVERY 6 HOURS PRN
Status: DISCONTINUED | OUTPATIENT
Start: 2024-07-06 | End: 2024-07-09 | Stop reason: HOSPADM

## 2024-07-06 RX ORDER — CEFEPIME HYDROCHLORIDE 2 G/1
2 INJECTION, POWDER, FOR SOLUTION INTRAVENOUS EVERY 24 HOURS
Status: DISCONTINUED | OUTPATIENT
Start: 2024-07-07 | End: 2024-07-09 | Stop reason: HOSPADM

## 2024-07-06 RX ADMIN — DORZOLAMIDE 1 DROP: 20 SOLUTION/ DROPS OPHTHALMIC at 20:34

## 2024-07-06 RX ADMIN — DORZOLAMIDE 1 DROP: 20 SOLUTION/ DROPS OPHTHALMIC at 08:16

## 2024-07-06 RX ADMIN — SODIUM BICARBONATE: 84 INJECTION, SOLUTION INTRAVENOUS at 14:53

## 2024-07-06 RX ADMIN — ENOXAPARIN SODIUM 40 MG: 40 INJECTION SUBCUTANEOUS at 20:33

## 2024-07-06 RX ADMIN — METOPROLOL TARTRATE 50 MG: 25 TABLET, FILM COATED ORAL at 20:33

## 2024-07-06 RX ADMIN — LEVOTHYROXINE SODIUM 175 MCG: 0.1 TABLET ORAL at 06:40

## 2024-07-06 RX ADMIN — ROSUVASTATIN 10 MG: 10 TABLET, FILM COATED ORAL at 20:33

## 2024-07-06 RX ADMIN — LISINOPRIL 40 MG: 20 TABLET ORAL at 20:33

## 2024-07-06 RX ADMIN — BRIMONIDINE TARTRATE 1 DROP: 2 SOLUTION OPHTHALMIC at 08:05

## 2024-07-06 RX ADMIN — SODIUM CHLORIDE: 9 INJECTION, SOLUTION INTRAVENOUS at 06:45

## 2024-07-06 RX ADMIN — BRIMONIDINE TARTRATE 1 DROP: 2 SOLUTION OPHTHALMIC at 20:34

## 2024-07-06 RX ADMIN — METOPROLOL TARTRATE 50 MG: 25 TABLET, FILM COATED ORAL at 08:00

## 2024-07-06 RX ADMIN — CEFEPIME 2 G: 2 INJECTION, POWDER, FOR SOLUTION INTRAVENOUS at 07:36

## 2024-07-06 ASSESSMENT — ACTIVITIES OF DAILY LIVING (ADL)
ADLS_ACUITY_SCORE: 36
ADLS_ACUITY_SCORE: 36
ADLS_ACUITY_SCORE: 33
ADLS_ACUITY_SCORE: 38
ADLS_ACUITY_SCORE: 36
ADLS_ACUITY_SCORE: 37
ADLS_ACUITY_SCORE: 36
ADLS_ACUITY_SCORE: 38
ADLS_ACUITY_SCORE: 33
ADLS_ACUITY_SCORE: 37
ADLS_ACUITY_SCORE: 36
ADLS_ACUITY_SCORE: 38
ADLS_ACUITY_SCORE: 37
ADLS_ACUITY_SCORE: 37
ADLS_ACUITY_SCORE: 33
ADLS_ACUITY_SCORE: 38

## 2024-07-06 NOTE — PLAN OF CARE
Goal Outcome Evaluation:upon general assessment, patient does present with a slight tremor, and was able to ambulate from the bed to the chair and subsequently to the bathroom with stand by assist of staff using the walker, patient hs been incontinent of stool and was dribbling urine this morning, this writer did place a escalera catheter, patient tolerated well but to this point, he is not making much urine, labs were as charted, and MD was text paged to update, the potassium was 5.6, creatinine was 4.09, Calcium was 8.0, among other labs, per ICU, the tely is sinus rhythm in the 70's, patient does have right leg cellulitis, and there is a fluid filled intact blister noted,  patient is denying shortness of breath and chest pain, patient did have an episode of dry heaves this morning but did not actually vomit, patient does use the call light appropriately, vitals as charted.

## 2024-07-06 NOTE — PLAN OF CARE
Goal Outcome Evaluation:Face to face report given with opportunity to observe patient.    Report given to Veronica Gentile RN   7/6/2024  3:22 PM

## 2024-07-06 NOTE — PHARMACY
Pharmacy Antimicrobial Stewardship Assessment     Current Antimicrobial Therapy:  Anti-infectives (From now, onward)      Start     Dose/Rate Route Frequency Ordered Stop    24 0730  ceFEPIme (MAXIPIME) 2 g vial to attach to  mL bag for ADULTS or 50 mL bag for PEDS         2 g  over 30 Minutes Intravenous EVERY 24 HOURS 24 1110              Indication: SSTI, pneumonia    Days of Therapy: 3           Pertinent Labs:    Recent Labs   Lab Test 24  0455 24  1139   WBC 15.6* 7.2       Recent Labs   Lab Test 24  1922 24  1400 24  1139   LACT 1.3 2.2* 3.1*   PCAL  --   --  0.08    < > = values in this interval not displayed.        Temperature:  Temp (24hrs), Av.4  F (36.9  C), Min:97.6  F (36.4  C), Max:99.1  F (37.3  C)      Culture Results:   30-Day Micro Results       Collected Updated Procedure Result Status      2024 1723 2024 MRSA MSSA PCR, Nasal Swab [[id number]]    Swab from Nares, Bilateral    Final result Component Value   MRSA Target DNA Negative   SA Target DNA Positive            2024 1249 2024 0625 Blood Culture Arm, Left [[id number]]   Blood from Arm, Left    Preliminary result Component Value   Culture No growth after 1 day  [P]                2024 1211 2024 0626 Blood Culture Arm, Left [[id number]]   Blood from Arm, Left    Preliminary result Component Value   Culture No growth after 1 day  [P]                2024 1134 2024 1216 Symptomatic Influenza A/B, RSV, & SARS-CoV2 PCR (COVID-19) Nose [[id number]]    Swab from Nose    Final result Component Value   Influenza A PCR Negative   Influenza B PCR Negative   RSV PCR Negative   SARS CoV2 PCR Negative   NEGATIVE: SARS-CoV-2 (COVID-19) RNA not detected, presumed negative.                       Recommendations/Interventions:  1. Decreased cefepime dose to 2g q24h per [place] Renal Dose Adjustment Policy.  2. Consider ordering daily WBC and sputum  culture if possible.     Alva Martinez, [facility]  July 6, 2024

## 2024-07-06 NOTE — PLAN OF CARE
Goal Outcome Evaluation:         Face to face report given with opportunity to observe patient.    Report given to Elif Monique RN   7/6/2024  7:20 PM

## 2024-07-06 NOTE — PROGRESS NOTES
"[facility]    Medicine Progress Note - Hospitalist Service    Date of Admission:  7/4/2024    Assessment & Plan   Cellulitis and soft tissue infection  Resolving  Continue antibiotics  Cultures no growth to date    Acute hypoxic respiratory failure  Resolving    Right-sided pneumonia  -Resolving-continue antibiotics    Metabolic acidosis  Worsening  Bicarb drip    Diabetes mellitus on insulin pump  Blood sugar control    Hypertension.   Blood pressure control    Acute kidney injury on top of CKD  Worsening  CT of the abdomen and pelvis without contrast reassuring  Kimball catheter to monitor I's and O's  Bicarb drip  Monitor kidney function  Hold vancomycin and avoid nephrotoxic agents  Discussed with pharmacy    Hyperkalemia.   Lokelma given yesterday  Consider another dose                Diet: Consistent Carbohydrate Diet Moderate Consistent Carb (60 g CHO per Meal) Diet    DVT Prophylaxis: Enoxaparin (Lovenox) SQ  Kimball Catheter: PRESENT, indication: Acute retention or obstruction  Lines: None     Cardiac Monitoring: ACTIVE order. Indication: hyperkalemia  Code Status: Full Code      Clinically Significant Risk Factors        # Hyperkalemia: Highest K = 6.3 mmol/L in last 2 days, will monitor as appropriate            # Acute Kidney Injury, unspecified: based on a >150% or 0.3 mg/dL increase in last creatinine compared to past 90 day average, will monitor renal function  # Hypertension: Noted on problem list                # Severe Obesity: Estimated body mass index is 40.27 kg/m  as calculated from the following:    Height as of this encounter: 1.778 m (5' 10\").    Weight as of this encounter: 127.3 kg (280 lb 10.3 oz)., PRESENT ON ADMISSION            Disposition Plan     Medically Ready for Discharge:              Bruce Howard MD  Hospitalist Service  [facility]  Securely message with [employer] (more info)  Text page via [facility] Paging/Directory "   ______________________________________________________________________    Interval History   Patient is a pleasant 63-year-old gentleman who presented with lower extremity cellulitis and acute respiratory failure.  Patient's breathing is improved.  DVT manage  Right and with pain.  Lactic has improved.  Potassium still very high.  Treating.  Patient has no chest pain.  No fever.  No nausea or vomiting.    Physical Exam   Vital Signs: Temp: 97.6  F (36.4  C) Temp src: Tympanic BP: 134/64 Pulse: 78   Resp: 18 SpO2: 93 % O2 Device: None (Room air)    Weight: 280 lbs 10.33 oz    General: Patient laying in bed in no acute distress  Respiratory: Diminished air entry in both lower zones with faint crackles  Cardiovascular: Regular rate and rhythm with first and second    GI: Abdomen is soft with positive bowel sounds  Integumentary: Right lower extremity erythema and anterior shin    Medical Decision Making       50  MINUTES SPENT BY ME on the date of service doing chart review, history, exam, documentation & further activities per the note.      Data

## 2024-07-06 NOTE — PLAN OF CARE
Goal Outcome Evaluation:  MD was text paged to update him on the lab results.

## 2024-07-06 NOTE — PLAN OF CARE
Goal Outcome Evaluation:      Plan of Care Reviewed With: patient    Overall Patient Progress: improvingOverall Patient Progress: improving    Significant events last 24 hours: Hyperglycemia; Dr. Ken notified: lantus started, sliding scale insulin increased; additional 10units Novolog given at HS; telemetry started for hyperkalemia; EKG completed; critical vancomycin level and worsening renal function this AM-Dr. Howard updated     Neuro: WNL; A/O x4; numbness in hands r/t hyperglycemia per patient  CV: telemetry monitoring initiated this shift d/t hyperkalemia-SR on telemetry with no significant events overnight; EKG completed this shift; BP soft-within parameters  Resp: sats stable on RA; lungs clear/dim; occasional non-productive cough; denies SOB; noticeable RICH  GI: Incontinent BM x1 this shift; diarrhea on previous shifts; intermittent nausea with hyperglycemia  : WNL; voids independently without difficulty; AM bladder scan 0mL  Skin: RLE cellulitis: redness to leg below knee with scattered blisters; scattered scabs on BLE  Lines/Drains: PIV to right AC infusing NS@100mL/hr  Mobility: Up in room with SBA, FWW; turned self in bed; remained in bed overnight     Safety:   activity supervised, clutter free environment maintained, increased rounding and observation, nonskid shoes/slippers when out of bed, patient and family education, room near nurse's station, room organization consistent, safety round/check completed, supervised activity; call light within reach; hourly rounding throughout the night.      Pain: denies pain this shift     Individualized Care Needs: able to check own BG with dexcom; concerned about hyperglycemia; muscle twitching r/t hyperkalemia-MD notified, labs checked, EKG completed, tele started    Expected Discharge Disposition: Home in [place] alone; endorses multiple friends/family members available for help    Report Given: EZEQUIEL Kamara    /58 (BP Location: Left arm, Patient  "Position: Semi-Austin's, Cuff Size: Adult Regular)   Pulse 84   Temp 98.2  F (36.8  C) (Tympanic)   Resp 18   Ht 1.778 m (5' 10\")   Wt 124.8 kg (275 lb 2.2 oz)   SpO2 94%   BMI 39.48 kg/m      "

## 2024-07-06 NOTE — PLAN OF CARE
Pt A&O x 4. Afebrile,  O2 sats low 90's on room air. Lung are diminished, and Pt denies pain.  mL/hr.     On regular diet and tolerating well.  Pre-meal  sliding scale novolog, providing notified.      BLE red/katie, blisters intact, boarders marked and remaining in borders.       Face to face report given with opportunity to observe patient.    Report given to Elif Roland RN   7/5/2024  7:36 PM

## 2024-07-07 LAB
ANION GAP SERPL CALCULATED.3IONS-SCNC: 13 MMOL/L (ref 7–15)
ANION GAP SERPL CALCULATED.3IONS-SCNC: 14 MMOL/L (ref 7–15)
BUN SERPL-MCNC: 60.8 MG/DL (ref 8–23)
BUN SERPL-MCNC: 69.6 MG/DL (ref 8–23)
CALCIUM SERPL-MCNC: 8 MG/DL (ref 8.8–10.2)
CALCIUM SERPL-MCNC: 8.2 MG/DL (ref 8.8–10.2)
CHLORIDE SERPL-SCNC: 107 MMOL/L (ref 98–107)
CHLORIDE SERPL-SCNC: 108 MMOL/L (ref 98–107)
CREAT SERPL-MCNC: 2.84 MG/DL (ref 0.67–1.17)
CREAT SERPL-MCNC: 3.64 MG/DL (ref 0.67–1.17)
DEPRECATED HCO3 PLAS-SCNC: 16 MMOL/L (ref 22–29)
DEPRECATED HCO3 PLAS-SCNC: 17 MMOL/L (ref 22–29)
EGFRCR SERPLBLD CKD-EPI 2021: 18 ML/MIN/1.73M2
EGFRCR SERPLBLD CKD-EPI 2021: 24 ML/MIN/1.73M2
GLUCOSE BLDC GLUCOMTR-MCNC: 165 MG/DL (ref 70–99)
GLUCOSE BLDC GLUCOMTR-MCNC: 255 MG/DL (ref 70–99)
GLUCOSE BLDC GLUCOMTR-MCNC: 294 MG/DL (ref 70–99)
GLUCOSE BLDC GLUCOMTR-MCNC: 316 MG/DL (ref 70–99)
GLUCOSE SERPL-MCNC: 229 MG/DL (ref 70–99)
GLUCOSE SERPL-MCNC: 320 MG/DL (ref 70–99)
PLATELET # BLD AUTO: 193 10E3/UL (ref 150–450)
POTASSIUM SERPL-SCNC: 4.3 MMOL/L (ref 3.4–5.3)
POTASSIUM SERPL-SCNC: 4.5 MMOL/L (ref 3.4–5.3)
SODIUM SERPL-SCNC: 137 MMOL/L (ref 135–145)
SODIUM SERPL-SCNC: 138 MMOL/L (ref 135–145)

## 2024-07-07 PROCEDURE — 250N000013 HC RX MED GY IP 250 OP 250 PS 637: Performed by: INTERNAL MEDICINE

## 2024-07-07 PROCEDURE — 36415 COLL VENOUS BLD VENIPUNCTURE: CPT | Performed by: INTERNAL MEDICINE

## 2024-07-07 PROCEDURE — 36415 COLL VENOUS BLD VENIPUNCTURE: CPT | Performed by: HOSPITALIST

## 2024-07-07 PROCEDURE — 99233 SBSQ HOSP IP/OBS HIGH 50: CPT | Performed by: INTERNAL MEDICINE

## 2024-07-07 PROCEDURE — 80048 BASIC METABOLIC PNL TOTAL CA: CPT | Performed by: INTERNAL MEDICINE

## 2024-07-07 PROCEDURE — 258N000002 HC RX IP 258 OP 250: Performed by: INTERNAL MEDICINE

## 2024-07-07 PROCEDURE — 250N000009 HC RX 250: Performed by: INTERNAL MEDICINE

## 2024-07-07 PROCEDURE — 250N000011 HC RX IP 250 OP 636: Performed by: INTERNAL MEDICINE

## 2024-07-07 PROCEDURE — 80048 BASIC METABOLIC PNL TOTAL CA: CPT | Performed by: HOSPITALIST

## 2024-07-07 PROCEDURE — 250N000011 HC RX IP 250 OP 636: Performed by: HOSPITALIST

## 2024-07-07 PROCEDURE — 120N000001 HC R&B MED SURG/OB

## 2024-07-07 PROCEDURE — 85049 AUTOMATED PLATELET COUNT: CPT | Performed by: INTERNAL MEDICINE

## 2024-07-07 RX ORDER — HEPARIN SODIUM 5000 [USP'U]/.5ML
5000 INJECTION, SOLUTION INTRAVENOUS; SUBCUTANEOUS EVERY 12 HOURS
Status: DISCONTINUED | OUTPATIENT
Start: 2024-07-07 | End: 2024-07-09 | Stop reason: HOSPADM

## 2024-07-07 RX ORDER — HYDRALAZINE HYDROCHLORIDE 20 MG/ML
10 INJECTION INTRAMUSCULAR; INTRAVENOUS EVERY 6 HOURS PRN
Status: DISCONTINUED | OUTPATIENT
Start: 2024-07-07 | End: 2024-07-09 | Stop reason: HOSPADM

## 2024-07-07 RX ADMIN — HYDRALAZINE HYDROCHLORIDE 10 MG: 20 INJECTION INTRAMUSCULAR; INTRAVENOUS at 16:44

## 2024-07-07 RX ADMIN — SODIUM BICARBONATE: 84 INJECTION, SOLUTION INTRAVENOUS at 15:01

## 2024-07-07 RX ADMIN — METOPROLOL TARTRATE 50 MG: 25 TABLET, FILM COATED ORAL at 21:42

## 2024-07-07 RX ADMIN — CEFEPIME 2 G: 2 INJECTION, POWDER, FOR SOLUTION INTRAVENOUS at 06:38

## 2024-07-07 RX ADMIN — DORZOLAMIDE 1 DROP: 20 SOLUTION/ DROPS OPHTHALMIC at 09:35

## 2024-07-07 RX ADMIN — ROSUVASTATIN 10 MG: 10 TABLET, FILM COATED ORAL at 21:42

## 2024-07-07 RX ADMIN — BRIMONIDINE TARTRATE 1 DROP: 2 SOLUTION OPHTHALMIC at 09:37

## 2024-07-07 RX ADMIN — SODIUM BICARBONATE: 84 INJECTION, SOLUTION INTRAVENOUS at 01:15

## 2024-07-07 RX ADMIN — METOPROLOL TARTRATE 50 MG: 25 TABLET, FILM COATED ORAL at 09:36

## 2024-07-07 RX ADMIN — LEVOTHYROXINE SODIUM 175 MCG: 0.1 TABLET ORAL at 06:38

## 2024-07-07 RX ADMIN — DORZOLAMIDE 1 DROP: 20 SOLUTION/ DROPS OPHTHALMIC at 21:42

## 2024-07-07 RX ADMIN — BRIMONIDINE TARTRATE 1 DROP: 2 SOLUTION OPHTHALMIC at 21:48

## 2024-07-07 RX ADMIN — SODIUM ZIRCONIUM CYCLOSILICATE 10 G: 5 POWDER, FOR SUSPENSION ORAL at 07:46

## 2024-07-07 RX ADMIN — HEPARIN SODIUM 5000 UNITS: 5000 INJECTION, SOLUTION INTRAVENOUS; SUBCUTANEOUS at 21:42

## 2024-07-07 ASSESSMENT — ACTIVITIES OF DAILY LIVING (ADL)
ADLS_ACUITY_SCORE: 37
ADLS_ACUITY_SCORE: 36
ADLS_ACUITY_SCORE: 37
ADLS_ACUITY_SCORE: 36
ADLS_ACUITY_SCORE: 37
ADLS_ACUITY_SCORE: 37
ADLS_ACUITY_SCORE: 36
ADLS_ACUITY_SCORE: 37
ADLS_ACUITY_SCORE: 36
ADLS_ACUITY_SCORE: 37
ADLS_ACUITY_SCORE: 36
ADLS_ACUITY_SCORE: 37

## 2024-07-07 NOTE — PLAN OF CARE
Goal Outcome Evaluation:      Plan of Care Reviewed With: patient    Overall Patient Progress: improvingOverall Patient Progress: improving    Significant events last 24 hours: Hyperglycemia under control; plan to switch to insulin pump in AM; incontinent BM; bicarb infusion for worsening renal function; monitoring labs; escalera inserted 7/6     Neuro: WNL; A/O x4; numbness in hands r/t hyperglycemia per patient  CV: SR on telemetry with no significant events overnight; BP within parameters  Resp: sats stable on RA; lungs clear/dim; occasional non-productive cough; denies SOB; noticeable RICH  GI: Incontinent BM x1 this shift; diarrhea on previous shifts; intermittent nausea with hyperglycemia  : Escalera catheter in place with adequate output  Skin: RLE cellulitis: redness to leg below knee with scattered blisters; scattered scabs on BLE  Lines/Drains: PIV to right AC infusing bicarb.45NS@100mL/hr  Mobility: Up in room with SBA, FWW; turned self in bed; remained in bed overnight     Safety:   activity supervised, clutter free environment maintained, increased rounding and observation, nonskid shoes/slippers when out of bed, patient and family education, room near nurse's station, room organization consistent, safety round/check completed, supervised activity; call light within reach; hourly rounding throughout the night.      Pain: denies pain this shift     Individualized Care Needs: prefers male caregivers when able; scattered scabs to BLE     Expected Discharge Disposition: Home in [place] alone; endorses multiple friends/family members available for help     Report Given: EZEQUIEL Kamara

## 2024-07-07 NOTE — PLAN OF CARE
Pt /90 R arm, 199/80 L arm, manual 200/102. C/O dizzy and not feeling up to par. Reports takes Hydrochlorothiazide at home. Dr FRANCHESCA cantu

## 2024-07-07 NOTE — PLAN OF CARE
Goal Outcome Evaluation:per pharmacy, they did speak to the MD regarding the insulin pump, it was decided not to use the patients own supply and to keep going with what is ordered.

## 2024-07-07 NOTE — PLAN OF CARE
Goal Outcome Evaluation:MD has been updated that patient would like to use his own insulin pump as he does at home, pharmacy has also been updated, we are coming up with a plan.

## 2024-07-07 NOTE — PLAN OF CARE
Goal Outcome Evaluation:  Face to face report given with opportunity to observe patient.    Report given to Kelly Gentile RN   7/7/2024  4:03 PM

## 2024-07-07 NOTE — PROGRESS NOTES
"[facility]    Medicine Progress Note - Hospitalist Service    Date of Admission:  7/4/2024    Assessment & Plan   Cellulitis and soft tissue infection  Resolving  Continue antibiotics  Cultures 1 of 2 bottles growing coagulase-negative negative staph     right-sided pneumonia  -Resolving  -continue antibiotics    Metabolic acidosis  Mild improvement today   Continue bicarb drip    Diabetes mellitus on insulin pump at home  Blood sugar has been difficult to control  Lantus and high-dose sliding scale  Blood sugar control    Hypertension.   Blood pressure control    Acute kidney injury on top of CKD  Some improvement today  CT of the abdomen and pelvis without contrast reassuring  Kimball catheter to monitor I's and O's  Bicarb drip  Monitor kidney function  Hold vancomycin and avoid nephrotoxic agents      Hyperkalemia.   Lokelma x 1 today    Hyponatremia  Will continue IV fluids              Diet: Consistent Carbohydrate Diet Moderate Consistent Carb (60 g CHO per Meal) Diet    DVT Prophylaxis: Enoxaparin (Lovenox) SQ  Kimball Catheter: PRESENT, indication: Acute retention or obstruction  Lines: None     Cardiac Monitoring: ACTIVE order. Indication: hyperkalemia  Code Status: Full Code      Clinically Significant Risk Factors        # Hyperkalemia: Highest K = 5.6 mmol/L in last 2 days, will monitor as appropriate            # Acute Kidney Injury, unspecified: based on a >150% or 0.3 mg/dL increase in last creatinine compared to past 90 day average, will monitor renal function  # Hypertension: Noted on problem list                # Severe Obesity: Estimated body mass index is 40.27 kg/m  as calculated from the following:    Height as of this encounter: 1.778 m (5' 10\").    Weight as of this encounter: 127.3 kg (280 lb 10.3 oz)., PRESENT ON ADMISSION            Disposition Plan     Medically Ready for Discharge:              Bruce Howard MD  Hospitalist Service  [facility]  Securely message " with [employer] (more info)  Text page via [facility] Paging/Directory   ______________________________________________________________________    Interval History   Patient is a pleasant 63-year-old gentleman who presented with lower extremity cellulitis and acute respiratory failure.  Patient's breathing is improved.  DVT manage  Right and with pain.  Lactic has improved.  Potassium still very high.  Treating.  Patient has no chest pain.  No fever.  No nausea or vomiting.    Physical Exam   Vital Signs: Temp: 98.1  F (36.7  C) Temp src: Tympanic BP: 137/64 Pulse: 82   Resp: 18 SpO2: 96 % O2 Device: None (Room air)    Weight: 280 lbs 10.33 oz    General: Patient laying in bed in no acute distress  Respiratory: Diminished air entry in both lower zones with faint crackles  Cardiovascular: Regular rate and rhythm with first and second    GI: Abdomen is soft with positive bowel sounds  Integumentary: Right lower extremity erythema and anterior shin    Medical Decision Making       50  MINUTES SPENT BY ME on the date of service doing chart review, history, exam, documentation & further activities per the note.      Data

## 2024-07-07 NOTE — PROGRESS NOTES
Cross cover  RN called BP elevated  Ordered RN hydralazine prn    Upon review of meds  Stop lisinopril as patient had hyperkalemia and FRANCES on CKD here and received Lokelma  today   Stop Lovenox as GFR is low, star heparin subcutaneous for DVT prophylaxis  BMP q 6 hrs as on Bicarb Drip and likely can come off on next repeat of Labs  Monitor Potassium as bicarb will cause hypokalemia  If needs BP control may utilize nitroglycerin drip in acute setting  Monitor I's and O's  Dr Traylor to follow up BMP that's order and adjust plan as necessary,.  Discussed with pharmacy

## 2024-07-07 NOTE — PHARMACY
Pharmacy Antimicrobial Stewardship Assessment     Current Antimicrobial Therapy:  Anti-infectives (From now, onward)      Start     Dose/Rate Route Frequency Ordered Stop    24 0730  ceFEPIme (MAXIPIME) 2 g vial to attach to  mL bag for ADULTS or 50 mL bag for PEDS         2 g  over 30 Minutes Intravenous EVERY 24 HOURS 24 1110              Indication: SSTI, pneumonia    Days of Therapy: 4         Pertinent Labs:    Recent Labs   Lab Test 24  1746 24  0455 24  1139   WBC 10.4 15.6* 7.2       Recent Labs   Lab Test 24  1922 24  1400 24  1139   LACT 1.3 2.2* 3.1*   PCAL  --   --  0.08    < > = values in this interval not displayed.        Temperature:  Temp (24hrs), Av.3  F (36.8  C), Min:98.1  F (36.7  C), Max:98.6  F (37  C)      Culture Results:   30-Day Micro Results       Collected Updated Procedure Result Status      2024 1800 2024 0715 Urine Culture [[id number]]   Urine, Kimball Catheter    Preliminary result Component Value   Culture Culture in progress  [P]                2024 1723 2024 MRSA MSSA PCR, Nasal Swab [[id number]]    Swab from Nares, Bilateral    Final result Component Value   MRSA Target DNA Negative   SA Target DNA Positive            2024 1249 2024 0617 Blood Culture Arm, Left [[id number]]   Blood from Arm, Left    Preliminary result Component Value   Culture No growth after 2 days  [P]                2024 1211 2024 0617 Blood Culture Arm, Left [[id number]]   Blood from Arm, Left    Preliminary result Component Value   Culture No growth after 2 days  [P]                2024 1134 2024 1216 Symptomatic Influenza A/B, RSV, & SARS-CoV2 PCR (COVID-19) Nose [[id number]]    Swab from Nose    Final result Component Value   Influenza A PCR Negative   Influenza B PCR Negative   RSV PCR Negative   SARS CoV2 PCR Negative   NEGATIVE: SARS-CoV-2 (COVID-19) RNA not detected,  presumed negative.                     Recommendations/Interventions:  1. Afebrile, cultures with no growth to date. Consider transitioning to oral antibiotics when clinically appropriate.     Alva Martinez RPH  July 7, 2024

## 2024-07-07 NOTE — PLAN OF CARE
Goal Outcome Evaluation:upon general assessment, patient alert oriented and adequately makes his needs known, patient has had an improved urine output, there does not appear to be any tremors to this point, we have had discussions regarding the patients home insulin verses the ordered insulin, it has been decided we will use the ordered insulin and patient can resume his home insulin at discharge, accu check was 165, appetite is fair, and lantus as well as novolog were administered, patient was able to ambulate from the bed to the chair with stand by assist of this writer, and he tolerated well, IV did infiltrate earlier this morning, and we were able to replace an IV, patient tolerated well, patient adequately makes his needs known, vitals as charted.

## 2024-07-07 NOTE — PLAN OF CARE
BP recheck <180 systolic, states is feeling better. RLE cellulitis within marked boarders, warmth equal to LLE, denies any pain/discomfort

## 2024-07-08 ENCOUNTER — APPOINTMENT (OUTPATIENT)
Dept: OCCUPATIONAL THERAPY | Facility: HOSPITAL | Age: 64
DRG: 602 | End: 2024-07-08
Attending: INTERNAL MEDICINE
Payer: MEDICARE

## 2024-07-08 ENCOUNTER — APPOINTMENT (OUTPATIENT)
Dept: PHYSICAL THERAPY | Facility: HOSPITAL | Age: 64
DRG: 602 | End: 2024-07-08
Attending: INTERNAL MEDICINE
Payer: MEDICARE

## 2024-07-08 ENCOUNTER — APPOINTMENT (OUTPATIENT)
Dept: CARDIOLOGY | Facility: HOSPITAL | Age: 64
DRG: 602 | End: 2024-07-08
Attending: INTERNAL MEDICINE
Payer: MEDICARE

## 2024-07-08 LAB
ANION GAP SERPL CALCULATED.3IONS-SCNC: 11 MMOL/L (ref 7–15)
ANION GAP SERPL CALCULATED.3IONS-SCNC: 13 MMOL/L (ref 7–15)
ANION GAP SERPL CALCULATED.3IONS-SCNC: 14 MMOL/L (ref 7–15)
ATRIAL RATE - MUSE: 75 BPM
BASOPHILS # BLD AUTO: 0.1 10E3/UL (ref 0–0.2)
BASOPHILS NFR BLD AUTO: 1 %
BUN SERPL-MCNC: 45.8 MG/DL (ref 8–23)
BUN SERPL-MCNC: 50.4 MG/DL (ref 8–23)
BUN SERPL-MCNC: 55 MG/DL (ref 8–23)
CALCIUM SERPL-MCNC: 8 MG/DL (ref 8.8–10.2)
CALCIUM SERPL-MCNC: 8 MG/DL (ref 8.8–10.2)
CALCIUM SERPL-MCNC: 8.3 MG/DL (ref 8.8–10.2)
CHLORIDE SERPL-SCNC: 108 MMOL/L (ref 98–107)
CHLORIDE SERPL-SCNC: 109 MMOL/L (ref 98–107)
CHLORIDE SERPL-SCNC: 109 MMOL/L (ref 98–107)
CREAT SERPL-MCNC: 2.23 MG/DL (ref 0.67–1.17)
CREAT SERPL-MCNC: 2.39 MG/DL (ref 0.67–1.17)
CREAT SERPL-MCNC: 2.55 MG/DL (ref 0.67–1.17)
DEPRECATED HCO3 PLAS-SCNC: 16 MMOL/L (ref 22–29)
DEPRECATED HCO3 PLAS-SCNC: 19 MMOL/L (ref 22–29)
DEPRECATED HCO3 PLAS-SCNC: 20 MMOL/L (ref 22–29)
DIASTOLIC BLOOD PRESSURE - MUSE: NORMAL MMHG
EGFRCR SERPLBLD CKD-EPI 2021: 28 ML/MIN/1.73M2
EGFRCR SERPLBLD CKD-EPI 2021: 30 ML/MIN/1.73M2
EGFRCR SERPLBLD CKD-EPI 2021: 33 ML/MIN/1.73M2
EOSINOPHIL # BLD AUTO: 0.2 10E3/UL (ref 0–0.7)
EOSINOPHIL NFR BLD AUTO: 3 %
ERYTHROCYTE [DISTWIDTH] IN BLOOD BY AUTOMATED COUNT: 13.2 % (ref 10–15)
GLUCOSE BLDC GLUCOMTR-MCNC: 200 MG/DL (ref 70–99)
GLUCOSE BLDC GLUCOMTR-MCNC: 211 MG/DL (ref 70–99)
GLUCOSE BLDC GLUCOMTR-MCNC: 257 MG/DL (ref 70–99)
GLUCOSE BLDC GLUCOMTR-MCNC: 265 MG/DL (ref 70–99)
GLUCOSE BLDC GLUCOMTR-MCNC: 288 MG/DL (ref 70–99)
GLUCOSE SERPL-MCNC: 251 MG/DL (ref 70–99)
GLUCOSE SERPL-MCNC: 272 MG/DL (ref 70–99)
GLUCOSE SERPL-MCNC: 356 MG/DL (ref 70–99)
HCT VFR BLD AUTO: 36.3 % (ref 40–53)
HGB BLD-MCNC: 11.9 G/DL (ref 13.3–17.7)
IMM GRANULOCYTES # BLD: 0.1 10E3/UL
IMM GRANULOCYTES NFR BLD: 1 %
INTERPRETATION ECG - MUSE: NORMAL
LVEF ECHO: NORMAL
LYMPHOCYTES # BLD AUTO: 0.8 10E3/UL (ref 0.8–5.3)
LYMPHOCYTES NFR BLD AUTO: 11 %
MCH RBC QN AUTO: 30.8 PG (ref 26.5–33)
MCHC RBC AUTO-ENTMCNC: 32.8 G/DL (ref 31.5–36.5)
MCV RBC AUTO: 94 FL (ref 78–100)
MONOCYTES # BLD AUTO: 0.8 10E3/UL (ref 0–1.3)
MONOCYTES NFR BLD AUTO: 11 %
NEUTROPHILS # BLD AUTO: 5 10E3/UL (ref 1.6–8.3)
NEUTROPHILS NFR BLD AUTO: 73 %
NRBC # BLD AUTO: 0 10E3/UL
NRBC BLD AUTO-RTO: 0 /100
P AXIS - MUSE: 56 DEGREES
PLATELET # BLD AUTO: 173 10E3/UL (ref 150–450)
POTASSIUM SERPL-SCNC: 4.1 MMOL/L (ref 3.4–5.3)
POTASSIUM SERPL-SCNC: 4.1 MMOL/L (ref 3.4–5.3)
POTASSIUM SERPL-SCNC: 4.6 MMOL/L (ref 3.4–5.3)
PR INTERVAL - MUSE: 158 MS
QRS DURATION - MUSE: 94 MS
QT - MUSE: 388 MS
QTC - MUSE: 433 MS
R AXIS - MUSE: -12 DEGREES
RBC # BLD AUTO: 3.86 10E6/UL (ref 4.4–5.9)
SODIUM SERPL-SCNC: 139 MMOL/L (ref 135–145)
SODIUM SERPL-SCNC: 140 MMOL/L (ref 135–145)
SODIUM SERPL-SCNC: 140 MMOL/L (ref 135–145)
SYSTOLIC BLOOD PRESSURE - MUSE: NORMAL MMHG
T AXIS - MUSE: 53 DEGREES
VENTRICULAR RATE- MUSE: 75 BPM
WBC # BLD AUTO: 6.9 10E3/UL (ref 4–11)

## 2024-07-08 PROCEDURE — 80048 BASIC METABOLIC PNL TOTAL CA: CPT | Performed by: HOSPITALIST

## 2024-07-08 PROCEDURE — 97165 OT EVAL LOW COMPLEX 30 MIN: CPT | Mod: GO

## 2024-07-08 PROCEDURE — 97530 THERAPEUTIC ACTIVITIES: CPT | Mod: GP

## 2024-07-08 PROCEDURE — 85025 COMPLETE CBC W/AUTO DIFF WBC: CPT | Performed by: INTERNAL MEDICINE

## 2024-07-08 PROCEDURE — 36415 COLL VENOUS BLD VENIPUNCTURE: CPT | Performed by: INTERNAL MEDICINE

## 2024-07-08 PROCEDURE — 250N000011 HC RX IP 250 OP 636: Performed by: INTERNAL MEDICINE

## 2024-07-08 PROCEDURE — 120N000001 HC R&B MED SURG/OB

## 2024-07-08 PROCEDURE — 97161 PT EVAL LOW COMPLEX 20 MIN: CPT | Mod: GP

## 2024-07-08 PROCEDURE — 36415 COLL VENOUS BLD VENIPUNCTURE: CPT | Performed by: HOSPITALIST

## 2024-07-08 PROCEDURE — 999N000208 ECHOCARDIOGRAM COMPLETE

## 2024-07-08 PROCEDURE — 97530 THERAPEUTIC ACTIVITIES: CPT | Mod: GO

## 2024-07-08 PROCEDURE — 99232 SBSQ HOSP IP/OBS MODERATE 35: CPT | Performed by: INTERNAL MEDICINE

## 2024-07-08 PROCEDURE — 250N000009 HC RX 250: Performed by: INTERNAL MEDICINE

## 2024-07-08 PROCEDURE — 250N000013 HC RX MED GY IP 250 OP 250 PS 637: Performed by: INTERNAL MEDICINE

## 2024-07-08 PROCEDURE — C8929 TTE W OR WO FOL WCON,DOPPLER: HCPCS

## 2024-07-08 PROCEDURE — 93306 TTE W/DOPPLER COMPLETE: CPT | Mod: 26 | Performed by: INTERNAL MEDICINE

## 2024-07-08 PROCEDURE — 250N000011 HC RX IP 250 OP 636: Performed by: HOSPITALIST

## 2024-07-08 PROCEDURE — 258N000002 HC RX IP 258 OP 250: Performed by: INTERNAL MEDICINE

## 2024-07-08 PROCEDURE — 255N000002 HC RX 255 OP 636: Performed by: INTERNAL MEDICINE

## 2024-07-08 RX ADMIN — PERFLUTREN 2 ML: 6.52 INJECTION, SUSPENSION INTRAVENOUS at 11:15

## 2024-07-08 RX ADMIN — SODIUM BICARBONATE: 84 INJECTION, SOLUTION INTRAVENOUS at 00:21

## 2024-07-08 RX ADMIN — HEPARIN SODIUM 5000 UNITS: 5000 INJECTION, SOLUTION INTRAVENOUS; SUBCUTANEOUS at 08:22

## 2024-07-08 RX ADMIN — HEPARIN SODIUM 5000 UNITS: 5000 INJECTION, SOLUTION INTRAVENOUS; SUBCUTANEOUS at 20:26

## 2024-07-08 RX ADMIN — LEVOTHYROXINE SODIUM 175 MCG: 0.1 TABLET ORAL at 05:45

## 2024-07-08 RX ADMIN — DORZOLAMIDE 1 DROP: 20 SOLUTION/ DROPS OPHTHALMIC at 08:21

## 2024-07-08 RX ADMIN — HYDRALAZINE HYDROCHLORIDE 10 MG: 20 INJECTION INTRAMUSCULAR; INTRAVENOUS at 09:53

## 2024-07-08 RX ADMIN — ROSUVASTATIN 10 MG: 10 TABLET, FILM COATED ORAL at 20:26

## 2024-07-08 RX ADMIN — DORZOLAMIDE 1 DROP: 20 SOLUTION/ DROPS OPHTHALMIC at 20:30

## 2024-07-08 RX ADMIN — BRIMONIDINE TARTRATE 1 DROP: 2 SOLUTION OPHTHALMIC at 08:24

## 2024-07-08 RX ADMIN — CEFEPIME 2 G: 2 INJECTION, POWDER, FOR SOLUTION INTRAVENOUS at 07:59

## 2024-07-08 RX ADMIN — METOPROLOL TARTRATE 50 MG: 25 TABLET, FILM COATED ORAL at 20:26

## 2024-07-08 RX ADMIN — METOPROLOL TARTRATE 50 MG: 25 TABLET, FILM COATED ORAL at 08:19

## 2024-07-08 RX ADMIN — BRIMONIDINE TARTRATE 1 DROP: 2 SOLUTION OPHTHALMIC at 20:43

## 2024-07-08 ASSESSMENT — ACTIVITIES OF DAILY LIVING (ADL)
ADLS_ACUITY_SCORE: 37
ADLS_ACUITY_SCORE: 39
ADLS_ACUITY_SCORE: 37
ADLS_ACUITY_SCORE: 35
ADLS_ACUITY_SCORE: 37
ADLS_ACUITY_SCORE: 37
ADLS_ACUITY_SCORE: 39
ADLS_ACUITY_SCORE: 35
PREVIOUS_RESPONSIBILITIES: MEAL PREP;HOUSEKEEPING;LAUNDRY;MEDICATION MANAGEMENT;FINANCES;DRIVING
ADLS_ACUITY_SCORE: 35
ADLS_ACUITY_SCORE: 37
ADLS_ACUITY_SCORE: 35
ADLS_ACUITY_SCORE: 37
ADLS_ACUITY_SCORE: 35
ADLS_ACUITY_SCORE: 37
ADLS_ACUITY_SCORE: 39
ADLS_ACUITY_SCORE: 39
ADLS_ACUITY_SCORE: 35
ADLS_ACUITY_SCORE: 37
ADLS_ACUITY_SCORE: 35

## 2024-07-08 NOTE — PLAN OF CARE
"Goal Outcome Evaluation:      Plan of Care Reviewed With: patient    Overall Patient Progress: improvingOverall Patient Progress: improving    Significant events last 24 hours: Hyperglycemia under control; bicarb infusion for renal function-q6 BMPs; renal function improving; escalera inserted 7/6     Neuro: WNL; A/O x4  CV: SR on telemetry with no significant events overnight; BP within parameters  Resp: sats stable on RA; lungs clear/dim; occasional non-productive cough; denies SOB; noticeable RICH  GI: No BM this shift; diarrhea on previous shifts  : Escalera catheter in place with adequate output: 1925mL this shift  Skin: RLE cellulitis: redness to leg below knee with scattered blisters; scattered scabs on BLE; redness within previously marked margins  Lines/Drains: PIV to right AC infusing bicarb.45NS@100mL/hr  Mobility: Up in room with SBA, FWW; turned self in bed; remained in bed overnight     Safety:   activity supervised, clutter free environment maintained, increased rounding and observation, nonskid shoes/slippers when out of bed, patient and family education, room near nurse's station, room organization consistent, safety round/check completed, supervised activity; call light within reach; hourly rounding throughout the night.      Pain: denies pain this shift     Individualized Care Needs: prefers male caregivers when able; scattered scabs to BLE; anxious about BP and BG     Expected Discharge Disposition: Home in [place] alone; endorses multiple friends/family members available for help     Report Given: EZEQUIEL Kamara    /72 (BP Location: Left arm, Patient Position: Semi-Austin's, Cuff Size: Adult Large)   Pulse 81   Temp 98.9  F (37.2  C) (Tympanic)   Resp 20   Ht 1.778 m (5' 10\")   Wt 127.3 kg (280 lb 10.3 oz)   SpO2 96%   BMI 40.27 kg/m     "

## 2024-07-08 NOTE — PLAN OF CARE
Goal Outcome Evaluation:upon general assessment, patient was hypertensive and medicated accordingly, MD aware, the escalera catheter was removed this morning,  patient has urinated twice since then. In the toilet and we were not able to document an accurate output due to this, patient has worked with PT and OT, did tolerate well, patient has denied pain today, patient denies nausea and pain, the right leg cellulitis is within the line of demarcation, and there is an intact blister, patient adequately makes his needs known, vitals as charted, accu checks as charted.     Face to face report given with opportunity to observe patient.    Report given to Romana Gentile RN   7/8/2024  4:10 PM

## 2024-07-08 NOTE — PROGRESS NOTES
"[facility]    Medicine Progress Note - Hospitalist Service    Date of Admission:  7/4/2024    Assessment & Plan     Cellulitis and soft tissue infection right lower extremity: With some blistering, erythema contained within previously marked borders.  Continuing empiric antibiotics with cefepime  Cultures 1 of 2 bottles growing coagulase-negative negative staph     Right-sided pneumonia  -Resolving, no respiratory failure  -continue antibiotics    Metabolic acidosis: Likely due to renal failure.  Improving.  Mild improvement today   Discontinue bicarb drip    Diabetes mellitus on insulin pump at home  Blood sugar has been difficult to control  Continue Lantus and high-dose sliding scale  Switch back to insulin pump prior to discharge    Hypertension.   Blood pressure control    Acute kidney injury on top of CKD  Improving  CT of the abdomen and pelvis without hydronephrosis  Discontinue Kimball catheter  Discontinue bicarb drip  Monitor renal function and urine output  Avoid nephrotoxic agents    Hyperkalemia.   Resolved after Lokelma    Hyponatremia  Resolved          Diet: Consistent Carbohydrate Diet Moderate Consistent Carb (60 g CHO per Meal) Diet    DVT Prophylaxis: Enoxaparin (Lovenox) SQ  Kimball Catheter: Not present  Lines: None     Cardiac Monitoring: ACTIVE order. Indication: Acute kidney injury  Code Status: Full Code      Clinically Significant Risk Factors        # Hyperkalemia: Highest K = 5.6 mmol/L in last 2 days, will monitor as appropriate             # Hypertension: Noted on problem list             #Precipitous drop in Hgb/Hct: Lowest Hgb this hospitalization: 11.9 g/dL. Will continue to monitor and treat/transfuse as appropriate.     # Severe Obesity: Estimated body mass index is 40.27 kg/m  as calculated from the following:    Height as of this encounter: 1.778 m (5' 10\").    Weight as of this encounter: 127.3 kg (280 lb 10.3 oz)., PRESENT ON ADMISSION            Disposition Plan "         Reji Schneider MD, MD  Hospitalist Service  [facility]  Securely message with [employer] (more info)  Text page via [place] Paging/Directory   ______________________________________________________________________    Interval History   Patient seen in room.  Patient very pleasant, reports overall improvement.  Urine output much improved.  Still feels tired/weak.  No Kimball today, PT OT.    Physical Exam   Vital Signs: Temp: 98.8  F (37.1  C) Temp src: Tympanic BP: (!) 198/80 Pulse: 80   Resp: 18 SpO2: 92 % O2 Device: None (Room air)    Weight: 280 lbs 10.33 oz    General: Patient laying in bed in no acute distress  Respiratory: Diminished air entry in both lower zones, mild wheezing right lower lobe  Cardiovascular: Regular rate and rhythm with first and second  GI: Abdomen is soft with positive bowel sounds  Integumentary: Right lower extremity erythema and anterior shin with blistering      Medical Decision Making         Data

## 2024-07-08 NOTE — PROGRESS NOTES
"   07/08/24 1200   Appointment Info   Signing Clinician's Name / Credentials (PT) Russ Lombardi DPT   Rehab Comments (PT) Pt resting in bed upon approach and very agreeable to work with PT.  Ready to be getting out of bed.   Living Environment   People in Home alone   Current Living Arrangements house   Home Accessibility stairs within home  (Ramp to enter home)   Number of Stairs, Within Home, Primary greater than 10 stairs   Stair Railings, Within Home, Primary railing on right side (ascending)   Transportation Anticipated car, drives self   Living Environment Comments Patient was living alone in a house with a ramp to enter. Bedroom and bathroom are on the main level and laundry and ninoska litter/ food are in the basement. Bathroom has a tub/shower combo without grab bars or bench. He reported that he utilizes his wall and sink to transfer on/off toilet as needed. He has a daughter and neighbors who are very supportive and offer assistance as needed.  Sister also helps with cleaning regularly.   Self-Care   Usual Activity Tolerance good   Current Activity Tolerance moderate   Equipment Currently Used at Home other (see comments)  (insulin pump; railing with stairs; wheelchair ramp)   Fall history within last six months yes   Number of times patient has fallen within last six months 2  (Pt stated he also \"bangs into things\" a lot d/t his LE neuropathy)   Activity/Exercise/Self-Care Comment Patient reported that he was independent with ADLs and mobility at baseline.  Stated he has not been needing to use a device.  His sister Ruba was present at end of visit and stated pt was a little unsteady at times even prior to this hospitalization.   General Information   Onset of Illness/Injury or Date of Surgery 07/04/24   Referring Physician Dr. Schneider   Patient/Family Therapy Goals Statement (PT) Return home and improve endurance and mobility.   Pertinent History of Current Problem (include personal factors and/or " comorbidities that impact the POC) Pt admitted for R sided pneumonia; cellulitis and soft tissue infection right lower extremity; metabolic acidosis and HTN.   Existing Precautions/Restrictions fall   General Observations Pt on room air throughout this visit.   Cognition   Affect/Mental Status (Cognition) WFL   Orientation Status (Cognition) oriented x 4   Follows Commands (Cognition) WFL   Pain Assessment   Patient Currently in Pain No   Integumentary/Edema   Integumentary/Edema Comments Pt with 2+ pitting edema bilat lower legs, ankles/feet.  Pt with redness thorughout anterior lower R leg including 1 blister and 1 mod large scabbed over area; few small patches of redness on LLE.  These being monitored by medical staff.   Posture    Posture Forward head position   Range of Motion (ROM)   ROM Comment LE AROM WFL throughout BLEs   Strength (Manual Muscle Testing)   Strength (Manual Muscle Testing) Deficits observed during functional mobility   Bed Mobility   Bed Mobility rolling left;supine-sit   Rolling Left [place] (Bed Mobility) modified independence   Supine-Sit [place] (Bed Mobility) modified independence   Assistive Device (Bed Mobility)   (None)   Transfers   Transfers bed-chair transfer;sit-stand transfer   Transfer Safety Concerns Noted decreased balance during turns;losing balance backward   Impairments Contributing to Impaired Transfers decreased strength;decreased sensation;impaired balance   Bed-Chair Transfer   Assistive Device (Bed-Chair Transfers) walker, front-wheeled   Bed-Chair [place] (Transfers) contact guard;supervision  (CGA/SBA)   Comment, (Bed-Chair Transfer) Min instability when turning.   Sit-Stand Transfer   Sit-Stand [place] (Transfers) contact guard;supervision  (CGA/SBA)   Assistive Device (Sit-Stand Transfers) walker, front-wheeled   Comment, (Sit-Stand Transfer) Pt moved sit->stand from bed up to FWW = CGA/SBA with brief/min posterior instabilty, but pt did  correct self.  Pt moved sit->stand from transport chair up to FWW = SBA.  Stand->sit = CGA/SBA with cues for slower/controlled descent.   Gait/Stairs (Locomotion)   [place] Level (Gait) contact guard;supervision  (CGA/SBA)   Assistive Device (Gait) walker, front-wheeled   Distance in Feet (Gait) 180   Pattern (Gait) step-through   Negotiation (Stairs) stairs independence;number of steps;handrail location;ascending technique;descending technique   [place] Level (Stairs) supervision;contact guard  (CGA/SBA)   Handrail Location (Stairs) both sides   Number of Steps (Stairs) 8   Ascending Technique (Stairs) step-over-step   Descending Technique (Stairs) step-over-step   Comment, (Gait/Stairs) Pt amb approx 180 ft CGA/SBA with FWW with occasional min unsteadiness and mild wide FILIBERTO initially, but stability improved and narrower FILIBERTO as pt continued to walk.  During stair negotiation - cues to watch feet to help ensure no missteps.   Balance   Balance Comments Sitting balance = Good w/o support.  Standing balance = Fair w/o support = Fair+/Good- with FWW support.   Sensory Examination   Sensory Perception Comments Light touch much diminished throughout bilat ankles/feet; mild/mod diminished in lower legs.   Coordination   Coordination no deficits were identified   Muscle Tone   Muscle Tone no deficits were identified   Clinical Impression   Criteria for Skilled Therapeutic Intervention Yes, treatment indicated   PT Diagnosis (PT) Gait difficulty   Influenced by the following impairments Functional LE weakness; decreased balance, decreased activity tolerance, decreased LE sensation (need to learn some adaptive strategies).   Functional limitations due to impairments Transfers, gait, stairs.   Clinical Presentation (PT Evaluation Complexity) stable   Clinical Presentation Rationale Clinical judgment   Clinical Decision Making (Complexity) low complexity   Planned Therapy Interventions (PT) balance training;gait  training;home exercise program;manual therapy techniques;patient/family education;ROM (range of motion);stair training;strengthening;stretching;transfer training;progressive activity/exercise   Risk & Benefits of therapy have been explained evaluation/treatment results reviewed;care plan/treatment goals reviewed;risks/benefits reviewed;current/potential barriers reviewed;participants voiced agreement with care plan;participants included;patient;sibling  (Sister Ruba)   Clinical Impression Comments PT eval completed and pt demonstrated mild/mod functional deficits related to decreased activity tolerance, balance impairment and LE weakness.  He would benefit from skilled PT services to address these impairments in order to help restore safe functional mobility as able.   PT Total Evaluation Time   PT Eval, Low Complexity Minutes (15616) 22   Physical Therapy Goals   PT Frequency 6x/week   PT Predicted Duration/Target Date for Goal Attainment 07/15/24   PT Goals Transfers;Gait;Stairs   PT: Transfers Modified independent;Sit to/from stand;Bed to/from chair;Assistive device   PT: Gait Modified independent;Rolling walker;Greater than 200 feet   PT: Stairs Supervision/stand-by assist;Greater than 10 stairs;Rail on both sides   Interventions   Interventions Quick Adds Therapeutic Activity   Therapeutic Activity   Therapeutic Activities: dynamic activities to improve functional performance Minutes (77416) 8   Symptoms Noted During/After Treatment Fatigue;Shortness of breath   Treatment Detail/Skilled Intervention Pt after stairs, pt amb approx 180 ft back to his room demonstrating improved stability compared to first bout with only rare slight instability and only slight too min wide FILIBERTO.  Upon returning to his room he sat down in recliner CGA/SBA.  Ended visit with pt in recliner, sister Ruba present, and EZEQUIEL Kamara now present working with pt.  Call button in easy reach.   PT Discharge Planning   PT Plan Progress  mobility, balance and activity tolerance as able.   PT Discharge Recommendation (DC Rec) home with home care physical therapy   PT Rationale for DC Rec Pt appears will be safe to D/C frida especially since daughter would be able to stay with pt for a few days.  Currently pt would benefit more from homecare PT, but if continues to improve would likely be able to participate in outpatient PT to help to return to PLOF.   PT Brief overview of current status Bed mob = Michael; sit<->stand = CGA/SBA; amb x 180 ft = CGA/SBA with FWW; stairs x 8 = CGA/SBA with bilat rails.   PT Equipment Needed at Discharge   (Possibly FWW)   Total Session Time   Timed Code Treatment Minutes 8   Total Session Time (sum of timed and untimed services) 30   Psychosocial Support   Trust Relationship/Rapport care explained;choices provided;emotional support provided;empathic listening provided;questions answered;questions encouraged;reassurance provided;thoughts/feelings acknowledged

## 2024-07-08 NOTE — PHARMACY
Pharmacy Antimicrobial Stewardship Assessment     Current Antimicrobial Therapy:  Anti-infectives (From now, onward)      Start     Dose/Rate Route Frequency Ordered Stop    24 0730  ceFEPIme (MAXIPIME) 2 g vial to attach to  mL bag for ADULTS or 50 mL bag for PEDS         2 g  over 30 Minutes Intravenous EVERY 24 HOURS 24 1110              Indication: SSTI    Days of Therapy: 5     Pertinent Labs:    Recent labs: (last 7 days)     24  1139 24  1400 24  1922 24  0455 24  1746 24  0520   WBC 7.2  --   --  15.6* 10.4 6.9   LACT 3.1* 2.2* 1.3  --   --   --    PCAL 0.08  --   --   --   --   --        Temperature:  Temp (24hrs), Av.4  F (36.9  C), Min:97.6  F (36.4  C), Max:98.9  F (37.2  C)      Culture Results:   7-Day Micro Results       Collected Updated Procedure Result Status      2024 1800 2024 0708 Urine Culture [[id number]]   Urine, [place] Catheter    Preliminary result Component Value   Culture No growth after 1 day  [P]                2024 1723 2024 MRSA MSSA PCR, Nasal Swab [[id number]]    Swab from Nares, Bilateral    Final result Component Value   MRSA Target DNA Negative   SA Target DNA Positive            2024 1249 2024 0617 Blood Culture Arm, Left [[id number]]   Blood from Arm, Left    Preliminary result Component Value   Culture No growth after 2 days  [P]                2024 1211 2024 0617 Blood Culture Arm, Left [[id number]]   Blood from Arm, Left    Preliminary result Component Value   Culture No growth after 2 days  [P]                2024 1134 2024 1216 Symptomatic Influenza A/B, RSV, & SARS-CoV2 PCR (COVID-19) Nose [[id number]]    Swab from Nose    Final result Component Value   Influenza A PCR Negative   Influenza B PCR Negative   RSV PCR Negative   SARS CoV2 PCR Negative   NEGATIVE: SARS-CoV-2 (COVID-19) RNA not detected, presumed negative.                 Recommendations/Interventions:  1. None at this time.    Tracie Shafer, [facility]  July 8, 2024

## 2024-07-08 NOTE — PROGRESS NOTES
07/08/24 1000   Appointment Info   Signing Clinician's Name / Credentials (OT) Leigha Lynch, OTR/L   Living Environment   People in Home alone   Current Living Arrangements house   Home Accessibility stairs within home  (ramp to enter home)   Number of Stairs, Within Home, Primary greater than 10 stairs   Transportation Anticipated car, drives self   Living Environment Comments Patient was living alone in a house with a ramp to enter. Bedroom and bathroom are on the main level and laundry and ninoska litter/ food are in the basement. Bathroom has a tub/shower combo without grab bars or bench. He reported that he utilizes his wall and sink to transfer on/off toilet as needed. He has a daughter and neighbors who are very supportive and offer assistance as needed.   Self-Care   Usual Activity Tolerance moderate   Current Activity Tolerance fair   Equipment Currently Used at Home   (wheelchair ramp)   Fall history within last six months yes   Number of times patient has fallen within last six months 2   Activity/Exercise/Self-Care Comment Patient reported that he was independent with ADLs and mobility at baseline.   Instrumental Activities of Daily Living (IADL)   Previous Responsibilities meal prep;housekeeping;laundry;medication management;finances;driving   IADL Comments Patient reported that he was independent with IADLs at baseline. He still drives and was independent with community mobility and grocery shopping.   General Information   Onset of Illness/Injury or Date of Surgery 07/04/24   Referring Physician Reji Schneider MD   Patient/Family Therapy Goal Statement (OT) Patient would like to return home.   Additional Occupational Profile Info/Pertinent History of Current Problem Patient presented to hospital with acute respiratory failure with hypoxia, cellulitis, community acquired pneumonia of right lung, and acute kidney failure with tubular necrosis.   Cognitive Status Examination   Orientation Status  orientation to person, place and time   Cognitive Status Comments WFL   Visual Perception   Visual Impairment/Limitations WNL   Pain Assessment   Patient Currently in Pain No   Posture   Posture not impaired   Range of Motion Comprehensive   General Range of Motion no range of motion deficits identified   Strength Comprehensive (MMT)   General Manual Muscle Testing (MMT) Assessment no strength deficits identified   Transfers   Transfers sit-stand transfer;toilet transfer   Sit-Stand Transfer   Sit-Stand [place] (Transfers) supervision   Sit/Stand Transfer Comments Patient was able to stand from recliner with SBA and fww.   Toilet Transfer   Type (Toilet Transfer) sit-stand   Toilet Transfer Comments Patient was able to transfer on/off toilet with SBA and grab bar.   Activities of Daily Living   BADL Assessment/Intervention lower body dressing;toileting;upper body dressing   Upper Body Dressing Assessment/Training   Comment, (Upper Body Dressing) Setup A to don robe   [place] Level (Upper Body Dressing) doff;don   Lower Body Dressing Assessment/Training   Comment, (Lower Body Dressing) Patient uses a sock aid to don socks at home.   Toileting   Comment, (Toileting) Patient was able to perform toileting tasks with SBA.   [place] Level (Toileting) toileting skills;adjust/manage clothing;perform perineal hygiene;supervision   Clinical Impression   Criteria for Skilled Therapeutic Interventions Met (OT) Yes, treatment indicated   OT Diagnosis Impaired ADLs and activity tolerance   OT Problem List-Impairments impacting ADL problems related to;activity tolerance impaired;mobility;strength   Assessment of Occupational Performance 1-3 Performance Deficits   Identified Performance Deficits Impaired ADLs and activity tolerance   Planned Therapy Interventions (OT) ADL retraining;IADL retraining;home program guidelines;progressive activity/exercise;strengthening   Clinical Decision Making Complexity (OT) problem  focused assessment/low complexity   Risk & Benefits of therapy have been explained evaluation/treatment results reviewed;care plan/treatment goals reviewed;risks/benefits reviewed;current/potential barriers reviewed;participants voiced agreement with care plan;participants included;patient   Clinical Impression Comments Patient was previously living alone and was independent with ADLs, IADLs, and mobility. He has a good support system who lives close and is able to provide assistance if needed. During evaluation, he was able to perform toileting tasks and upper body dressing with SBA. He ambulated 100 feet in the hallway with CGA and fww with some fatigue noted.   OT Total Evaluation Time   OT Eval, Low Complexity Minutes (66593) 16   OT Goals   Therapy Frequency (OT) 5 times/week   OT Predicted Duration/Target Date for Goal Attainment 07/15/24   OT Goals Lower Body Dressing;Toilet Transfer/Toileting;Aerobic Activity   OT: Lower Body Dressing Modified independent   OT: Toilet Transfer/Toileting Modified independent   OT: Perform aerobic activity with stable cardiovascular response 10 minutes   Interventions   Interventions Quick Adds Therapeutic Activity   Therapeutic Activities   Therapeutic Activity Minutes (05005) 8   Symptoms noted during/after treatment fatigue;shortness of breath   Treatment Detail/Skilled Intervention Patient was seated in recliner at time of OT arrival. He was able to transfer from recliner with SBA and fww. Educated patient on safety strategies to address dizziness and lightheadedness upon standing. He ambulated ~100 feet in the [place] with CGA and fww. Patient did report fatigue and demonstrated some shortness of breath following ambulation. O2 was at 96% while on room air following ambulation. Patient left resting in recliner with call light within reach.   OT Discharge Planning   OT Plan Progress ADLs, activity tolerance, and mobility   OT Discharge Recommendation (DC Rec) home with assist    OT Rationale for DC Rec Patient was previously living alone and was independent with ADLs, IADLs, and mobility. He has a good support system who lives close and is able to provide assistance if needed. During evaluation, he was able to perform toileting tasks and upper body dressing with SBA. He ambulated 100 feet in the hallway with CGA and fww with some fatigue noted. Anticipate patient will be safe to return home with assistance for IADLs as needed.   OT Brief overview of current status SBA for toileting; SBA and fww for transfers; patient ambulated ~100 feet with CGA and fww   Total Session Time   Timed Code Treatment Minutes 8   Total Session Time (sum of timed and untimed services) 24   Psychosocial Support   Trust Relationship/Rapport care explained;choices provided;emotional support provided;empathic listening provided;questions answered;questions encouraged;reassurance provided;thoughts/feelings acknowledged

## 2024-07-08 NOTE — PLAN OF CARE
Goal Outcome Evaluation:blood pressure was elevated, metoprolol was administered, at re check, the blood pressure was still elevated, apresoline was given, and re check the blood pressure was still elevated, MD was text paged to update.

## 2024-07-08 NOTE — PLAN OF CARE
Goal Outcome Evaluation:  Blood pressure was elevated, oral antihypertensive medication administered, at the blood pressure re check, the blood pressure was still elevated, as a result, apresoline administered, will continue to monitor

## 2024-07-09 ENCOUNTER — APPOINTMENT (OUTPATIENT)
Dept: PHYSICAL THERAPY | Facility: HOSPITAL | Age: 64
DRG: 602 | End: 2024-07-09
Payer: MEDICARE

## 2024-07-09 VITALS
OXYGEN SATURATION: 97 % | BODY MASS INDEX: 40.18 KG/M2 | HEIGHT: 70 IN | DIASTOLIC BLOOD PRESSURE: 86 MMHG | TEMPERATURE: 98.9 F | SYSTOLIC BLOOD PRESSURE: 175 MMHG | HEART RATE: 83 BPM | WEIGHT: 280.65 LBS | RESPIRATION RATE: 18 BRPM

## 2024-07-09 LAB
ANION GAP SERPL CALCULATED.3IONS-SCNC: 12 MMOL/L (ref 7–15)
BACTERIA BLD CULT: NO GROWTH
BACTERIA BLD CULT: NO GROWTH
BACTERIA UR CULT: NO GROWTH
BUN SERPL-MCNC: 36.6 MG/DL (ref 8–23)
CALCIUM SERPL-MCNC: 8.8 MG/DL (ref 8.8–10.2)
CHLORIDE SERPL-SCNC: 108 MMOL/L (ref 98–107)
CREAT SERPL-MCNC: 1.93 MG/DL (ref 0.67–1.17)
DEPRECATED HCO3 PLAS-SCNC: 23 MMOL/L (ref 22–29)
EGFRCR SERPLBLD CKD-EPI 2021: 39 ML/MIN/1.73M2
ERYTHROCYTE [DISTWIDTH] IN BLOOD BY AUTOMATED COUNT: 12.8 % (ref 10–15)
GLUCOSE BLDC GLUCOMTR-MCNC: 76 MG/DL (ref 70–99)
GLUCOSE SERPL-MCNC: 111 MG/DL (ref 70–99)
HCT VFR BLD AUTO: 36.8 % (ref 40–53)
HGB BLD-MCNC: 12.5 G/DL (ref 13.3–17.7)
MCH RBC QN AUTO: 30.9 PG (ref 26.5–33)
MCHC RBC AUTO-ENTMCNC: 34 G/DL (ref 31.5–36.5)
MCV RBC AUTO: 91 FL (ref 78–100)
PLATELET # BLD AUTO: 213 10E3/UL (ref 150–450)
POTASSIUM SERPL-SCNC: 3.9 MMOL/L (ref 3.4–5.3)
RBC # BLD AUTO: 4.05 10E6/UL (ref 4.4–5.9)
SODIUM SERPL-SCNC: 143 MMOL/L (ref 135–145)
WBC # BLD AUTO: 7.7 10E3/UL (ref 4–11)

## 2024-07-09 PROCEDURE — 97530 THERAPEUTIC ACTIVITIES: CPT | Mod: GP

## 2024-07-09 PROCEDURE — 250N000013 HC RX MED GY IP 250 OP 250 PS 637: Performed by: INTERNAL MEDICINE

## 2024-07-09 PROCEDURE — 36415 COLL VENOUS BLD VENIPUNCTURE: CPT | Performed by: INTERNAL MEDICINE

## 2024-07-09 PROCEDURE — 99239 HOSP IP/OBS DSCHRG MGMT >30: CPT | Performed by: INTERNAL MEDICINE

## 2024-07-09 PROCEDURE — 85027 COMPLETE CBC AUTOMATED: CPT | Performed by: INTERNAL MEDICINE

## 2024-07-09 PROCEDURE — 84295 ASSAY OF SERUM SODIUM: CPT | Performed by: INTERNAL MEDICINE

## 2024-07-09 PROCEDURE — 250N000011 HC RX IP 250 OP 636: Performed by: HOSPITALIST

## 2024-07-09 PROCEDURE — 250N000011 HC RX IP 250 OP 636: Performed by: INTERNAL MEDICINE

## 2024-07-09 RX ORDER — CEFDINIR 300 MG/1
300 CAPSULE ORAL 2 TIMES DAILY
Qty: 10 CAPSULE | Refills: 0 | Status: SHIPPED | OUTPATIENT
Start: 2024-07-09 | End: 2024-07-14

## 2024-07-09 RX ADMIN — LEVOTHYROXINE SODIUM 175 MCG: 0.1 TABLET ORAL at 06:00

## 2024-07-09 RX ADMIN — DORZOLAMIDE 1 DROP: 20 SOLUTION/ DROPS OPHTHALMIC at 09:16

## 2024-07-09 RX ADMIN — BRIMONIDINE TARTRATE 1 DROP: 2 SOLUTION OPHTHALMIC at 09:21

## 2024-07-09 RX ADMIN — METOPROLOL TARTRATE 50 MG: 25 TABLET, FILM COATED ORAL at 09:15

## 2024-07-09 RX ADMIN — CEFEPIME 2 G: 2 INJECTION, POWDER, FOR SOLUTION INTRAVENOUS at 09:20

## 2024-07-09 RX ADMIN — HEPARIN SODIUM 5000 UNITS: 5000 INJECTION, SOLUTION INTRAVENOUS; SUBCUTANEOUS at 09:22

## 2024-07-09 ASSESSMENT — ACTIVITIES OF DAILY LIVING (ADL)
ADLS_ACUITY_SCORE: 35
ADLS_ACUITY_SCORE: 33
ADLS_ACUITY_SCORE: 35
ADLS_ACUITY_SCORE: 33
ADLS_ACUITY_SCORE: 35
ADLS_ACUITY_SCORE: 35

## 2024-07-09 NOTE — DISCHARGE SUMMARY
[facility]    Discharge Summary  Hospitalist    Date of Admission:  7/4/2024  Date of Discharge:  7/9/2024  Discharging Provider: Reji Schneider MD, MD  Date of Service (when I saw the patient): 07/09/24    Discharge Diagnoses   Principal Problem:    Acute respiratory failure with hypoxia (H)  Active Problems:    Uncontrolled type 2 DM with hyperosmolar nonketotic hyperglycemia (H)    Generalized muscle weakness    Essential hypertension    Community acquired pneumonia of right lung, unspecified part of lung    Cellulitis    Acute kidney failure with tubular necrosis (H)      History of Present Illness   Homer Triana is an 63 year old male who presented with right lower extremity swelling, shortness of breath.  Please see admission H+P for additional details.    Hospital Course   Homer Triana was admitted on 7/4/2024.  63-year-old male with a history of insulin-dependent diabetes mellitus managed with an insulin pump, hypertension, chronic kidney disease with baseline creatinine about 1.5 presented with right lower extremity swelling and redness typical of cellulitis.  Chest x-ray also found a right sided pneumonia.  Initial labs showed significant metabolic acidosis as well as hyperkalemia.  Patient then developed acute kidney injury, possibly contrast nephropathy.  His creatinine did exceed 4.  Patient was treated with IV fluids as well as empiric cefepime.  Blood cultures showed no growth.  Patient improved empirically on empiric cefepime.  Right-sided infiltrate, however he had no respiratory failure.  Appearance of right lower extremity improved.  He did have some blistering that popped, but erythema and induration stayed within marked borders, and has even started to recede.  Clinically, patient feels much better at this point.  Creatinine improved with IV fluids to 1.93, which is only slightly above his baseline.  He received 5 days of IV cefepime, we will continue him on outpatient  cefdinir for 5 days for total of 10 days treatment.  He had PT/OT evaluation at the end of his hospitalization, cleared to discharge home.  He will follow-up with his primary care physician within 7 to 10 days to assess for continued improvement.    Reji Schneider MD      Significant Results and Procedures   See below    Pending Results   These results will be followed up by Manish Hunt    Unresulted Labs Ordered in the Past 30 Days of this Admission       Date and Time Order Name Status Description    7/4/2024 11:41 AM Blood Culture Arm, Left Preliminary     7/4/2024 11:41 AM Blood Culture Arm, Left Preliminary             Code Status   Full Code       Primary Care Physician   Manish Hunt    Physical Exam   Temp: 98.9  F (37.2  C) Temp src: Tympanic BP: 175/86 Pulse: 83   Resp: 18 SpO2: 97 % O2 Device: None (Room air)    Vitals:    07/04/24 1135 07/06/24 0739   Weight: 124.8 kg (275 lb 2.2 oz) 127.3 kg (280 lb 10.3 oz)     Vital Signs with Ranges  Temp:  [97.8  F (36.6  C)-98.9  F (37.2  C)] 98.9  F (37.2  C)  Pulse:  [76-83] 83  Resp:  [18-20] 18  BP: (155-208)/(65-92) 175/86  SpO2:  [94 %-97 %] 97 %  I/O last 3 completed shifts:  In: 619 [P.O.:480; I.V.:139]  Out: 900 [Urine:900]    General: Patient laying in bed in no acute distress  Respiratory: Diminished air entry in both lower zones  Cardiovascular: Regular rate and rhythm with first and second  GI: Abdomen is soft with positive bowel sounds  Integumentary: Right lower extremity erythema and anterior shin with blistering       Discharge Disposition   Discharged to home  Condition at discharge: Stable    Consultations This Hospital Stay   PHARMACY TO DOSE VANCO  PHYSICAL THERAPY ADULT IP CONSULT  OCCUPATIONAL THERAPY ADULT IP CONSULT    Time Spent on this Encounter   Reji VIRAMONTES MD, personally saw the patient today and spent greater than 30 minutes discharging this patient.      Discharge Orders      Physical Therapy  Referral       Reason for your hospital stay    Lower extremity cellulitis, community-acquired pneumonia     Follow-up and recommended labs and tests     Follow up with primary care provider, Manish Hunt, within 7 days for hospital follow- up.  No follow up labs or test are needed.     Activity    Your activity upon discharge: activity as tolerated     Diet    Follow this diet upon discharge: Orders Placed This Encounter      Consistent Carbohydrate Diet Moderate Consistent Carb (60 g CHO per Meal) Diet     Discharge Medications   Current Discharge Medication List        START taking these medications    Details   cefdinir (OMNICEF) 300 MG capsule Take 1 capsule (300 mg) by mouth 2 times daily for 5 days  Qty: 10 capsule, Refills: 0    Associated Diagnoses: Community acquired pneumonia of right lung, unspecified part of lung; Acute kidney failure with tubular necrosis (H24)           CONTINUE these medications which have NOT CHANGED    Details   ASCORBIC ACID PO Take 1,000 mg by mouth daily (Gummy)      blood glucose (ACCU-CHEK GUIDE) test strip 1 strip by In Vitro route 4 times daily      Blood Glucose Monitoring Suppl (ACCU-CHEK GUIDE) w/Device KIT by In Vitro route 4 times daily      brimonidine (ALPHAGAN) 0.2 % ophthalmic solution Place 1 drop into both eyes 2 times daily (AM and PM)      dorzolamide (TRUSOPT) 2 % ophthalmic solution Place 1 drop into both eyes 2 times daily      hydrochlorothiazide (HYDRODIURIL) 25 MG tablet Take 25 mg by mouth every evening       INSULIN PUMP - OUTPATIENT Insulin Pump Brand: Medtronic [id number]  Pump Basal Rates-Times:1.3 units/hour at midnight, 1.1 at 3:00 a.m., 1.1 at noon, and 1.2 at 3:00 p.m  CARB RATIO: 15  Correction factor: 35  Pt and Provider established A1C goal: 8.0  Active Insulin Time:  4 hours  Average total daily dose of insulin is 44 units, 64% basal and 36% bolus insulin  Provider: Lindsay J Schwab-Peterson, MERON      levothyroxine (SYNTHROID/LEVOTHROID) 175 MCG  tablet Take 1 tablet by mouth every morning upon waking or at least 30 minutes before first food and drink of the day      lisinopril (PRINIVIL/ZESTRIL) 40 MG tablet Take 40 mg by mouth every evening       metoprolol tartrate (LOPRESSOR) 50 MG tablet Take 50 mg by mouth 2 times daily       NOVOLOG VIAL 100 UNIT/ML soln Inject 70 Units Subcutaneous Continuous with insulin pump      rosuvastatin (CRESTOR) 10 MG tablet Take 10 mg by mouth every evening            Allergies   No Known Allergies  Data   Most Recent 3 CBC's:  Recent Labs   Lab Test 07/09/24  0533 07/08/24  0520 07/07/24  0509 07/06/24  1746   WBC 7.7 6.9  --  10.4   HGB 12.5* 11.9*  --  12.5*   MCV 91 94  --  96    173 193 157      Most Recent 3 BMP's:  Recent Labs   Lab Test 07/09/24  0533 07/08/24  2059 07/08/24  1702 07/08/24  1314 07/08/24  1211 07/08/24  0927 07/08/24  0520     --   --   --  139  --  140   POTASSIUM 3.9  --   --   --  4.6  --  4.1   CHLORIDE 108*  --   --   --  109*  --  108*   CO2 23  --   --   --  16*  --  19*   BUN 36.6*  --   --   --  45.8*  --  50.4*   CR 1.93*  --   --   --  2.23*  --  2.39*   ANIONGAP 12  --   --   --  14  --  13   DESMOND 8.8  --   --   --  8.3*  --  8.0*   * 257* 265*   < > 251*   < > 272*    < > = values in this interval not displayed.     Most Recent 2 LFT's:  Recent Labs   Lab Test 07/04/24  1139 06/29/22  1255   AST 26 21   ALT 34 33   ALKPHOS 80 93   BILITOTAL 0.6 0.8     Most Recent INR's and Anticoagulation Dosing History:  Anticoagulation Dose History          Latest Ref Rng & Units 11/22/2018 9/16/2021   Recent Dosing and Labs   INR 0.85 - 1.15 0.83  0.92        Effective 7/11/2021, the reference range for this assay has changed.      Details                 Most Recent 3 Troponin's:  Recent Labs   Lab Test 09/16/21  1020 03/20/19  1632 10/06/18  1710   TROPI  --  <0.015 <0.015   TROPONIN <0.015  --   --      Most Recent Cholesterol Panel:  Recent Labs   Lab Test 10/25/18  0000    CHOL 182  182   LDL 98   HDL 64  64   TRIG 99  99     Most Recent 6 Bacteria Isolates From Any Culture (See EPIC Reports for Culture Details):  Recent Labs   Lab Test 10/06/18  1709   CULT No growth after 6 days  1 of 2 bottles  Coagulase negative Staphylococcus  Susceptibility testing not routinely done  *  Critical Value called to and read back by  Ana Rankin @ 0514 on 10/09/2018 by AM  Gram stain of bottle 1 called    Gram stain review consistent with reported results.     Most Recent TSH, T4 and A1c Labs:  Recent Labs   Lab Test 07/04/24  1139 09/16/21  1020 03/20/19  1632   TSH 1.89   < >  --    A1C  --   --  8.2*    < > = values in this interval not displayed.     Results for orders placed or performed during the hospital encounter of 07/04/24   CTA Chest with Contrast    Narrative    EXAM: CTA CHEST WITH CONTRAST, 7/4/2024 12:48 PM    HISTORY: Shortness of breath    COMPARISON: Chest radiograph 9/16/2021    TECHNIQUE:   Imaging protocol: Multiplanar CT images of the chest after  administration of intravenous contrast. Meds given: ISOVUE 370  76mL.  Acquisition: This CT exam was performed using one or more the  following dose reduction techniques: automated exposure control,  adjustment of the mA and/or kV according to patient size, and/or  iterative reconstruction technique.  Processing: 3D rendering on independent workstation using Maximum  Intensity Projection (MIP) was performed and archived to PACS. 3D  reconstructions are interpreted and reported by supervising  radiologist.    FINDINGS:     CHEST:    VESSELS AND HEART: There is adequate contrast bolus in the study.  Contrast bolus adequately opacifies the pulmonary arteries. No acute  pulmonary emboli to the subsegmental level. Aorta is nondilated and  demonstrates no acute abnormality. No cardiomegaly. Severe coronary  calcification.  LUNGS: Central airways are clear. Diffuse right lung tree-in-bud  opacities, greatest in the right lower lobe  and right upper lobe apex.  No pulmonary mass. Scattered discoid and subpleural atelectasis.  PLEURA: Within normal limits.  LYMPH NODES: No enlarged lymph nodes. Prominent mediastinal lymph  nodes.  THYROID: Within normal limits.    BONES AND SOFT TISSUES:  No suspicious osseous lesions. Degenerative periarticular changes and  spinal spondylosis.    UPPER ABDOMEN:  Limited evaluation of the upper abdomen demonstrates no acute  parenchymal abnormalities, nonobstructive bowel gas pattern, and no  free fluid or free air. Cholecystectomy changes.      Impression    IMPRESSION:   1.  No acute pulmonary emboli to the subsegmental level.  2.  Multifocal right lung infiltrates, concerning for  bronchopneumonia.    SERGE GAYTAN MD         SYSTEM ID:  [id number]   Head CT w/o contrast    Narrative    EXAM: CT HEAD W/O CONTRAST, 7/4/2024 12:45 PM    HISTORY: New onset headache with nausea    COMPARISON: CT head 9/16/2021    TECHNIQUE:   Imaging protocol: Multiplanar CT images of the head without  intravenous contrast.   Acquisition: This CT exam was performed using one or more the  following dose reduction techniques: automated exposure control,  adjustment of the mA and/or kV according to patient size, and/or  iterative reconstruction technique.    FINDINGS:  No intracranial hemorrhage, mass effect, or midline shift. The  ventricles are proportionate to the cerebral sulci. Patchy foci of  hypodensity in the periventricular and subcortical white matter, which  is nonspecific, likely related to chronic small vessel ischemic  disease given the patient's age. No acute loss of gray-white matter  differentiation. Atherosclerotic arterial calcifications.    No acute osseous abnormality. No paranasal sinus mucosal thickening.  Mastoid air cells are clear. Bilateral pseudophakia.       Impression    IMPRESSION:  No acute intracranial abnormality.      SEGRE GAYTAN MD         SYSTEM ID:  [id number]   POC US ECHO LIMITED    Narrative     Jacob Nguyen APRN CNP     7/4/2024  2:35 PM  POC US ECHO LIMITED    Date/Time: 7/4/2024 12:23 PM    Performed by: Jacob Nguyen APRN CNP  Authorized by: Jacob Nguyen APRN CNP    Comments:      Limited Bedside Cardiac Ultrasound, performed and interpreted by me.   Indication: Chest Pain and Shortness of Breath.  Parasternal long axis, parasternal short axis, apical 4 chamber, and lung   views were acquired.   Image quality was satisfactory.    Findings:    Global left ventricular function appears intact.  There is no evidence of free fluid within the pericardium.    IMPRESSION: Grossly normal left ventricular function and chamber size.  No   pericardial effusion.      CT Abdomen Pelvis w/o Contrast    Narrative    EXAM: CT ABDOMEN PELVIS W/O CONTRAST 7/6/2024 9:27 AM    HISTORY: FRANCES    COMPARISON: Renal ultrasound 9/17/2021; CT chest 7/4/2024    TECHNIQUE:   Imaging protocol: Computed tomography of abdomen and pelvis without  contrast.  Acquisition: This CT exam was performed using one or more the  following dose reduction techniques: automated exposure control,  adjustment of the mA and/or kV according to patient size, and/or  iterative reconstruction technique.    FINDINGS:    LOWER CHEST:  Bibasilar atelectasis. No pulmonary mass. Right lower lobe bronchial  wall thickening and patchy tree-in-bud opacities.    ABDOMEN/PELVIS:  LIVER: Normal contour. No suspicious liver lesions.  GALLBLADDER: Surgically absent.  BILE DUCTS: No biliary tract dilatation.  PANCREAS: Within normal limits.  SPLEEN: Within normal limits.  ADRENALS: Within normal limits.    KIDNEYS/URETERS: No soft tissue mass. 1.4 cm right renal cortical  cyst. No hydronephrosis. No nephrolithiasis. Some ill-defined density  along the calyces without distinct stone, possibly residual contrast  from recent administration of intravenous contrast.  URINARY BLADDER: Radiodense fluid in the bladder, consistent with  recent administration of IV  contrast.  REPRODUCTIVE ORGANS: No pelvic masses.    BOWEL: No bowel dilatation. Mild mucosal thickening of the first and  second segment duodenum. Normal appendix.  PERITONEUM/RETROPERITONEUM: No free air. Trace free fluid. Mesenteric  and retroperitoneal anasarca.  VESSELS: Atherosclerotic calcification of the aorta without aneurysmal  dilation.  LYMPH NODES: There are no pathologically enlarged lymph nodes.    BONES AND SOFT TISSUES:  No suspicious osseous lesions. Subcutaneous anasarca. Degenerative  periarticular changes and spinal spondylosis. Transitional lumbosacral  anatomy with partially sacralized L5 vertebral body.      Impression    IMPRESSION:  1.  No hydronephrosis or obstructive uropathy.  2.  Mild mucosal thickening of the second and third segment duodenum,  likely representing duodenitis. Consider short-term follow-up upper  endoscopy.  3.  Right lower lobe opacities, likely representing bronchopneumonia.    SERGE GAYTAN MD         SYSTEM ID:  [id number]   Echocardiogram Complete     Value    LVEF  60-65%    Narrative    [id number]  [id number]  [id number]  [id number]^[email or web address]     [facility]  Echocardiography Laboratory  [address] [place] [address]     Name: DUNCAN GUILLEN  MRN: [id number]  : 1960  Study Date: 2024 10:37 AM  Age: 63 yrs  Gender: Male  Patient Location: [facility]  Reason For Study: Respiratory Failure  History: Respiratory failure  Ordering Physician: JENA DE LA FUENTE  Performed By: Ann Whiting RDCS     BSA: 2.4 m2  Height: 70 in  Weight: 280 lb  ______________________________________________________________________________  Procedure  Complete Portable Echo Adult. Contrast Definity. Patient was given 2ml mixture  of 1.5ml Definity and 8.5ml saline. 8 ml wasted.  ______________________________________________________________________________  Interpretation Summary  Global and regional left ventricular function is normal with an EF  of 60-65%.  Mild concentric wall thickening consistent with left ventricular hypertrophy  is present.  Diastolic Doppler findings (E/E' ratio and/or other parameters) suggest left  ventricular filling pressures are increased.  Both atria appear normal.  No aortic stenosis is present.  The tricuspid valve is normal.  Global right ventricular function is normal.  ______________________________________________________________________________  Left Ventricle  Global and regional left ventricular function is normal with an EF of 60-65%.  Mild concentric wall thickening consistent with left ventricular hypertrophy  is present. Diastolic Doppler findings (E/E' ratio and/or other parameters)  suggest left ventricular filling pressures are increased. Left ventricular  diastolic function is indeterminate.     Right Ventricle  Global right ventricular function is normal.     Atria  Both atria appear normal.     Mitral Valve  The mitral valve is normal.     Aortic Valve  The mean AoV pressure gradient is 2.6 mmHg. The peak AoV pressure gradient is  5.2 mmHg. No aortic stenosis is present.     Tricuspid Valve  The tricuspid valve is normal.     Pulmonic Valve  Trace pulmonic insufficiency is present.     Vessels  Ascending aorta 3.67 cm.     Pericardium  No pericardial effusion is present.     ______________________________________________________________________________  MMode/2D Measurements & Calculations  IVSd: 1.4 cm  LVIDd: 4.9 cm  LVIDs: 3.4 cm  LVPWd: 1.4 cm  FS: 29.8 %  LV mass(C)d: 273.2 grams  LV mass(C)dI: 113.5 grams/m2  Ao root diam: 3.9 cm  LA dimension: 3.6 cm     asc Aorta Diam: 3.7 cm  LA/Ao: 0.92  LVOT diam: 2.3 cm  LVOT area: 4.1 cm2  Ao root diam index Ht(cm/m): 2.2  Ao root diam index BSA (cm/m2): 1.6  Asc Ao diam index BSA (cm/m2): 1.5  Asc Ao diam index Ht(cm/m): 2.1  EF Biplane: 67.5 %  LA Volume Indexed (AL/bp): 26.7 ml/m2  RWT: 0.58  TAPSE: 2.4 cm     Doppler Measurements & Calculations  MV E max ld:  100.0 cm/sec  MV A max pavan: 97.0 cm/sec  MV E/A: 1.0  MV dec slope: 632.0 cm/sec2  MV dec time: 0.16 sec  Ao V2 max: 114.0 cm/sec  Ao max P.2 mmHg  Ao V2 mean: 75.0 cm/sec  Ao mean P.6 mmHg  Ao V2 VTI: 23.5 cm  JULIEN(I,D): 4.0 cm2  JULIEN(V,D): 3.5 cm2  LV V1 max PG: 3.7 mmHg  LV V1 max: 96.0 cm/sec  LV V1 VTI: 22.7 cm  SV(LVOT): 93.5 ml  SI(LVOT): 38.8 ml/m2  AV Pavan Ratio (DI): 0.84  JULIEN Index (cm2/m2): 1.7  E/E' av.2  Lateral E/e': 14.7  Medial E/e': 17.7     ______________________________________________________________________________  Report approved by: Huan Meyer 2024 07:37 AM

## 2024-07-09 NOTE — DISCHARGE INSTRUCTIONS
Dr. Hunt's office will call you as  soon as possible to set up your hospital follow up appointment.

## 2024-07-09 NOTE — PROGRESS NOTES
Occupational Therapy Discharge Summary    Reason for therapy discharge:    Discharged to home.    Progress towards therapy goal(s). See goals on Care Plan in [facility] electronic health record for goal details.  Goals partially met.  Barriers to achieving goals:   discharge from facility.    Therapy recommendation(s):    No further OT recommended at this time.

## 2024-07-09 NOTE — PLAN OF CARE
Free from falls/injuries this shift. Dx: Cellulitis RLE. Continues on IV Maxipime. Assess as charted. Gets self to BR. Voids without diff. Glucose: 255 & 257.     Face to face report given with opportunity to observe patient.    Report given to Shoshana Contreras RN   7/8/2024  10:48 PM

## 2024-07-09 NOTE — PLAN OF CARE
Pt is A&O, vs and assessments as charted. Denies pain. Afebrile. Cellulitis to RLE remains within borders. Some redness and weeping noted to LLE. Voids spontaneously w/o difficulty. PIV SL. IV Maxipime q24hrs. 0200  per pt provided dexacom. Bed is locked and low, call light within reach, pt makes needs known.         Face to face report given with opportunity to observe patient.    Report given to EZEQUIEL Kamara RN   7/9/2024  7:13 AM

## 2024-07-09 NOTE — PLAN OF CARE
Goal Outcome Evaluation:Patient discharged at 1:17 PM via wheel chair accompanied by sister and staff. Prescriptions sent to patients preferred pharmacy. All belongings sent with patient.     Discharge instructions reviewed with patient. Listed belongings gathered and returned to patient. yes    Patient discharged to home.   Report called to N/A    Surgical Patient   Surgical Procedures during stay: N/A  Did patient receive discharge instruction on wound care and recognition of infection symptoms? N/A    MISC  Follow up appointment made:  Yes  Home medications returned to patient: N/A  Patient reports pain was well managed at discharge: Yes

## 2024-07-09 NOTE — PLAN OF CARE
Goal Outcome Evaluation:ok for patient to use his own insulin pump, and supplies as he does at home, patient is to be discharged today, per MD is he does well this morning we can discharge him.

## 2024-07-09 NOTE — PROGRESS NOTES
07/09/24 2455   Appointment Info   Signing Clinician's Name / Credentials (PT) Russ Lombardi DPT   Rehab Comments (PT) Pt sitting up on EOB upon approach as he just finished breakfast.  Pt was very agreeable to treatment.   Therapeutic Activity   Therapeutic Activities: dynamic activities to improve functional performance Minutes (70974) 20   Symptoms Noted During/After Treatment Fatigue  (Min)   Treatment Detail/Skilled Intervention Pt moved sit->stand form bed upt to FWW Michael today w/o instability.  Pt amb to entryway of his room, but noticed L sock was wet from where he spilled his pop so had pt sit down in recliner and changed his socks and cleaned up moisture on the floor.  Pt then amb approx 250 ft - 200 ft Michael with FWW with min wide FILIBERTO and good overall stability - then last 50 ft SBA w/o device and still stable overall, but with mild bilat Trendelenburg gait.  Pt in agreement with PT that he is more stable and safer amb with FWW at this time versus no device.  FWW was issued to pt and set it to appropriate height.  Pt agreeable to attending outpatient PT to help work back to at least baseline mobility level.   PT Discharge Planning   PT Plan D/C acute care PT as pt scheduled to D/C from hospital later today.   PT Discharge Recommendation (DC Rec) home with outpatient physical therapy   PT Rationale for DC Rec Pt's daughter will stay with pt fo a few days and pt did better with mobility today and now more appropriate for outpatient versus home health PT.   PT Brief overview of current status Amb x 200 ft Michael with FWW; amb x 50 ft SBA w/o device.   PT Equipment Needed at Discharge   (FWW issued to pt today)   Total Session Time   Timed Code Treatment Minutes 20   Total Session Time (sum of timed and untimed services) 20   Psychosocial Support   Trust Relationship/Rapport choices provided;care explained;questions answered;questions encouraged;reassurance provided;thoughts/feelings acknowledged

## 2024-09-04 ENCOUNTER — HOSPITAL ENCOUNTER (EMERGENCY)
Facility: HOSPITAL | Age: 64
Discharge: HOME | End: 2024-09-04
Attending: STUDENT IN AN ORGANIZED HEALTH CARE EDUCATION/TRAINING PROGRAM | Admitting: STUDENT IN AN ORGANIZED HEALTH CARE EDUCATION/TRAINING PROGRAM
Payer: MEDICARE

## 2024-09-04 VITALS
SYSTOLIC BLOOD PRESSURE: 179 MMHG | TEMPERATURE: 98.9 F | OXYGEN SATURATION: 96 % | RESPIRATION RATE: 16 BRPM | DIASTOLIC BLOOD PRESSURE: 78 MMHG | HEART RATE: 96 BPM

## 2024-09-04 DIAGNOSIS — R42 ORTHOSTATIC DIZZINESS: ICD-10-CM

## 2024-09-04 LAB
ALBUMIN SERPL BCG-MCNC: 4.1 G/DL (ref 3.5–5.2)
ALP SERPL-CCNC: 110 U/L (ref 40–150)
ALT SERPL W P-5'-P-CCNC: 26 U/L (ref 0–70)
ANION GAP SERPL CALCULATED.3IONS-SCNC: 9 MMOL/L (ref 7–15)
AST SERPL W P-5'-P-CCNC: 25 U/L (ref 0–45)
BASE EXCESS BLDV CALC-SCNC: -3 MMOL/L (ref -3–3)
BASOPHILS # BLD AUTO: 0.1 10E3/UL (ref 0–0.2)
BASOPHILS NFR BLD AUTO: 1 %
BILIRUB SERPL-MCNC: 0.4 MG/DL
BUN SERPL-MCNC: 35.4 MG/DL (ref 8–23)
CALCIUM SERPL-MCNC: 9.4 MG/DL (ref 8.8–10.4)
CHLORIDE SERPL-SCNC: 105 MMOL/L (ref 98–107)
CREAT SERPL-MCNC: 1.65 MG/DL (ref 0.67–1.17)
EGFRCR SERPLBLD CKD-EPI 2021: 47 ML/MIN/1.73M2
EOSINOPHIL # BLD AUTO: 0.2 10E3/UL (ref 0–0.7)
EOSINOPHIL NFR BLD AUTO: 3 %
ERYTHROCYTE [DISTWIDTH] IN BLOOD BY AUTOMATED COUNT: 12.1 % (ref 10–15)
GLUCOSE SERPL-MCNC: 84 MG/DL (ref 70–99)
HCO3 BLDV-SCNC: 25 MMOL/L (ref 21–28)
HCO3 SERPL-SCNC: 24 MMOL/L (ref 22–29)
HCT VFR BLD AUTO: 45.7 % (ref 40–53)
HGB BLD-MCNC: 14.9 G/DL (ref 13.3–17.7)
HOLD SPECIMEN: NORMAL
HOLD SPECIMEN: NORMAL
IMM GRANULOCYTES # BLD: 0 10E3/UL
IMM GRANULOCYTES NFR BLD: 0 %
LACTATE SERPL-SCNC: 1.2 MMOL/L (ref 0.7–2)
LYMPHOCYTES # BLD AUTO: 1.1 10E3/UL (ref 0.8–5.3)
LYMPHOCYTES NFR BLD AUTO: 18 %
MAGNESIUM SERPL-MCNC: 2.2 MG/DL (ref 1.7–2.3)
MCH RBC QN AUTO: 30.1 PG (ref 26.5–33)
MCHC RBC AUTO-ENTMCNC: 32.6 G/DL (ref 31.5–36.5)
MCV RBC AUTO: 92 FL (ref 78–100)
MONOCYTES # BLD AUTO: 0.7 10E3/UL (ref 0–1.3)
MONOCYTES NFR BLD AUTO: 12 %
NEUTROPHILS # BLD AUTO: 4.2 10E3/UL (ref 1.6–8.3)
NEUTROPHILS NFR BLD AUTO: 67 %
NRBC # BLD AUTO: 0 10E3/UL
NRBC BLD AUTO-RTO: 0 /100
O2/TOTAL GAS SETTING VFR VENT: 21 %
OXYHGB MFR BLDV: 36 % (ref 70–75)
PCO2 BLDV: 57 MM HG (ref 40–50)
PH BLDV: 7.26 [PH] (ref 7.32–7.43)
PLATELET # BLD AUTO: 238 10E3/UL (ref 150–450)
PO2 BLDV: 21 MM HG (ref 25–47)
POTASSIUM SERPL-SCNC: 5.7 MMOL/L (ref 3.4–5.3)
PROT SERPL-MCNC: 6.8 G/DL (ref 6.4–8.3)
RBC # BLD AUTO: 4.95 10E6/UL (ref 4.4–5.9)
SAO2 % BLDV: 36.7 % (ref 70–75)
SODIUM SERPL-SCNC: 138 MMOL/L (ref 135–145)
T4 FREE SERPL-MCNC: 1.45 NG/DL (ref 0.9–1.7)
TSH SERPL DL<=0.005 MIU/L-ACNC: 0.18 UIU/ML (ref 0.3–4.2)
WBC # BLD AUTO: 6.4 10E3/UL (ref 4–11)

## 2024-09-04 PROCEDURE — 82040 ASSAY OF SERUM ALBUMIN: CPT | Performed by: STUDENT IN AN ORGANIZED HEALTH CARE EDUCATION/TRAINING PROGRAM

## 2024-09-04 PROCEDURE — 93005 ELECTROCARDIOGRAM TRACING: CPT

## 2024-09-04 PROCEDURE — 83605 ASSAY OF LACTIC ACID: CPT | Performed by: STUDENT IN AN ORGANIZED HEALTH CARE EDUCATION/TRAINING PROGRAM

## 2024-09-04 PROCEDURE — 36415 COLL VENOUS BLD VENIPUNCTURE: CPT | Performed by: STUDENT IN AN ORGANIZED HEALTH CARE EDUCATION/TRAINING PROGRAM

## 2024-09-04 PROCEDURE — 96360 HYDRATION IV INFUSION INIT: CPT

## 2024-09-04 PROCEDURE — 258N000003 HC RX IP 258 OP 636: Performed by: STUDENT IN AN ORGANIZED HEALTH CARE EDUCATION/TRAINING PROGRAM

## 2024-09-04 PROCEDURE — 84439 ASSAY OF FREE THYROXINE: CPT | Performed by: STUDENT IN AN ORGANIZED HEALTH CARE EDUCATION/TRAINING PROGRAM

## 2024-09-04 PROCEDURE — 83735 ASSAY OF MAGNESIUM: CPT | Performed by: STUDENT IN AN ORGANIZED HEALTH CARE EDUCATION/TRAINING PROGRAM

## 2024-09-04 PROCEDURE — 93010 ELECTROCARDIOGRAM REPORT: CPT | Performed by: INTERNAL MEDICINE

## 2024-09-04 PROCEDURE — 99284 EMERGENCY DEPT VISIT MOD MDM: CPT | Performed by: STUDENT IN AN ORGANIZED HEALTH CARE EDUCATION/TRAINING PROGRAM

## 2024-09-04 PROCEDURE — 84443 ASSAY THYROID STIM HORMONE: CPT | Performed by: STUDENT IN AN ORGANIZED HEALTH CARE EDUCATION/TRAINING PROGRAM

## 2024-09-04 PROCEDURE — 85025 COMPLETE CBC W/AUTO DIFF WBC: CPT | Performed by: STUDENT IN AN ORGANIZED HEALTH CARE EDUCATION/TRAINING PROGRAM

## 2024-09-04 PROCEDURE — 82805 BLOOD GASES W/O2 SATURATION: CPT | Performed by: STUDENT IN AN ORGANIZED HEALTH CARE EDUCATION/TRAINING PROGRAM

## 2024-09-04 PROCEDURE — 99284 EMERGENCY DEPT VISIT MOD MDM: CPT | Mod: 25

## 2024-09-04 RX ORDER — CLONIDINE HYDROCHLORIDE 0.1 MG/1
0.1 TABLET ORAL ONCE
Status: DISCONTINUED | OUTPATIENT
Start: 2024-09-04 | End: 2024-09-04

## 2024-09-04 RX ADMIN — SODIUM CHLORIDE 1000 ML: 9 INJECTION, SOLUTION INTRAVENOUS at 21:12

## 2024-09-04 ASSESSMENT — ACTIVITIES OF DAILY LIVING (ADL)
ADLS_ACUITY_SCORE: 40
ADLS_ACUITY_SCORE: 42
ADLS_ACUITY_SCORE: 42

## 2024-09-04 ASSESSMENT — COLUMBIA-SUICIDE SEVERITY RATING SCALE - C-SSRS
1. IN THE PAST MONTH, HAVE YOU WISHED YOU WERE DEAD OR WISHED YOU COULD GO TO SLEEP AND NOT WAKE UP?: NO
6. HAVE YOU EVER DONE ANYTHING, STARTED TO DO ANYTHING, OR PREPARED TO DO ANYTHING TO END YOUR LIFE?: NO
2. HAVE YOU ACTUALLY HAD ANY THOUGHTS OF KILLING YOURSELF IN THE PAST MONTH?: NO

## 2024-09-04 NOTE — ED TRIAGE NOTES
"\"Having dizziness today and high blood pressure.  Blood pressure at home 216/109.  I also have a pressure on the left heel and home health care nurse has been taking care of it.  I also have a wound on the right lower leg and the home health care nurse treated it yesterday.\"        "

## 2024-09-05 LAB
ATRIAL RATE - MUSE: 86 BPM
DIASTOLIC BLOOD PRESSURE - MUSE: NORMAL MMHG
INTERPRETATION ECG - MUSE: NORMAL
P AXIS - MUSE: 76 DEGREES
PR INTERVAL - MUSE: 146 MS
QRS DURATION - MUSE: 92 MS
QT - MUSE: 354 MS
QTC - MUSE: 423 MS
R AXIS - MUSE: -15 DEGREES
SYSTOLIC BLOOD PRESSURE - MUSE: NORMAL MMHG
T AXIS - MUSE: 126 DEGREES
VENTRICULAR RATE- MUSE: 86 BPM

## 2024-09-05 NOTE — ED PROVIDER NOTES
[facility]  ED Provider Note    Chief Complaint   Patient presents with    Hypertension    Dizziness     History:  Homer Triana is a 64 year old male with history of type 2 diabetes, syncope, neuropathy, hypertension, DKA with a recent admission for cellulitis to this hospital in July ultimately discharged and home nonambulatory with home health care complaining of intermittent lightheadedness over the last week.  Sometimes he gets lightheaded when he sits up.  His doctor recently increased his hydrochlorothiazide and he has some edema on his legs.  He denies vertigo.  No headache neck pain chest pain shortness of breath abdominal pain nausea vomiting diarrhea urinary frequency urgency or dysuria    Review of Systems   Performed; see HPI for pertinent positives and negatives.     Medical history, surgical history, and social history was reviewed.  Nursing documentation, triage note, and vitals were reviewed.    Vitals:  BP: (!) 184/95  Pulse: 83  Temp: 98.9  F (37.2  C)  Resp: 18  SpO2: 99 %  Lying Orthostatic BP: 191/87  Lying Orthostatic Pulse: 86 bpm  Sitting Orthostatic BP: 168/82  Sitting Orthostatic Pulse: 86 bpm  Standing Orthostatic BP: 101/65  Standing Orthostatic Pulse: 87 bpm    Physical Exam:  Constitutional: Alert and conversant. NAD   HENT: NCAT   Eyes: Normal pupils   Neck: supple   CV: No pallor  Pulmonary/Chest: Non-labored respirations  Abdominal: non-distended   MSK: ZIMMERMAN.   Neuro: Alert and appropriate   Skin: Warm and dry. No diaphoresis.       Psych: Appropriate mood and affect       MDM:      ED Course as of 09/04/24 2159   Wed Sep 04, 2024   [id number] 64 male here complaining of dizziness.  His presentation is quite vague with the most pertinent historical finding that his symptoms seem to be triggered when he is going from lying down to sitting up or sitting up to standing.  Laboratory evaluation fairly unremarkable.  EKG without obvious classic signs of acute ischemia or  arrhythmia.  He has a small respiratory acidosis which is likely related to obesity hypoventilation or possibly some sort of underlying COPD.  He complains of high blood pressures, however, he is positive for orthostatics with his systolic blood pressure dropping almost 100 points between lying down and standing up which would certainly account for dizziness   2157 Liter fluids given.  Wounds on his leg checked.  No acute infection.  Liter of fluids given and patient feeling good.  Appropriate for further outpatient management, discharged stable condition all questions answered and return precautions given.       Procedures:  Procedures        Impression:  Final diagnoses:   Orthostatic dizziness            Osman Kramer MD  09/04/24 [id number]

## 2024-09-05 NOTE — DISCHARGE INSTRUCTIONS
Call your doctor in the morning and discuss your orthostatic dizziness.  Skip your hydrochlorothiazide tonight.  You can continue taking your other blood pressure medications.  Return to the emergency department for worsening symptoms or new concerning symptoms.

## 2024-09-22 ENCOUNTER — HEALTH MAINTENANCE LETTER (OUTPATIENT)
Age: 64
End: 2024-09-22

## 2025-01-12 ENCOUNTER — HEALTH MAINTENANCE LETTER (OUTPATIENT)
Age: 65
End: 2025-01-12

## 2025-04-26 ENCOUNTER — HEALTH MAINTENANCE LETTER (OUTPATIENT)
Age: 65
End: 2025-04-26

## 2025-08-09 ENCOUNTER — HEALTH MAINTENANCE LETTER (OUTPATIENT)
Age: 65
End: 2025-08-09

## (undated) RX ORDER — SODIUM CHLORIDE 9 MG/ML
INJECTION, SOLUTION INTRAVENOUS
Status: DISPENSED
Start: 2018-11-22